# Patient Record
Sex: MALE | Race: BLACK OR AFRICAN AMERICAN | Employment: FULL TIME | URBAN - METROPOLITAN AREA
[De-identification: names, ages, dates, MRNs, and addresses within clinical notes are randomized per-mention and may not be internally consistent; named-entity substitution may affect disease eponyms.]

---

## 2017-09-12 ENCOUNTER — OFFICE VISIT (OUTPATIENT)
Dept: ENDOCRINOLOGY | Age: 57
End: 2017-09-12

## 2017-09-12 VITALS
HEART RATE: 86 BPM | SYSTOLIC BLOOD PRESSURE: 152 MMHG | HEIGHT: 69 IN | DIASTOLIC BLOOD PRESSURE: 100 MMHG | BODY MASS INDEX: 33.53 KG/M2 | WEIGHT: 226.4 LBS

## 2017-09-12 DIAGNOSIS — E78.5 HYPERLIPIDEMIA WITH TARGET LDL LESS THAN 100: ICD-10-CM

## 2017-09-12 DIAGNOSIS — I10 ESSENTIAL HYPERTENSION, BENIGN: ICD-10-CM

## 2017-09-12 RX ORDER — TADALAFIL 5 MG/1
TABLET, FILM COATED ORAL
Refills: 11 | COMMUNITY
Start: 2017-07-19 | End: 2018-02-15

## 2017-09-12 RX ORDER — ATORVASTATIN CALCIUM 20 MG/1
TABLET, FILM COATED ORAL
Refills: 6 | COMMUNITY
Start: 2017-08-23 | End: 2019-08-03 | Stop reason: SDUPTHER

## 2017-09-12 RX ORDER — AMLODIPINE BESYLATE 10 MG/1
TABLET ORAL
Qty: 30 TAB | Refills: 11 | Status: SHIPPED | OUTPATIENT
Start: 2017-09-12

## 2017-09-12 RX ORDER — AZELASTINE HYDROCHLORIDE AND FLUTICASONE PROPIONATE 137; 50 UG/1; UG/1
SPRAY, METERED NASAL
Refills: 3 | COMMUNITY
Start: 2017-06-21 | End: 2018-02-15

## 2017-09-12 RX ORDER — DULAGLUTIDE 1.5 MG/.5ML
INJECTION, SOLUTION SUBCUTANEOUS
Refills: 1 | COMMUNITY
Start: 2017-09-05 | End: 2018-02-15 | Stop reason: DRUGHIGH

## 2017-09-12 RX ORDER — ALFUZOSIN HYDROCHLORIDE 10 MG/1
TABLET, EXTENDED RELEASE ORAL
Refills: 10 | COMMUNITY
Start: 2017-08-20 | End: 2017-09-12

## 2017-09-12 RX ORDER — POTASSIUM CHLORIDE 20 MEQ/1
40 TABLET, EXTENDED RELEASE ORAL DAILY
COMMUNITY
Start: 2017-09-12 | End: 2018-08-03

## 2017-09-12 RX ORDER — PEN NEEDLE, DIABETIC 31 GX5/16"
NEEDLE, DISPOSABLE MISCELLANEOUS
Refills: 11 | COMMUNITY
Start: 2017-08-25 | End: 2019-02-12 | Stop reason: SDUPTHER

## 2017-09-12 RX ORDER — PANTOPRAZOLE SODIUM 40 MG/1
TABLET, DELAYED RELEASE ORAL
Refills: 0 | COMMUNITY
Start: 2017-08-23 | End: 2018-08-03

## 2017-09-12 NOTE — PATIENT INSTRUCTIONS
1) Your Hemoglobin A1c is a 3 month marker of your diabetes control. Goal is less than 7% which means your average blood sugar is less than 150. Your Hemoglobin A1c is 6.5% which means your diabetes is under very good control. Continue to work on your diet and exercise and take all your medications as directed. 2) However, I'm concerned you are on too much insulin and this is leading to weight gain and making you hungry all the time and causing some low blood sugars. Cut back to 22 units of toujeo starting tomorrow morning. If your morning blood sugar before breakfast is staying between  after 4 days, then try decreasing by 2 units every 4 days to get to the lowest dose that keeps your morning sugars between . If you are able to get your toujeo dose all the way down to 6 units and you are still having sugars under 130 in the morning after 4 days then stop the insulin. 3) Your blood pressure has been up at your PCP's office and again today. Increase the amlodipine to 10 mg once daily for blood pressure. Take the prescription to the pharmacy when you need it. Take 2 of the 5 mg tabs until these run out and then take 1 of the 10 mg tabs. 4) Have Dr. Jm Fuentes repeat your labs this winter and bring me a copy to your next visit in February.

## 2017-09-12 NOTE — PROGRESS NOTES
Chief Complaint   Patient presents with    Diabetes     pcp and pharmacy confirmed    Other     patient has records with him     History of Present Illness: Josee Sullivan is a 62 y.o. male who is a new patient for evaluation of diabetes. Was diagnosed with diabetes around 2009. Lost over 100 lbs and had his A1c in the 5% range. Saw my partner, Dr. Basilio Old, once in 11/14 and his A1c was 7% at that time after gaining some of his weight back and since then has been managed PCP. Once his A1c started rising, initially was given metformin and recalls having some GI side effects but doesn't know if he tried ER or not. Did try glyxambi at one point and this was stopped due to not getting his A1c to goal.  Then was started on toujeo in 2/17 and is currently taking 25 units daily. Most recently in 6/17 was started on trulicity 1.5 mg weekly. Checks blood sugars 3-5 times per day and readings run . Can be under 80 several times a week. Hasn't cut back on the toujeo with the low sugars. Most recent Hgb A1c was 6.5% in 8/17. Does stay hungry all the time and eats to keep up with the insulin. A typical day is as follows:  - breakfast: jelly biscuit or croissant  - lunch: subway 6\" inch sub with a cookie or Omar Ebbs steak cheese, fries, or chick-tyrone-a nuggets or sandwich and fries  - dinner: baked chicken, green beans, small serving of mac n cheese, meat loaf, some potato, no pasta, some rice, may have 1-2 slices of bread 4-5 times a week, may have dessert a few times a week and get an oatmeal cake   - beverages: water, 1-2 regular sodas 5 days a week  - snacks: applesauce  Exercise consists of playing golf on occasion but no other dedicated exercise. No history of vascular disease. No history of retinopathy, neuropathy, (+) nephropathy followed by Dr. Iris Hyatt. Last eye exam was a few months ago. BP was elevated at PCP's office in 8/17 and no change was made to his regimen.     Past Medical History: Diagnosis Date    Arthritis     Diabetes ([facility] [place] [address] 2009    Gout     Hyperlipidemia LDL goal < 100 3/5/2013    Hypertension     MARIA DEL CARMEN on CPAP     Renal insufficiency 4/7/2011     Past Surgical History:   Procedure Laterality Date    HX HEENT      ingrown removed from back of scalp    HX SHOULDER ARTHROSCOPY Bilateral      Current Outpatient Prescriptions   Medication Sig    TRULICITY 1.5 DJ/3.0 mL sub-q pen INJECT 0.5ML (1.5MG) UNDER THE SKIN EVERY WEEK IN THE ABDOMEN, THIGH OR UPPER ARM. ROTATE SITES    potassium chloride (K-DUR, KLOR-CON) 20 mEq tablet Take 2 Tabs by mouth daily.  atorvastatin (LIPITOR) 20 mg tablet TAKE 1 TABLET BY ORAL ROUTE EVERY DAY    DYMISTA 137-50 mcg/spray spry USE 1 SQUIRT INTO EACH NOSTRIL EVERY 12 HOURS    pantoprazole (PROTONIX) 40 mg tablet TAKE 1 TABLET BY MOUTH EVERY DAY FOR RELFUX    BD INSULIN PEN NEEDLE UF SHORT 31 gauge x 5/16\" ndle USE FOR INJECTING INSULIN    CIALIS 5 mg tablet TAKE 1 TABLET BY MOUTH EVERY DAY    TOUJEO SOLOSTAR 300 unit/mL (1.5 mL) inpn INJECT 25 UNITS DAILY FOR 5DAYS, INCREASE BY 5UNITS/5DAYS UNTIL FASTING BG<120 MAX 40 UNITS    spironolactone (ALDACTONE) 25 mg tablet TAKE 1 TABLET BY MOUTH ONCE A DAY    lisinopril (PRINIVIL, ZESTRIL) 40 mg tablet TAKE ONE TABLET BY MOUTH ONE TIME DAILY    Cholecalciferol, Vitamin D3, 1,000 unit cap Take 1,000 Units by mouth daily.  allopurinol (ZYLOPRIM) 300 mg tablet TAKE ONE TABLET BY MOUTH ONE TIME DAILY     amlodipine (NORVASC) 5 mg tablet Take one tablet by mouth one time daily    hydrochlorothiazide (MICROZIDE) 12.5 mg capsule TAKE ONE CAPSULE BY MOUTH ONE TIME DAILY     carvedilol (COREG) 6.25 mg tablet Take 1 Tab by mouth two (2) times daily (with meals). No current facility-administered medications for this visit.       Allergies   Allergen Reactions    Ciprofloxacin Itching and Swelling    Flagyl [Metronidazole] Itching and Swelling     Family History   Problem Relation Age of Onset    Diabetes Father     Cancer Father      prostate    Hypertension Father     No Known Problems Mother     Diabetes Sister     No Known Problems Brother      Social History     Social History    Marital status:      Spouse name: N/A    Number of children: N/A    Years of education: N/A     Occupational History    Not on file. Social History Main Topics    Smoking status: Never Smoker    Smokeless tobacco: Never Used    Alcohol use No    Drug use: No    Sexual activity: Yes     Partners: Female     Birth control/ protection: Surgical     Other Topics Concern    Not on file     Social History Narrative    Lives in [place] [facility] with wife. Has a 24 yo son and 21 yo daughter. Works as a  at [employer]. Likes to play golf. Review of Systems:  - Constitutional Symptoms: no fevers, chills, (+) 12 lb weight gain over the past year  - Eyes: occ blurry vision, no double vision  - Cardiovascular: no chest pain or palpitations  - Respiratory: occ cough and food can feel like it gets hung, no shortness of breath  - Gastrointestinal: occ dysphagia, occ abdominal pain  - Musculoskeletal: (+) joint pains   - Integumentary: no rashes  - Neurological: rare numbness, tingling, no headaches  - Psychiatric: no depression or anxiety  - Endocrine: no heat or cold intolerance, no polyuria or polydipsia    Physical Examination:  Blood pressure (!) 152/100, pulse 86, height 5' 9\" (1.753 m), weight 226 lb 6.4 oz (102.7 kg).   Repeat manually 156/102 in left arm.  - General: pleasant, no distress, good eye contact  - HEENT: no exopthalmos, no periorbital edema, no scleral/conjunctival injection, EOMI, no lid lag or stare  - Neck: supple, no thyromegaly, masses, lymph nodes, or carotid bruits, no supraclavicular or dorsocervical fat pads  - Cardiovascular: regular, normal rate, normal S1 and S2, no murmurs/rubs/gallops,  - Respiratory: clear to auscultation bilaterally  - Gastrointestinal: soft, nontender, nondistended, no masses, no hepatosplenomegaly  - Musculoskeletal: no proximal muscle weakness in upper or lower extremities  - Integumentary: (+) acanthosis nigricans, no abdominal striae, no rashes, no edema, no foot ulcers  - Neurological: see foot exam  - Psychiatric: normal mood and affect         Diabetic foot exam performed by Melba Watson MD     Measurement  Response Nurse Comment Physician Comment   Monofilament  R - reduced sensation with micro filament  L - reduced sensation with micro filament     Pulse DP R - 2+ (normal)  L - 2+ (normal)     Pulse TP R -  slightly decreased    L -  slightly decreased       Structural deformity R - None  L - None     Skin Integrity / Deformity R - Mild - callus  L - Mild - callus        Reviewed by:                 Data Reviewed: 8/24/17  - Hgb A1c 6.5%  - lipids: total 182 ,  HDL 30, ,   - ALT 54, AST 39  - BUN/Cr 18/1.59  - microalbumin 55.1  -   - TSH 0.966  - vitamin D 86.1    Assessment/Plan:   1. Uncontrolled type 2 diabetes mellitus with diabetic nephropathy, with long-term current use of insulin ([facility] [address]): his most recent Hgb A1c was 6.5% in 8/17 which is excellent. However, I'm concerned about hypoglycemia and weight gain from the insulin so will decrease his toujeo as below. - decrease toujeo to 22 units daily  - cont trulicity 1.5 mg weekly  - check bs 3-5 times daily due to fluctuating sugars and hypoglycemia  - foot exam done 9/17  - [facility] summer 2017--will obtain report  - microalbumin 55.1 8/17  - check Hgb A1c, cmp, and microalbumin prior to next visit at PCP's office      2. Essential hypertension, benign: his BP was above goal < 140/90  - increase amlodipine to 10 mg daily  - cont coreg 6.25 mg bid  - cont lisinopril 40 mg daily  - cont spironolactone 25 mg daily      3.  Hyperlipidemia with target LDL less than 100:  in 8/17 and was just started on atorva 20 mg daily  - cont atorva 20 mg daily  - check lipids prior to next visit at PCP's office        Patient Instructions   1) Your Hemoglobin A1c is a 3 month marker of your diabetes control. Goal is less than 7% which means your average blood sugar is less than 150. Your Hemoglobin A1c is 6.5% which means your diabetes is under very good control. Continue to work on your diet and exercise and take all your medications as directed. 2) However, I'm concerned you are on too much insulin and this is leading to weight gain and making you hungry all the time and causing some low blood sugars. Cut back to 22 units of toujeo starting tomorrow morning. If your morning blood sugar before breakfast is staying between  after 4 days, then try decreasing by 2 units every 4 days to get to the lowest dose that keeps your morning sugars between . If you are able to get your toujeo dose all the way down to 6 units and you are still having sugars under 130 in the morning after 4 days then stop the insulin. 3) Your blood pressure has been up at your PCP's office and again today. Increase the amlodipine to 10 mg once daily for blood pressure. Take the prescription to the pharmacy when you need it. Take 2 of the 5 mg tabs until these run out and then take 1 of the 10 mg tabs. 4) Have Dr. Apoorva Leonardo repeat your labs this winter and bring me a copy to your next visit in February. Follow-up Disposition:  Return in about 5 months (around 2/12/2018).     Copy sent to:  Dr. Jackeline Cross

## 2017-09-12 NOTE — MR AVS SNAPSHOT
Visit Information Date & Time Provider Department Dept. Phone Encounter #  
 9/12/2017 10:50 AM Smooth Rodgers MD [facility] [phone] [id number] Follow-up Instructions Return in about 5 months (around 2/12/2018). Upcoming Health Maintenance Date Due Hepatitis C Screening 1960 Pneumococcal 19-64 Medium Risk (1 of 1 - PPSV23) 1/15/1979 DTaP/Tdap/Td series (1 - Tdap) 1/15/1981 FOBT Q 1 YEAR AGE 50-75 1/15/2010 INFLUENZA AGE 9 TO ADULT 8/1/2017 Allergies as of 9/12/2017  Review Complete On: 9/12/2017 By: Smooth Rodgers MD  
  
 Severity Noted Reaction Type Reactions Ciprofloxacin  05/24/2013    Itching, Swelling Flagyl [Metronidazole]  05/24/2013    Itching, Swelling Current Immunizations  Never Reviewed Name Date Influenza Vaccine 10/15/2014 Not reviewed this visit Vitals BP Pulse Height(growth percentile) Weight(growth percentile) BMI Smoking Status (!) 152/100 86 5' 9\" (1.753 m) 226 lb 6.4 oz (102.7 kg) 33.43 kg/m2 Never Smoker Vitals History BMI and BSA Data Body Mass Index Body Surface Area  
 33.43 kg/m 2 2.24 m 2 Preferred Pharmacy Pharmacy Name Phone [address] [facility] [phone] Your Updated Medication List  
  
   
This list is accurate as of: 9/12/17 12:25 PM.  Always use your most recent med list.  
  
  
  
  
 allopurinol 300 mg tablet Commonly known as:  ZYLOPRIM  
TAKE ONE TABLET BY MOUTH ONE TIME DAILY  
  
 amLODIPine 10 mg tablet Commonly known as:  [place] Take one tablet by mouth one time daily  
  
 atorvastatin 20 mg tablet Commonly known as:  LIPITOR  
TAKE 1 TABLET BY ORAL ROUTE EVERY DAY  
  
 BD INSULIN PEN NEEDLE UF SHORT 31 gauge x 5/16\" Ndle Generic drug:  Insulin Needles (Disposable) USE FOR INJECTING INSULIN  
  
 carvedilol 6.25 mg tablet Commonly known as:  Raynette Hy Take 1 Tab by mouth two (2) times daily (with meals). cholecalciferol 1,000 unit Cap Commonly known as:  VITAMIN D3 Take 1,000 Units by mouth daily. CIALIS 5 mg tablet Generic drug:  tadalafil TAKE 1 TABLET BY MOUTH EVERY DAY  
  
 DYMISTA 137-50 mcg/spray [place] Generic drug:  azelastine-fluticasone USE 1 SQUIRT INTO EACH NOSTRIL EVERY 12 HOURS  
  
 hydroCHLOROthiazide 12.5 mg capsule Commonly known as:  [place] TAKE ONE CAPSULE BY MOUTH ONE TIME DAILY  
  
 lisinopril 40 mg tablet Commonly known as:  PRINIVIL, ZESTRIL  
TAKE ONE TABLET BY MOUTH ONE TIME DAILY pantoprazole 40 mg tablet Commonly known as:  PROTONIX  
TAKE 1 TABLET BY MOUTH EVERY DAY FOR RELFUX potassium chloride 20 mEq tablet Commonly known as:  K-DUR, KLOR-CON Take 2 Tabs by mouth daily. spironolactone 25 mg tablet Commonly known as:  ALDACTONE  
TAKE 1 TABLET BY MOUTH ONCE A DAY  
  
 TOUJEO SOLOSTAR 300 unit/mL (1.5 mL) Inpn Generic drug:  insulin glargine INJECT 22 UNITS DAILY  
  
 TRULICITY 1.5 RV/5.6 mL sub-q pen Generic drug:  dulaglutide INJECT 0.5ML (1.5MG) UNDER THE SKIN EVERY WEEK IN THE ABDOMEN, THIGH OR UPPER ARM. ROTATE SITES Prescriptions Printed Refills  
 amLODIPine (NORVASC) 10 mg tablet 11 Sig: Take one tablet by mouth one time daily Class: Print Follow-up Instructions Return in about 5 months (around 2/12/2018). Patient Instructions 1) Your Hemoglobin A1c is a 3 month marker of your diabetes control. Goal is less than 7% which means your average blood sugar is less than 150. Your Hemoglobin A1c is 6.5% which means your diabetes is under very good control. Continue to work on your diet and exercise and take all your medications as directed.  
 
2) However, I'm concerned you are on too much insulin and this is leading to weight gain and making you hungry all the time and causing some low blood sugars. Cut back to 22 units of toujeo starting tomorrow morning. If your morning blood sugar before breakfast is staying between  after 4 days, then try decreasing by 2 units every 4 days to get to the lowest dose that keeps your morning sugars between . If you are able to get your toujeo dose all the way down to 6 units and you are still having sugars under 130 in the morning after 4 days then stop the insulin. 3) Your blood pressure has been up at your PCP's office and again today. Increase the amlodipine to 10 mg once daily for blood pressure. Take the prescription to the pharmacy when you need it. Take 2 of the 5 mg tabs until these run out and then take 1 of the 10 mg tabs. 4) Have Dr. Martha Ferreira repeat your labs this winter and bring me a copy to your next visit in February. Introducing [facility] & [facility] SERVICES! Dear Tab Arroyo: 
Thank you for requesting a [facility] account. Our records indicate that you already have an active [facility]ina account. You can access your account anytime at [email or web address] Did you know that you can access your hospital and ER discharge instructions at any time in Lalina? You can also review all of your test results from your hospital stay or ER visit. Additional Information If you have questions, please visit the Frequently Asked Questions section of the [facility]ina website at [email or web address] Remember, [facility]ina is NOT to be used for urgent needs. For medical emergencies, dial 911. Now available from your iPhone and Android! Please provide this summary of care documentation to your next provider. Your primary care clinician is listed as [address]. If you have any questions after today's visit, please call [phone].

## 2017-12-03 ENCOUNTER — HOSPITAL ENCOUNTER (EMERGENCY)
Age: 57
Discharge: HOME | End: 2017-12-03
Attending: EMERGENCY MEDICINE
Payer: COMMERCIAL

## 2017-12-03 VITALS
OXYGEN SATURATION: 99 % | HEART RATE: 77 BPM | RESPIRATION RATE: 17 BRPM | SYSTOLIC BLOOD PRESSURE: 160 MMHG | DIASTOLIC BLOOD PRESSURE: 78 MMHG | TEMPERATURE: 97.5 F

## 2017-12-03 DIAGNOSIS — B02.9 HERPES ZOSTER WITHOUT COMPLICATION: Primary | ICD-10-CM

## 2017-12-03 PROCEDURE — 99283 EMERGENCY DEPT VISIT LOW MDM: CPT

## 2017-12-03 PROCEDURE — 74011250637 HC RX REV CODE- 250/637: Performed by: EMERGENCY MEDICINE

## 2017-12-03 RX ORDER — VALACYCLOVIR HYDROCHLORIDE 500 MG/1
500 TABLET, FILM COATED ORAL
Status: COMPLETED | OUTPATIENT
Start: 2017-12-03 | End: 2017-12-03

## 2017-12-03 RX ORDER — OXYCODONE HYDROCHLORIDE 10 MG/1
10 TABLET ORAL
Qty: 30 TAB | Refills: 0 | Status: SHIPPED | OUTPATIENT
Start: 2017-12-03 | End: 2018-02-15

## 2017-12-03 RX ORDER — VALACYCLOVIR HYDROCHLORIDE 1 G/1
1000 TABLET, FILM COATED ORAL 3 TIMES DAILY
Qty: 21 TAB | Refills: 0 | Status: SHIPPED | OUTPATIENT
Start: 2017-12-03 | End: 2017-12-03

## 2017-12-03 RX ORDER — VALACYCLOVIR HYDROCHLORIDE 1 G/1
1000 TABLET, FILM COATED ORAL 3 TIMES DAILY
Qty: 21 TAB | Refills: 0 | Status: SHIPPED | OUTPATIENT
Start: 2017-12-03 | End: 2017-12-10

## 2017-12-03 RX ORDER — LIDOCAINE 50 MG/G
1 PATCH TOPICAL EVERY 24 HOURS
Status: DISCONTINUED | OUTPATIENT
Start: 2017-12-03 | End: 2017-12-03 | Stop reason: HOSPADM

## 2017-12-03 RX ADMIN — VALACYCLOVIR HYDROCHLORIDE 500 MG: 500 TABLET, FILM COATED ORAL at 05:50

## 2017-12-03 NOTE — DISCHARGE INSTRUCTIONS
Shingles: Care Instructions  Your Care Instructions    Shingles (herpes zoster) causes pain and a blistered rash. The rash can appear anywhere on the body but will be on only one side of the body, the left or right. It will be in a band, a strip, or a small area. The pain can be very severe. Shingles can also cause tingling or itching in the area of the rash. The blisters scab over after a few days and heal in 2 to 4 weeks. Medicines can help you feel better and may help prevent more serious problems caused by shingles. Shingles is caused by the same virus that causes chickenpox. When you have chickenpox, the virus gets into your nerve roots and stays there (becomes dormant) long after you get over the chickenpox. If the virus becomes active again, it can cause shingles. Follow-up care is a key part of your treatment and safety. Be sure to make and go to all appointments, and call your doctor if you are having problems. It's also a good idea to know your test results and keep a list of the medicines you take. How can you care for yourself at home? · Be safe with medicines. Take your medicines exactly as prescribed. Call your doctor if you think you are having a problem with your medicine. Antiviral medicine helps you get better faster. · Try not to scratch or pick at the blisters. They will crust over and fall off on their own if you leave them alone. · Put cool, wet cloths on the area to relieve pain and itching. You can also use calamine lotion. Try not to use so much lotion that it cakes and is hard to get off. · Put cornstarch or baking soda on the sores to help dry them out so they heal faster. · Do not use thick ointment, such as petroleum jelly, on the sores. This will keep them from drying and healing. · To help remove loose crusts, soak them in tap water. This can help decrease oozing, and dry and soothe the skin.   · Take an over-the-counter pain medicine, such as acetaminophen (Tylenol), ibuprofen (Advil, Motrin), or naproxen (Aleve). Read and follow all instructions on the label. · Avoid close contact with people until the blisters have healed. It is very important for you to avoid contact with anyone who has never had chickenpox or the chickenpox vaccine. Pregnant women, young babies, and anyone else who has a hard time fighting infection (such as someone with HIV, diabetes, or cancer) is especially at risk. When should you call for help? Call your doctor now or seek immediate medical care if:  ? · You have a new or higher fever. ? · You have a severe headache and a stiff neck. ? · You lose the ability to think clearly. ? · The rash spreads to your forehead, nose, eyes, or eyelids. ? · You have eye pain, or your vision gets worse. ? · You have new pain in your face, or you cannot move the muscles in your face. ? · Blisters spread to new parts of your body. ? Watch closely for changes in your health, and be sure to contact your doctor if:  ? · The rash has not healed after 2 to 4 weeks. ? · You still have pain after the rash has healed. Where can you learn more? Go to http://bob-stefan.info/. Raymond Chester in the search box to learn more about \"Shingles: Care Instructions. \"  Current as of: March 3, 2017  Content Version: 11.4  © 1197-1923 [employer]. Care instructions adapted under license by [employer] (which disclaims liability or warranty for this information). If you have questions about a medical condition or this instruction, always ask your healthcare professional. [place] [address] any warranty or liability for your use of this information.       SALPONAS- IS A LIDOCAINE PATCH AVAILABLE OVER THE COUNTER

## 2017-12-03 NOTE — ED PROVIDER NOTES
EMERGENCY DEPARTMENT HISTORY AND PHYSICAL EXAM      Date: 12/3/2017  Patient Name: Jian Barajas III    History of Presenting Illness     Chief Complaint   Patient presents with    Allergic Reaction     patient presents with itching to left portion of neck after starting Levaquin last Monday       History Provided By: Patient    HPI: Dejan Costello, 62 y.o. male with PMHx significant for HTN, DM, HLD, renal insufficiency, gout, presents ambulatory to the ED with cc of itching and painful bumps to the posterior left neck x 5 days. Pt was seen by urology 6 days ago and started on Levaquin. Pt denies any difficulties with his hearing. Pt lives with his wife. He is scheduled to return to work in 2 days. PCP: Adis Penaloza MD    There are no other complaints, changes, or physical findings at this time. Current Facility-Administered Medications   Medication Dose Route Frequency Provider Last Rate Last Dose    lidocaine (LIDODERM) 5 % patch 1 Patch  1 Patch TransDERmal Q24H Giovanny Muniz, DO   1 Patch at 12/03/17 0550     Current Outpatient Prescriptions   Medication Sig Dispense Refill    oxyCODONE IR (ROXICODONE) 10 mg tab immediate release tablet Take 1 Tab by mouth every four (4) hours as needed. Max Daily Amount: 60 mg. 30 Tab 0    valACYclovir (VALTREX) 1 gram tablet Take 1 Tab by mouth three (3) times daily for 7 days. 21 Tab 0    TRULICITY 1.5 OM/4.2 mL sub-q pen INJECT 0.5ML (1.5MG) UNDER THE SKIN EVERY WEEK IN THE ABDOMEN, THIGH OR UPPER ARM. ROTATE SITES  1    potassium chloride (K-DUR, KLOR-CON) 20 mEq tablet Take 2 Tabs by mouth daily.       atorvastatin (LIPITOR) 20 mg tablet TAKE 1 TABLET BY ORAL ROUTE EVERY DAY  6    DYMISTA 137-50 mcg/spray spry USE 1 SQUIRT INTO EACH NOSTRIL EVERY 12 HOURS  3    pantoprazole (PROTONIX) 40 mg tablet TAKE 1 TABLET BY MOUTH EVERY DAY FOR RELFUX  0    BD INSULIN PEN NEEDLE UF SHORT 31 gauge x 5/16\" ndle USE FOR INJECTING INSULIN  11    CIALIS 5 mg tablet TAKE 1 TABLET BY MOUTH EVERY DAY  11    amLODIPine (NORVASC) 10 mg tablet Take one tablet by mouth one time daily 30 Tab 11    TOUJEO SOLOSTAR 300 unit/mL (1.5 mL) inpn INJECT 22 UNITS DAILY  2    spironolactone (ALDACTONE) 25 mg tablet TAKE 1 TABLET BY MOUTH ONCE A DAY  1    lisinopril (PRINIVIL, ZESTRIL) 40 mg tablet TAKE ONE TABLET BY MOUTH ONE TIME DAILY 30 Tab 0    Cholecalciferol, Vitamin D3, 1,000 unit cap Take 1,000 Units by mouth daily.  allopurinol (ZYLOPRIM) 300 mg tablet TAKE ONE TABLET BY MOUTH ONE TIME DAILY  30 tablet 2    hydrochlorothiazide (MICROZIDE) 12.5 mg capsule TAKE ONE CAPSULE BY MOUTH ONE TIME DAILY  30 capsule 1    carvedilol (COREG) 6.25 mg tablet Take 1 Tab by mouth two (2) times daily (with meals). 180 Tab 3       Past History     Past Medical History:  Past Medical History:   Diagnosis Date    Arthritis     Diabetes ([facility] [address] 2009    Gout     Hyperlipidemia LDL goal < 100 3/5/2013    Hypertension     MARIA DEL CARMEN on CPAP     Renal insufficiency 4/7/2011       Past Surgical History:  Past Surgical History:   Procedure Laterality Date    HX HEENT      ingrown removed from back of scalp    HX SHOULDER ARTHROSCOPY Bilateral        Family History:  Family History   Problem Relation Age of Onset    Diabetes Father     Cancer Father      prostate    Hypertension Father     No Known Problems Mother     Diabetes Sister     No Known Problems Brother        Social History:  Social History   Substance Use Topics    Smoking status: Never Smoker    Smokeless tobacco: Never Used    Alcohol use No       Allergies: Allergies   Allergen Reactions    Ciprofloxacin Itching and Swelling    Flagyl [Metronidazole] Itching and Swelling         Review of Systems   Review of Systems   Constitutional: Negative. Negative for appetite change, chills, fatigue and fever. HENT: Negative. Negative for congestion, rhinorrhea, sinus pressure and sore throat. Eyes: Negative.     Respiratory: Negative. Negative for cough, choking, chest tightness, shortness of breath and wheezing. Cardiovascular: Negative. Negative for chest pain, palpitations and leg swelling. Gastrointestinal: Negative for abdominal pain, constipation, diarrhea, nausea and vomiting. Endocrine: Negative. Genitourinary: Negative. Negative for difficulty urinating, dysuria, flank pain and urgency. Musculoskeletal: Negative. Skin: Negative.         + vesicles, itching, swelling   Neurological: Negative. Negative for dizziness, speech difficulty, weakness, light-headedness, numbness and headaches. Psychiatric/Behavioral: Negative. All other systems reviewed and are negative. Physical Exam   Physical Exam   Constitutional: He is oriented to person, place, and time. He appears well-developed and well-nourished. No distress. HENT:   Head: Normocephalic and atraumatic. Mouth/Throat: Oropharynx is clear and moist. No oropharyngeal exudate. Eyes: Conjunctivae and EOM are normal. Pupils are equal, round, and reactive to light. Neck: Normal range of motion. Neck supple. No JVD present. No tracheal deviation present. Cardiovascular: Normal rate, regular rhythm, normal heart sounds and intact distal pulses. No murmur heard. Pulmonary/Chest: Effort normal and breath sounds normal. No stridor. No respiratory distress. He has no wheezes. He has no rales. He exhibits no tenderness. Genitourinary:   Genitourinary Comments: Foy cath in place     Musculoskeletal: Normal range of motion. He exhibits no edema or tenderness. Neurological: He is alert and oriented to person, place, and time. No cranial nerve deficit. No gross motor or sensory deficits    Skin: Skin is warm and dry. Rash (Vesicle rash behin left ear coming down the left side of the neck, no drainage ) noted. He is not diaphoretic. Psychiatric: He has a normal mood and affect.  His behavior is normal.   Nursing note and vitals reviewed. Diagnostic Study Results     Labs -   No results found for this or any previous visit (from the past 12 hour(s)). Medical Decision Making   I am the first provider for this patient. I reviewed the vital signs, available nursing notes, past medical history, past surgical history, family history and social history. Vital Signs-Reviewed the patient's vital signs. Patient Vitals for the past 12 hrs:   Temp Pulse Resp BP SpO2   12/03/17 0514 97.5 °F (36.4 °C) 88 16 (!) 193/109 98 %       Pulse Oximetry Analysis - 98% on RA    Cardiac Monitor:   Rate: 88 bpm  Rhythm: Normal Sinus Rhythm        Records Reviewed: Nursing Notes    Provider Notes (Medical Decision Making):   DDx: shingles    ED Course:   Initial assessment performed. The patients presenting problems have been discussed, and they are in agreement with the care plan formulated and outlined with them. I have encouraged them to ask questions as they arise throughout their visit. 6:54 AM  Pt improved on re-eval. Ready to go home. Disposition:  DISCHARGE NOTE:  6:42 AM  Pt has been reexamined. Pt has no new complaints, changes, or physical findings. Care plan outlined and precautions discussed. All available results reviewed with pt. All medications reviewed with pt. All of pts questions and concerns addressed. Pt agrees to f/u as instructed and agrees to return to ED upon further deterioration. Pt is ready to go home. Written by Shayy Mckee ED Scribe as dictated by Jose Rodriguez DO    PLAN:  1. Current Discharge Medication List      START taking these medications    Details   oxyCODONE IR (ROXICODONE) 10 mg tab immediate release tablet Take 1 Tab by mouth every four (4) hours as needed. Max Daily Amount: 60 mg.  Qty: 30 Tab, Refills: 0      valACYclovir (VALTREX) 1 gram tablet Take 1 Tab by mouth three (3) times daily for 7 days. Qty: 21 Tab, Refills: 0           2.    Follow-up Information     Follow up With Details Comments Contact Info    Ariadne Johnson, [address] [facility]  [phone]          Return to ED if worse     Diagnosis     Clinical Impression:   1. Herpes zoster without complication        Attestations:    Attestation: This note is prepared by Magdy Reddy, acting as Scribe for DO Elma Melendrez DO: The scribe's documentation has been prepared under my direction and personally reviewed by me in its entirety. I confirm that the note above accurately reflects all work, treatment, procedures, and medical decision making performed by me.

## 2018-01-18 ENCOUNTER — HOSPITAL ENCOUNTER (OUTPATIENT)
Dept: NUCLEAR MEDICINE | Age: 58
Discharge: HOME | End: 2018-01-18
Attending: SPECIALIST
Payer: COMMERCIAL

## 2018-01-18 DIAGNOSIS — R10.13 EPIGASTRIC ABDOMINAL PAIN: ICD-10-CM

## 2018-01-18 DIAGNOSIS — R68.81 EARLY SATIETY: ICD-10-CM

## 2018-01-18 DIAGNOSIS — K42.9 UMBILICAL HERNIA: ICD-10-CM

## 2018-01-18 DIAGNOSIS — R10.819 ABDOMINAL TENDERNESS: ICD-10-CM

## 2018-01-18 PROCEDURE — 78264 GASTRIC EMPTYING IMG STUDY: CPT

## 2018-01-29 ENCOUNTER — TELEPHONE (OUTPATIENT)
Dept: ENDOCRINOLOGY | Age: 58
End: 2018-01-29

## 2018-01-29 NOTE — TELEPHONE ENCOUNTER
Patient is requesting a call back. He stated that he thinks he maybe having a reaction to his trulicity. He stated that he is having stomach issues and a lot of bloating. He can be reached at [phone].

## 2018-01-30 NOTE — TELEPHONE ENCOUNTER
It's possible the trulicity could be the cause and I agree with staying off this the next 2 weeks until he comes to see me on 2/15/18. Then we can decided if he should go back on this at a lower dose or not. He should monitor his sugars closely off the trulicity over the next 2 weeks and bring a list of readings to his visit.

## 2018-01-30 NOTE — TELEPHONE ENCOUNTER
I spoke with patient and he stated that he has been experiencing GI upset for quite some time but it has been severe for the past few months. He stated he has seen a GI doctor and he has even had the Upper GI study. He would like to know if the Trulicity could be causing the stomach upset. He stated that he started the Trulicity in June of 2752. He stated that the sever stomach upset started in October of 2017. He is trying to do the process of elimination to see if the trulicity could add to his stomach condition. Patient stated that he did not take his weekly injection on yesterday and his stomach does feel a little better.

## 2018-02-15 ENCOUNTER — TELEPHONE (OUTPATIENT)
Dept: ENDOCRINOLOGY | Age: 58
End: 2018-02-15

## 2018-02-15 ENCOUNTER — OFFICE VISIT (OUTPATIENT)
Dept: ENDOCRINOLOGY | Age: 58
End: 2018-02-15

## 2018-02-15 VITALS
HEIGHT: 69 IN | DIASTOLIC BLOOD PRESSURE: 80 MMHG | WEIGHT: 217.2 LBS | HEART RATE: 73 BPM | SYSTOLIC BLOOD PRESSURE: 140 MMHG | BODY MASS INDEX: 32.17 KG/M2

## 2018-02-15 DIAGNOSIS — I10 ESSENTIAL HYPERTENSION, BENIGN: ICD-10-CM

## 2018-02-15 DIAGNOSIS — E78.5 HYPERLIPIDEMIA WITH TARGET LDL LESS THAN 100: ICD-10-CM

## 2018-02-15 RX ORDER — SPIRONOLACTONE 50 MG/1
TABLET, FILM COATED ORAL
Qty: 30 TAB | Refills: 11 | Status: SHIPPED | OUTPATIENT
Start: 2018-02-15 | End: 2018-09-28 | Stop reason: SDUPTHER

## 2018-02-15 NOTE — PROGRESS NOTES
Chief Complaint   Patient presents with    Diabetes     pcp and pharmacy confirmed    Other     eye exam is due     History of Present Illness: Mejia Connelly is a 62 y.o. male here for follow up of diabetes. Weight down 9 lbs since last visit in 9/17. Had been taking trulicity every week until he called us on 1/29/18 and was having more nausea and abd pain and bloating. Has not taken any trulicity the past 3 weeks but still is having some GI symptoms despite not being on this medication and he thinks some of the symptoms could be the food he is eating and due eating dinner late at night. Currently has decreased the toujeo to 18 units and has been on this dose for several months. While on the trulicity was having fasting sugars in the 90-120s. Off the trulicity, it has been closer to 180-200 in the morning but has seen a reading over 300 after eating. Over the past 3 weeks, has really tried to work on his diet to cut back on portions and his types of carbs. Has been on the higher dose of amlodipine since last visit. No myalgias on the atorvastatin. Current Outpatient Prescriptions   Medication Sig    potassium chloride (K-DUR, KLOR-CON) 20 mEq tablet Take 2 Tabs by mouth daily.  atorvastatin (LIPITOR) 20 mg tablet TAKE 1 TABLET BY ORAL ROUTE EVERY DAY    pantoprazole (PROTONIX) 40 mg tablet TAKE 1 TABLET BY MOUTH EVERY DAY FOR RELFUX    BD INSULIN PEN NEEDLE UF SHORT 31 gauge x 5/16\" ndle USE FOR INJECTING INSULIN    amLODIPine (NORVASC) 10 mg tablet Take one tablet by mouth one time daily    TOUJEO SOLOSTAR 300 unit/mL (1.5 mL) inpn 18 units daily    spironolactone (ALDACTONE) 25 mg tablet TAKE 1 TABLET BY MOUTH ONCE A DAY    lisinopril (PRINIVIL, ZESTRIL) 40 mg tablet TAKE ONE TABLET BY MOUTH ONE TIME DAILY    Cholecalciferol, Vitamin D3, 1,000 unit cap Take 1,000 Units by mouth daily.     allopurinol (ZYLOPRIM) 300 mg tablet TAKE ONE TABLET BY MOUTH ONE TIME DAILY     hydrochlorothiazide (MICROZIDE) 12.5 mg capsule TAKE ONE CAPSULE BY MOUTH ONE TIME DAILY     carvedilol (COREG) 6.25 mg tablet Take 1 Tab by mouth two (2) times daily (with meals).  TRULICITY 1.5 WK/9.0 mL sub-q pen INJECT 0.5ML (1.5MG) UNDER THE SKIN EVERY WEEK IN THE ABDOMEN, THIGH OR UPPER ARM. ROTATE SITES     No current facility-administered medications for this visit. Allergies   Allergen Reactions    Ciprofloxacin Itching and Swelling    Flagyl [Metronidazole] Itching and Swelling     Review of Systems:  - Eyes: no blurry vision or double vision  - Cardiovascular: no chest pain  - Respiratory: no shortness of breath  - Musculoskeletal: no myalgias  - Neurological: no numbness/tingling in extremities    Physical Examination:  Blood pressure 140/80, pulse 73, height 5' 9\" (1.753 m), weight 217 lb 3.2 oz (98.5 kg). - General: pleasant, no distress, good eye contact   - Neck: no carotid bruits  - Cardiovascular: regular, normal rate, nl s1 and s2, no m/r/g,   - Respiratory: clear bilaterally  - Integumentary: no edema,   - Psychiatric: normal mood and affect    Data Reviewed:   - none new for review    Assessment/Plan:     1. Uncontrolled type 2 diabetes mellitus with diabetic nephropathy, with long-term current use of insulin ([facility] [address]): his most recent Hgb A1c was 6.5% in 8/17. I expect this one to be higher since stopping the trulicity 3 weeks ago due to GI side effects. I will try restarting at a lower dose to see if he can tolerate this better. - cont toujeo 18 units daily  - restart trulicity at 5.13 mg weekly  - check bs 3-5 times daily due to fluctuating sugars and hypoglycemia  - foot exam done 9/17  - optho UTD 11/16--due now  - microalbumin 55.1 8/17  - check A1c and cmp today  - check Hgb A1c, cmp, and microalbumin prior to next visit       2. Essential hypertension, benign: his BP was above goal < 140/90 so will stop hctz and increase the spironolactone.   - cont amlodipine 10 mg daily  - cont coreg 6.25 mg bid  - cont lisinopril 40 mg daily  - increase spironolactone to 50 mg daily  - stop hctz 12.5 mg daily      3. Hyperlipidemia with target LDL less than 100:  in 8/17 and was just started on atorva 20 mg daily  - cont atorva 20 mg daily  - check lipids today and prior to next visit         Patient Instructions   1) Please call [phone] to reset your [employer] password and I'll e-mail your lab results. 2) We will restart the trulicity at the lower dose 0.75 mg weekly to help with any GI side effects. 3) Keep taking toujeo 18 units daily in the morning. If your morning sugars are staying under 110, then cut back by 2 units every week to get to the lowest dose that keeps you between  in the morning. 4) We will increase the spironolactone to 50 mg once daily. Take 2 of the 25 mg tabs until these run out and then take 1 of the 50 mg tabs. Stop the hctz 12.5 mg tabs. 5) Start monitoring blood pressure about 2-3 times per week at alternating times either in the morning or evening after resting for 5 minutes and sitting upright in a chair with your arm at heart level. Please let me know if you are having readings over 140 on the top number or 90 on the bottom number after 2 weeks on the new dose of spironolactone. 6) If you want to change to mail order in the future for any of your meds, let me know. 7) Based on the lab results, I'll determine what I need for next time and I'll put an order directly into the [facility] system and you can go sometime the week before the visit. I'll send you a reminder e-mail to do this. Follow-up Disposition:  Return in about 5 months (around 7/15/2018).     Copy sent to:  Dr. Nicolle Pollard    Lab follow up: 2/17/18    Sent him the following message in a letter:    Resulted Orders   HEMOGLOBIN A1C WITH EAG   Result Value Ref Range    Hemoglobin A1c 10.4 (H) 4.8 - 5.6 %      Comment:               Pre-diabetes: 5.7 - 6.4           Diabetes: >6.4           Glycemic control for adults with diabetes: <7.0      Estimated average glucose 252 mg/dL    Narrative    Performed at:  [address]  [id number]  : Saul Gross MD, Phone:  [phone]   METABOLIC PANEL, COMPREHENSIVE   Result Value Ref Range    Glucose 339 (H) 65 - 99 mg/dL    BUN 21 6 - 24 mg/dL    Creatinine 1.71 (H) 0.76 - 1.27 mg/dL    GFR est non-AA 43 (L) >59 mL/min/1.73    GFR est AA 50 (L) >59 mL/min/1.73    BUN/Creatinine ratio 12 9 - 20    Sodium 139 134 - 144 mmol/L    Potassium 4.5 3.5 - 5.2 mmol/L    Chloride 94 (L) 96 - 106 mmol/L    CO2 25 18 - 29 mmol/L    Calcium 9.8 8.7 - 10.2 mg/dL    Protein, total 7.7 6.0 - 8.5 g/dL    Albumin 4.4 3.5 - 5.5 g/dL    GLOBULIN, TOTAL 3.3 1.5 - 4.5 g/dL    A-G Ratio 1.3 1.2 - 2.2    Bilirubin, total 0.6 0.0 - 1.2 mg/dL    Alk. phosphatase 106 39 - 117 IU/L    AST (SGOT) 61 (H) 0 - 40 IU/L    ALT (SGPT) 85 (H) 0 - 44 IU/L    Narrative    Performed at:  [address]  [id number]  : Saul Gross MD, Phone:  [phone]   LIPID PANEL   Result Value Ref Range    Cholesterol, total 105 100 - 199 mg/dL    Triglyceride 194 (H) 0 - 149 mg/dL    HDL Cholesterol 28 (L) >39 mg/dL    VLDL, calculated 39 5 - 40 mg/dL    LDL, calculated 38 0 - 99 mg/dL    Narrative    Performed at:  [address]  [id number]  : Saul Gross MD, Phone:  [phone]     I wanted to update you on your recent lab results:    Hemoglobin A1c is a 3 month marker of your diabetes control. Goal is less than 7% which means your average blood sugar is less than 150. Your Hemoglobin A1c is 10.4% which means your diabetes is under worse control than 6.5% at your last check. Hopefully you'll be able to tolerate the lower dose of trulicity and get your blood sugars back down.   Continue to work on your diet and exercise and take all your medications as directed.  -------------------------------------------------------------------------------------------------------------------  Total Cholesterol is the total number of cholesterol particles in your blood. Goal is less than 200. Your value is at goal.    Triglycerides are the short term fats in your blood. Goal is less than 150. Your value is above goal.    HDL is the good cholesterol in your blood. Goal is more than 40. Your value is below goal but I'm not concerned given how low your total cholesterol is. LDL is the bad cholesterol in your blood. Goal is less than 100. Your value is at goal with starting atorvastatin. Continue to follow a low cholesterol diet. Try to limit the amount of fried foods, fatty foods, butter, gravy, red meat, ice cream, cheese, and eggs in your diet, which are all high in cholesterol. Take all of your medications (atorvastatin) as directed.  -------------------------------------------------------------------------------------------------------------------  BUN and creatinine are markers of kidney function. Your creatinine is abnormal and up from 1.59 to 1.71 likely due to worsening diabetes control.  -------------------------------------------------------------------------------------------------------------------  ALT and AST are markers of liver function. Your values are abnormal and up from 54 and 39 at last check. It's unclear if this rise could be from starting the atorvastatin for cholesterol so we'll follow this for now and if your level is going any higher, we will plan on decreasing your dose of atorvastatin.

## 2018-02-15 NOTE — MR AVS SNAPSHOT
[address] [facility] [address] [address] [phone] Patient: Haresh Jones 
MRN:  [facility]:[id number] Visit Information Date & Time Provider Department Dept. Phone Encounter #  
 2/15/2018  9:30 AM Aditi Wahl, [address] [place] [address] Diabetes and Endocrinology [phone] Follow-up Instructions Return in about 5 months (around 7/15/2018). Upcoming Health Maintenance Date Due Hepatitis C Screening 1960 Pneumococcal 19-64 Medium Risk (1 of 1 - PPSV23) 1/15/1979 DTaP/Tdap/Td series (1 - Tdap) 1/15/1981 FOBT Q 1 YEAR AGE 50-75 1/15/2010 Influenza Age 5 to Adult 8/1/2017 EYE EXAM RETINAL OR DILATED Q1 11/4/2017 HEMOGLOBIN A1C Q6M 2/23/2018 MICROALBUMIN Q1 8/23/2018 LIPID PANEL Q1 8/23/2018 FOOT EXAM Q1 9/12/2018 Allergies as of 2/15/2018  Review Complete On: 2/15/2018 By: Aditi Wahl MD  
  
 Severity Noted Reaction Type Reactions Ciprofloxacin  05/24/2013    Itching, Swelling Flagyl [Metronidazole]  05/24/2013    Itching, Swelling Current Immunizations  Never Reviewed Name Date Influenza Vaccine 10/15/2014 Not reviewed this visit Vitals BP Pulse Height(growth percentile) Weight(growth percentile) BMI Smoking Status 140/80 73 5' 9\" (1.753 m) 217 lb 3.2 oz (98.5 kg) 32.07 kg/m2 Never Smoker Vitals History BMI and BSA Data Body Mass Index Body Surface Area 32.07 kg/m 2 2.19 m 2 Preferred Pharmacy Pharmacy Name Phone [facility] [address] [place] [facility] Darío Dwyer [phone] Your Updated Medication List  
  
   
This list is accurate as of: 2/15/18 10:54 AM.  Always use your most recent med list.  
  
  
  
  
 allopurinol 300 mg tablet Commonly known as:  ZYLOPRIM  
TAKE ONE TABLET BY MOUTH ONE TIME DAILY  
  
 amLODIPine 10 mg tablet Commonly known as:  Lon Gruber Take one tablet by mouth one time daily  
  
 atorvastatin 20 mg tablet Commonly known as:  LIPITOR  
TAKE 1 TABLET BY ORAL ROUTE EVERY DAY  
  
 BD INSULIN PEN NEEDLE UF SHORT 31 gauge x \" Ndle Generic drug:  Insulin Needles (Disposable) USE FOR INJECTING INSULIN  
  
 carvedilol 6.25 mg tablet Commonly known as:  Kailee Thanh Take 1 Tab by mouth two (2) times daily (with meals). cholecalciferol 1,000 unit Cap Commonly known as:  VITAMIN D3 Take 1,000 Units by mouth daily. dulaglutide 0.75 mg/0.5 mL sub-q pen Commonly known as:  TRULICITY  
0.5 mL by SubCUTAneous route every seven (7) days. NEW LOWER DOSE REPLACES THE 1.5 MG DOSE  
  
 hydroCHLOROthiazide 12.5 mg capsule Commonly known as:  Catherne Any TAKE ONE CAPSULE BY MOUTH ONE TIME DAILY  
  
 lisinopril 40 mg tablet Commonly known as:  PRINIVIL, ZESTRIL  
TAKE ONE TABLET BY MOUTH ONE TIME DAILY pantoprazole 40 mg tablet Commonly known as:  PROTONIX  
TAKE 1 TABLET BY MOUTH EVERY DAY FOR RELFUX potassium chloride 20 mEq tablet Commonly known as:  K-DUR, KLOR-CON Take 2 Tabs by mouth daily. spironolactone 50 mg tablet Commonly known as:  ALDACTONE  
TAKE 1 TABLET BY MOUTH ONCE A DAY  
  
 TOUJEO SOLOSTAR 300 unit/mL (1.5 mL) Inpn Generic drug:  insulin glargine 18 units daily Prescriptions Sent to Pharmacy Refills  
 dulaglutide (TRULICITY) 0.62 RN/5.9 mL sub-q pen 11 Si.5 mL by SubCUTAneous route every seven (7) days. NEW LOWER DOSE REPLACES THE 1.5 MG DOSE Class: Normal  
 Pharmacy: [facility] [id number] [place] [facility] Ph #: [phone] Route: SubCUTAneous  
 spironolactone (ALDACTONE) 50 mg tablet 11 Sig: TAKE 1 TABLET BY MOUTH ONCE A DAY Class: Normal  
 Pharmacy: [facility] [id number] [place] [facility] Ph #: [phone] Follow-up Instructions Return in about 5 months (around 7/15/2018). Patient Instructions 1) Please call [phone] to reset your [employer] password and I'll e-mail your lab results. 2) We will restart the trulicity at the lower dose 0.75 mg weekly to help with any GI side effects. 3) Keep taking toujeo 18 units daily in the morning. If your morning sugars are staying under 110, then cut back by 2 units every week to get to the lowest dose that keeps you between  in the morning. 4) We will increase the spironolactone to 50 mg once daily. Take 2 of the 25 mg tabs until these run out and then take 1 of the 50 mg tabs. Stop the hctz 12.5 mg tabs. 5) Start monitoring blood pressure about 2-3 times per week at alternating times either in the morning or evening after resting for 5 minutes and sitting upright in a chair with your arm at heart level. Please let me know if you are having readings over 140 on the top number or 90 on the bottom number after 2 weeks on the new dose of spironolactone. 6) If you want to change to mail order in the future for any of your meds, let me know. 7) Based on the lab results, I'll determine what I need for next time and I'll put an order directly into the [employer] [employer] system and you can go sometime the week before the visit. I'll send you a reminder e-mail to do this. Introducing [facility]! Dear Roger Rosas: 
Thank you for requesting a [employer] account. Our records indicate that you have previously registered for a [employer] account but its currently inactive. Please call our [employer] support line at [phone]. Additional Information If you have questions, please visit the Frequently Asked Questions section of the [employer] website at [email or web address] Remember, [employer] is NOT to be used for urgent needs. For medical emergencies, dial 911. Now available from your iPhone and Android! Please provide this summary of care documentation to your next provider. Your primary care clinician is listed as [facility]. If you have any questions after today's visit, please call [phone].

## 2018-02-15 NOTE — PATIENT INSTRUCTIONS
1) Please call [phone] to reset your [employer] password and I'll e-mail your lab results. 2) We will restart the trulicity at the lower dose 0.75 mg weekly to help with any GI side effects. 3) Keep taking toujeo 18 units daily in the morning. If your morning sugars are staying under 110, then cut back by 2 units every week to get to the lowest dose that keeps you between  in the morning. 4) We will increase the spironolactone to 50 mg once daily. Take 2 of the 25 mg tabs until these run out and then take 1 of the 50 mg tabs. Stop the hctz 12.5 mg tabs. 5) Start monitoring blood pressure about 2-3 times per week at alternating times either in the morning or evening after resting for 5 minutes and sitting upright in a chair with your arm at heart level. Please let me know if you are having readings over 140 on the top number or 90 on the bottom number after 2 weeks on the new dose of spironolactone. 6) If you want to change to mail order in the future for any of your meds, let me know. 7) Based on the lab results, I'll determine what I need for next time and I'll put an order directly into the [employer] system and you can go sometime the week before the visit. I'll send you a reminder e-mail to do this.

## 2018-02-16 LAB
ALBUMIN SERPL-MCNC: 4.4 G/DL (ref 3.5–5.5)
ALBUMIN/GLOB SERPL: 1.3 {RATIO} (ref 1.2–2.2)
ALP SERPL-CCNC: 106 IU/L (ref 39–117)
ALT SERPL-CCNC: 85 IU/L (ref 0–44)
AST SERPL-CCNC: 61 IU/L (ref 0–40)
BILIRUB SERPL-MCNC: 0.6 MG/DL (ref 0–1.2)
BUN SERPL-MCNC: 21 MG/DL (ref 6–24)
BUN/CREAT SERPL: 12 (ref 9–20)
CALCIUM SERPL-MCNC: 9.8 MG/DL (ref 8.7–10.2)
CHLORIDE SERPL-SCNC: 94 MMOL/L (ref 96–106)
CHOLEST SERPL-MCNC: 105 MG/DL (ref 100–199)
CO2 SERPL-SCNC: 25 MMOL/L (ref 18–29)
CREAT SERPL-MCNC: 1.71 MG/DL (ref 0.76–1.27)
EST. AVERAGE GLUCOSE BLD GHB EST-MCNC: 252 MG/DL
GFR SERPLBLD CREATININE-BSD FMLA CKD-EPI: 43 ML/MIN/1.73
GFR SERPLBLD CREATININE-BSD FMLA CKD-EPI: 50 ML/MIN/1.73
GLOBULIN SER CALC-MCNC: 3.3 G/DL (ref 1.5–4.5)
GLUCOSE SERPL-MCNC: 339 MG/DL (ref 65–99)
HBA1C MFR BLD: 10.4 % (ref 4.8–5.6)
HDLC SERPL-MCNC: 28 MG/DL
INTERPRETATION, 910389: NORMAL
INTERPRETATION: NORMAL
LDLC SERPL CALC-MCNC: 38 MG/DL (ref 0–99)
Lab: NORMAL
PDF IMAGE, 910387: NORMAL
POTASSIUM SERPL-SCNC: 4.5 MMOL/L (ref 3.5–5.2)
PROT SERPL-MCNC: 7.7 G/DL (ref 6–8.5)
SODIUM SERPL-SCNC: 139 MMOL/L (ref 134–144)
TRIGL SERPL-MCNC: 194 MG/DL (ref 0–149)
VLDLC SERPL CALC-MCNC: 39 MG/DL (ref 5–40)

## 2018-02-16 NOTE — TELEPHONE ENCOUNTER
Received a request through covermymeds to start a PA for pt's trulicity. Filled out the required info and it was submitted to express scripts and received the following response:    THIS MEMBER [address] [facility], hospitals DOES NOT MANAGE PA FOR THESE CLIENTS. Therefore, will wait to see if the pharmacy contacts me again to pursue a PA for this medication.

## 2018-02-19 ENCOUNTER — TELEPHONE (OUTPATIENT)
Dept: ENDOCRINOLOGY | Age: 58
End: 2018-02-19

## 2018-02-19 NOTE — TELEPHONE ENCOUNTER
Patient is requesting a call back regarding the prior authorization for his trulicity. He can be reached at [phone].

## 2018-02-19 NOTE — TELEPHONE ENCOUNTER
I spoke with patient and he stated that he does not have workmans comp. I informed him that I will do so research on what is going on with his prior auth.

## 2018-02-21 NOTE — TELEPHONE ENCOUNTER
Please let him know we submitted an authorization for the trulicity to express scripts and we will be in touch as soon as we hear a response.

## 2018-02-22 ENCOUNTER — TELEPHONE (OUTPATIENT)
Dept: ENDOCRINOLOGY | Age: 58
End: 2018-02-22

## 2018-02-22 NOTE — TELEPHONE ENCOUNTER
Please call to clarify with patient what is going on and how is sugars are running. Also call the insurance to find out the status of the trulicity and if it's not approved yet, then we can arrange for him to  a sample.

## 2018-02-22 NOTE — TELEPHONE ENCOUNTER
You can let him know we are still waiting to hear from his insurance about the trulicity but I do have 2 sample pens he can  this afternoon if he comes before 5:45pm.  I would advise temporarily increase his toujeo up to 24 units in the morning to help bring down his sugars under 150 and if they are coming back down over the next week with restarting trulicity, then he can go back down to 18 units of toujeo.

## 2018-02-22 NOTE — TELEPHONE ENCOUNTER
Per Mr. Angella Perry all his readings are as follows:  Fasting, before lunch and before bedtime  2/19  194  220  243    2/20  210  237  257    2/21  200  220  320    This morning 330

## 2018-02-22 NOTE — TELEPHONE ENCOUNTER
I spoke with patient and he stated he was on his way back to work and he would call me back with his numbers.

## 2018-02-22 NOTE — TELEPHONE ENCOUNTER
Pt notified of message per Dr. Tahira Singh and voiced understanding of what was read to him. He stated that he will  the samples today.

## 2018-02-22 NOTE — TELEPHONE ENCOUNTER
Patients wife Akash Louis is requesting a call back. She would like to know if there are any samples of Trulicity that the patient can get and she also stated that the patient is starting to have vision problems. She can be reached at [phone]. She is on patients HIPAA.

## 2018-05-08 ENCOUNTER — TELEPHONE (OUTPATIENT)
Dept: ENDOCRINOLOGY | Age: 58
End: 2018-05-08

## 2018-05-08 RX ORDER — INSULIN GLARGINE 300 U/ML
INJECTION, SOLUTION SUBCUTANEOUS
Refills: 2 | COMMUNITY
Start: 2018-05-08 | End: 2018-06-07 | Stop reason: SDUPTHER

## 2018-05-08 NOTE — TELEPHONE ENCOUNTER
He said his sugars are currently in the 180-250 range despite taking 9.14 of trulicity and 18 units of toujeo. I advised him that I don't want to go back up to 1.5 of trulicity due to concern for more GI side effects. Instead I want him to increase his toujeo to 22 units per day and see if this will keep his fasting sugars closer to 130 or less. He needs to work on his portions of carbs and if he is able to do so and keep his sugars under 130, then he can cut back on the toujeo by 2 units as needed to keep fasting sugars under 130. he voiced understanding of this plan.

## 2018-05-08 NOTE — TELEPHONE ENCOUNTER
Patient stated that his sugars are not going down with him being on the .7 dose of the trulicity. He stated that his stomach issues are better and he would like to know what he can do to lower his sugar?

## 2018-05-08 NOTE — TELEPHONE ENCOUNTER
Patient called to say that his blood sugars have been running between 180-250 and he would like a call back regarding this. His number is:    [phone].

## 2018-06-07 RX ORDER — INSULIN GLARGINE 300 U/ML
INJECTION, SOLUTION SUBCUTANEOUS
Qty: 4.5 ML | Refills: 11 | Status: SHIPPED | OUTPATIENT
Start: 2018-06-07 | End: 2018-08-03 | Stop reason: SDUPTHER

## 2018-07-24 LAB
ALBUMIN SERPL-MCNC: 4.3 G/DL (ref 3.5–5.5)
ALBUMIN/CREAT UR: 8.1 MG/G CREAT (ref 0–30)
ALBUMIN/GLOB SERPL: 1.3 {RATIO} (ref 1.2–2.2)
ALP SERPL-CCNC: 104 IU/L (ref 39–117)
ALT SERPL-CCNC: 57 IU/L (ref 0–44)
AST SERPL-CCNC: 47 IU/L (ref 0–40)
BILIRUB SERPL-MCNC: 0.4 MG/DL (ref 0–1.2)
BUN SERPL-MCNC: 16 MG/DL (ref 6–24)
BUN/CREAT SERPL: 10 (ref 9–20)
CALCIUM SERPL-MCNC: 10 MG/DL (ref 8.7–10.2)
CHLORIDE SERPL-SCNC: 98 MMOL/L (ref 96–106)
CHOLEST SERPL-MCNC: 135 MG/DL (ref 100–199)
CO2 SERPL-SCNC: 23 MMOL/L (ref 20–29)
CREAT SERPL-MCNC: 1.53 MG/DL (ref 0.76–1.27)
CREAT UR-MCNC: 73.2 MG/DL
EST. AVERAGE GLUCOSE BLD GHB EST-MCNC: 326 MG/DL
GLOBULIN SER CALC-MCNC: 3.4 G/DL (ref 1.5–4.5)
GLUCOSE SERPL-MCNC: 322 MG/DL (ref 65–99)
HBA1C MFR BLD: 13 % (ref 4.8–5.6)
HDLC SERPL-MCNC: 26 MG/DL
INTERPRETATION, 910389: NORMAL
INTERPRETATION: NORMAL
LDLC SERPL CALC-MCNC: ABNORMAL MG/DL (ref 0–99)
Lab: NORMAL
MICROALBUMIN UR-MCNC: 5.9 UG/ML
PDF IMAGE, 910387: NORMAL
POTASSIUM SERPL-SCNC: 4.1 MMOL/L (ref 3.5–5.2)
PROT SERPL-MCNC: 7.7 G/DL (ref 6–8.5)
SODIUM SERPL-SCNC: 139 MMOL/L (ref 134–144)
TRIGL SERPL-MCNC: 472 MG/DL (ref 0–149)
VLDLC SERPL CALC-MCNC: ABNORMAL MG/DL (ref 5–40)

## 2018-08-03 ENCOUNTER — OFFICE VISIT (OUTPATIENT)
Dept: ENDOCRINOLOGY | Age: 58
End: 2018-08-03

## 2018-08-03 VITALS
DIASTOLIC BLOOD PRESSURE: 73 MMHG | BODY MASS INDEX: 30.45 KG/M2 | HEIGHT: 69 IN | SYSTOLIC BLOOD PRESSURE: 119 MMHG | WEIGHT: 205.6 LBS | HEART RATE: 87 BPM

## 2018-08-03 DIAGNOSIS — I10 ESSENTIAL HYPERTENSION, BENIGN: ICD-10-CM

## 2018-08-03 DIAGNOSIS — E78.5 HYPERLIPIDEMIA WITH TARGET LDL LESS THAN 100: ICD-10-CM

## 2018-08-03 RX ORDER — INSULIN GLARGINE 300 U/ML
INJECTION, SOLUTION SUBCUTANEOUS
Qty: 4.5 ML | Refills: 11 | Status: SHIPPED | OUTPATIENT
Start: 2018-08-03 | End: 2018-11-06

## 2018-08-03 RX ORDER — INSULIN GLARGINE 300 U/ML
INJECTION, SOLUTION SUBCUTANEOUS
Qty: 4.5 ML | Refills: 11
Start: 2018-08-03 | End: 2018-08-03 | Stop reason: SDUPTHER

## 2018-08-03 NOTE — PROGRESS NOTES
Chief Complaint   Patient presents with    Diabetes     pcp and pharmacy confirmed    Other     eye exam is due     History of Present Illness: Patti Marquez is a 62 y.o. male here for follow up of diabetes. Weight down 12 lbs since last visit in 2/18. He admits to being off track with diet and exercise and stopped checking his sugars for several months but did continue taking his meds. He did not have much appetite and usually only eats one meal at night but often this is high in carbs. Just started getting back on track after labs were back and fasting sugars the past 2 days were 160 and 190. Compliant with rest of meds below. Not doing regular exercise but plans to start. Did have some mild depression this spring and has been under a lot of stress. Has been off hctz and on higher dose of damaso and BP at goal today. Current Outpatient Prescriptions   Medication Sig    TOUJEO SOLOSTAR U-300 INSULIN 300 unit/mL (1.5 mL) inpn Inject 22 units daily    dulaglutide (TRULICITY) 1.38 HI/0.0 mL sub-q pen 0.5 mL by SubCUTAneous route every seven (7) days. NEW LOWER DOSE REPLACES THE 1.5 MG DOSE    spironolactone (ALDACTONE) 50 mg tablet TAKE 1 TABLET BY MOUTH ONCE A DAY    atorvastatin (LIPITOR) 20 mg tablet TAKE 1 TABLET BY ORAL ROUTE EVERY DAY    BD INSULIN PEN NEEDLE UF SHORT 31 gauge x 5/16\" ndle USE FOR INJECTING INSULIN    amLODIPine (NORVASC) 10 mg tablet Take one tablet by mouth one time daily    lisinopril (PRINIVIL, ZESTRIL) 40 mg tablet TAKE ONE TABLET BY MOUTH ONE TIME DAILY    Cholecalciferol, Vitamin D3, 1,000 unit cap Take 1,000 Units by mouth daily.  allopurinol (ZYLOPRIM) 300 mg tablet TAKE ONE TABLET BY MOUTH ONE TIME DAILY     carvedilol (COREG) 6.25 mg tablet Take 1 Tab by mouth two (2) times daily (with meals). No current facility-administered medications for this visit.       Allergies   Allergen Reactions    Ciprofloxacin Itching and Swelling    Flagyl [Metronidazole] Itching and Swelling     Review of Systems:  - Eyes: no blurry vision or double vision  - Cardiovascular: no chest pain  - Respiratory: no shortness of breath  - Musculoskeletal: no myalgias  - Neurological: occ numbness/tingling in extremities    Physical Examination:  Blood pressure 119/73, pulse 87, height 5' 9\" (1.753 m), weight 205 lb 9.6 oz (93.3 kg). - General: pleasant, no distress, good eye contact   - Neck: no carotid bruits  - Cardiovascular: regular, normal rate, nl s1 and s2, no m/r/g,   - Respiratory: clear bilaterally  - Integumentary: no edema,   - Psychiatric: normal mood and affect    Data Reviewed:   Component      Latest Ref Rng & Units 7/23/2018 7/23/2018 7/23/2018 7/23/2018           8:36 AM  8:36 AM  8:36 AM  8:36 AM   Glucose      65 - 99 mg/dL  322 (H)     BUN      6 - 24 mg/dL  16     Creatinine      0.76 - 1.27 mg/dL  1.53 (H)     GFR est non-AA      >59 mL/min/1.73  49 (L)     GFR est AA      >59 mL/min/1.73  57 (L)     BUN/Creatinine ratio      9 - 20  10     Sodium      134 - 144 mmol/L  139     Potassium      3.5 - 5.2 mmol/L  4.1     Chloride      96 - 106 mmol/L  98     CO2      20 - 29 mmol/L  23     Calcium      8.7 - 10.2 mg/dL  10.0     Protein, total      6.0 - 8.5 g/dL  7.7     Albumin      3.5 - 5.5 g/dL  4.3     GLOBULIN, TOTAL      1.5 - 4.5 g/dL  3.4     A-G Ratio      1.2 - 2.2  1.3     Bilirubin, total      0.0 - 1.2 mg/dL  0.4     Alk. phosphatase      39 - 117 IU/L  104     AST      0 - 40 IU/L  47 (H)     ALT (SGPT)      0 - 44 IU/L  57 (H)     Cholesterol, total      100 - 199 mg/dL 135      Triglyceride      0 - 149 mg/dL 472 (H)      HDL Cholesterol      >39 mg/dL 26 (L)      VLDL, calculated      5 - 40 mg/dL Comment      LDL, calculated      0 - 99 mg/dL Comment      Creatinine, urine      Not Estab. mg/dL   73.2    Microalbumin, urine      Not Estab. ug/mL   5.9    Microalbumin/Creat.  Ratio      0.0 - 30.0 mg/g creat   8.1    Hemoglobin A1c, (calculated) 4.8 - 5.6 %    13.0 (H)   Estimated average glucose      mg/dL    326       Assessment/Plan:     1. Uncontrolled type 2 diabetes mellitus with diabetic nephropathy, with long-term current use of insulin ([facility] [address]): his most recent Hgb A1c was 13% in 7/18 up from 10.4% in 2/18 up from 6.5% in 8/17. His A1c was very high due to non-compliance with checking and his diet. Likely has developed glucose toxicity so will temporarily go up on toujeo to get fasting sugars down and then hopefully will be able to decrease his dose over time. - increase toujeo to 28 units daily  - cont trulicity at 9.37 mg weekly  - check bs 3-5 times daily due to fluctuating sugars and hypoglycemia  - foot exam done 9/17  - optho UTD 11/16--due now  - microalbumin 55.1 8/17, down to 8.1 in 7/18  - check A1c and cmp prior to next visit       2. Essential hypertension, benign: his BP was at goal < 140/90  - cont amlodipine 10 mg daily  - cont coreg 6.25 mg bid  - cont lisinopril 40 mg daily  - cont spironolactone 50 mg daily      3. Hyperlipidemia with target LDL less than 100:  in 8/17 and was just started on atorva 20 mg daily and down to 38 in 2/18.  and non- in 7/18 with A1c of 13%  - cont atorva 20 mg daily  - check lipids prior to next visit         Patient Instructions   1) When you get home tonight, plan on taking 6 units of toujeo and starting tomorrow, take 28 units. Do this for the next 4 days and if your fasting sugar before breakfast is under 130, then stay on this dose. If still over 130 after 4 days, then take 32 units for 4 more days and if this works, then stay there. Hopefully with getting back on track with diet and exercise, this will lower your sugars over time. 2) Try to get back into walking 10-15 minutes a day 2-3 times a week and work up to 30 minutes 5 times a week. This does not have to be done altogether but can be split up throughout the day.     3) If your fasting sugars are going under 110, then start cutting back on your dose by 2 units every 4 days to get to the lowest dose of toujeo that keeps your fasting sugars between 110-130.    4) Your triglycerides (short term fats) were quite high. This value can be quite elevated when your blood sugars are uncontrolled so hopefully with better diabetes control, this value will return to normal.    5) Your creatinine (kidney test) has come back down from 1.7 to 1.53 which is where it was in 8/17 despite your A1c being higher. 6) Your blood pressure is excellent.       Follow-up Disposition:  Return for 11/6/18 at 8:50am.    Copy sent to:  Dr. Meg Hartley

## 2018-08-03 NOTE — MR AVS SNAPSHOT
[address] [place] [address] [address] [facility] [address] [id number] [phone] Patient: Stefanie Marvin 
MRN:  [id number] Visit Information Date & Time Provider Department Dept. Phone Encounter #  
 8/3/2018  1:30 PM Gian Mixon, [address] [place] [address] Diabetes and Endocrinology [facility] [phone] Follow-up Instructions Return for 11/6/18 at 8:50am.  
  
Your Appointments 10/11/2018  9:40 AM  
Any with Kimmy Schwartz MD  
[address] [place] ([facility] Appt Note: cpap follow up, needs new supply [address] [facility] [address] [address] [place] [address]., [address] [address] Upcoming Health Maintenance Date Due Hepatitis C Screening 1960 Pneumococcal 19-64 Medium Risk (1 of 1 - PPSV23) 1/15/1979 DTaP/Tdap/Td series (1 - Tdap) 1/15/1981 FOBT Q 1 YEAR AGE 50-75 1/15/2010 EYE EXAM RETINAL OR DILATED Q1 11/4/2017 Influenza Age 5 to Adult 8/1/2018 FOOT EXAM Q1 9/12/2018 HEMOGLOBIN A1C Q6M 1/23/2019 MICROALBUMIN Q1 7/23/2019 LIPID PANEL Q1 7/23/2019 Allergies as of 8/3/2018  Review Complete On: 8/3/2018 By: Gian Mixon MD  
  
 Severity Noted Reaction Type Reactions Ciprofloxacin  05/24/2013    Itching, Swelling Flagyl [Metronidazole]  05/24/2013    Itching, Swelling Current Immunizations  Never Reviewed Name Date Influenza Vaccine 10/15/2014 Not reviewed this visit You Were Diagnosed With   
  
 Codes Comments Uncontrolled type 2 diabetes mellitus with diabetic nephropathy, with long-term current use of insulin ([facility] [address]    -  Primary ICD-10-CM: E11.21, E11.65, Z79.4 ICD-9-CM: 250.42, 583.81, V58.67 Hyperlipidemia with target LDL less than 100     ICD-10-CM: E78.5 ICD-9-CM: 272.4 Essential hypertension, benign     ICD-10-CM: I10 
ICD-9-CM: 401.1 Vitals BP Pulse Height(growth percentile) Weight(growth percentile) BMI Smoking Status 119/73 87 5' 9\" (1.753 m) 205 lb 9.6 oz (93.3 kg) 30.36 kg/m2 Never Smoker Vitals History BMI and BSA Data Body Mass Index Body Surface Area  
 30.36 kg/m 2 2.13 m 2 Preferred Pharmacy Pharmacy Name Phone [facility] [address] [place] [facility] [phone] Your Updated Medication List  
  
   
This list is accurate as of 8/3/18  2:17 PM.  Always use your most recent med list.  
  
  
  
  
 allopurinol 300 mg tablet Commonly known as:  ZYLOPRIM  
TAKE ONE TABLET BY MOUTH ONE TIME DAILY  
  
 amLODIPine 10 mg tablet Commonly known as:  Rochelle Colon Take one tablet by mouth one time daily  
  
 atorvastatin 20 mg tablet Commonly known as:  LIPITOR  
TAKE 1 TABLET BY ORAL ROUTE EVERY DAY  
  
 BD ULTRA-FINE SHORT PEN NEEDLE 31 gauge x 5/16\" Ndle Generic drug:  Insulin Needles (Disposable) USE FOR INJECTING INSULIN  
  
 carvedilol 6.25 mg tablet Commonly known as:  Cori [place] Take 1 Tab by mouth two (2) times daily (with meals). cholecalciferol 1,000 unit Cap Commonly known as:  VITAMIN D3 Take 1,000 Units by mouth daily. dulaglutide 0.75 mg/0.5 mL sub-q pen Commonly known as:  TRULICITY  
0.5 mL by SubCUTAneous route every seven (7) days. NEW LOWER DOSE REPLACES THE 1.5 MG DOSE  
  
 lisinopril 40 mg tablet Commonly known as:  PRINIVIL, ZESTRIL  
TAKE ONE TABLET BY MOUTH ONE TIME DAILY  
  
 spironolactone 50 mg tablet Commonly known as:  ALDACTONE  
TAKE 1 TABLET BY MOUTH ONCE A DAY  
  
 TOUJEO SOLOSTAR U-300 INSULIN 300 unit/mL (1.5 mL) Inpn Generic drug:  insulin glargine Inject 28 units daily--Dose change 8/3/18--updated med list--did not send prescription to the pharmacy We Performed the Following HEMOGLOBIN A1C WITH EAG [84528 CPT(R)] LIPID PANEL [29128 CPT(R)] METABOLIC PANEL, COMPREHENSIVE [81930 CPT(R)] Follow-up Instructions Return for 11/6/18 at 8:50am.  
  
  
Patient Instructions 1) When you get home tonight, plan on taking 6 units of toujeo and starting tomorrow, take 28 units. Do this for the next 4 days and if your fasting sugar before breakfast is under 130, then stay on this dose. If still over 130 after 4 days, then take 32 units for 4 more days and if this works, then stay there. Hopefully with getting back on track with diet and exercise. 2) Try to get back into walking 10-15 minutes a day 2-3 times a week and work up to 30 minutes 5 times a week. This does not have to be done altogether but can be split up throughout the day. 3) If your fasting sugars are going under 110, then start cutting back on your dose by 2 units every 4 days to get to the lowest dose of toujeo that keeps your fasting sugars between 110-130. 
 
4) Your triglycerides (short term fats) were quite high. This value can be quite elevated when your blood sugars are uncontrolled so hopefully with better diabetes control, this value will return to normal. 
 
5) Your creatinine (kidney test) has come back down from 1.7 to 1.53 which is where it was in 8/17 despite your A1c being higher. 6) Your blood pressure is excellent. Introducing [facility]! Dear Jesús Coley: 
Thank you for requesting a [employer] account. Our records indicate that you already have an active [employer] account. You can access your account anytime at [email or web address] Did you know that you can access your hospital and ER discharge instructions at any time in [employer]? You can also review all of your test results from your hospital stay or ER visit. Additional Information If you have questions, please visit the Frequently Asked Questions section of the [employer] website at [email or web address] Remember, [employer] is NOT to be used for urgent needs. For medical emergencies, dial 911. Now available from your iPhone and Android! Please provide this summary of care documentation to your next provider. Your primary care clinician is listed as [address] [facility] [address]. If you have any questions after today's visit, please call [phone].

## 2018-08-03 NOTE — PATIENT INSTRUCTIONS
1) When you get home tonight, plan on taking 6 units of toujeo and starting tomorrow, take 28 units. Do this for the next 4 days and if your fasting sugar before breakfast is under 130, then stay on this dose. If still over 130 after 4 days, then take 32 units for 4 more days and if this works, then stay there. Hopefully with getting back on track with diet and exercise, this will lower your sugars over time. 2) Try to get back into walking 10-15 minutes a day 2-3 times a week and work up to 30 minutes 5 times a week. This does not have to be done altogether but can be split up throughout the day. 3) If your fasting sugars are going under 110, then start cutting back on your dose by 2 units every 4 days to get to the lowest dose of toujeo that keeps your fasting sugars between 110-130.    4) Your triglycerides (short term fats) were quite high. This value can be quite elevated when your blood sugars are uncontrolled so hopefully with better diabetes control, this value will return to normal.    5) Your creatinine (kidney test) has come back down from 1.7 to 1.53 which is where it was in 8/17 despite your A1c being higher. 6) Your blood pressure is excellent.

## 2018-09-28 RX ORDER — SPIRONOLACTONE 50 MG/1
TABLET, FILM COATED ORAL
Qty: 30 TAB | Refills: 11 | Status: SHIPPED | OUTPATIENT
Start: 2018-09-28 | End: 2018-10-24 | Stop reason: SDUPTHER

## 2018-10-11 ENCOUNTER — OFFICE VISIT (OUTPATIENT)
Dept: SLEEP MEDICINE | Age: 58
End: 2018-10-11

## 2018-10-11 VITALS
HEART RATE: 82 BPM | SYSTOLIC BLOOD PRESSURE: 128 MMHG | HEIGHT: 69 IN | WEIGHT: 217 LBS | OXYGEN SATURATION: 98 % | DIASTOLIC BLOOD PRESSURE: 80 MMHG | BODY MASS INDEX: 32.14 KG/M2

## 2018-10-11 DIAGNOSIS — Z79.4 TYPE 2 DIABETES MELLITUS WITH DIABETIC NEPHROPATHY, WITH LONG-TERM CURRENT USE OF INSULIN (HCC): ICD-10-CM

## 2018-10-11 DIAGNOSIS — I10 ESSENTIAL HYPERTENSION: ICD-10-CM

## 2018-10-11 DIAGNOSIS — G47.33 OBSTRUCTIVE SLEEP APNEA (ADULT) (PEDIATRIC): Primary | ICD-10-CM

## 2018-10-11 DIAGNOSIS — E11.21 TYPE 2 DIABETES MELLITUS WITH DIABETIC NEPHROPATHY, WITH LONG-TERM CURRENT USE OF INSULIN (HCC): ICD-10-CM

## 2018-10-11 RX ORDER — CARVEDILOL 12.5 MG/1
TABLET ORAL
Refills: 2 | COMMUNITY
Start: 2018-10-01 | End: 2018-11-06

## 2018-10-11 NOTE — PATIENT INSTRUCTIONS
[address] [place]. [place], [address]  Tel.  [phone]  Fax. 100 [facility] 60  [place], [address]  Tel.  [phone]  Fax. [phone] [address]  Tel.  [phone]  Fax. [phone]     PROPER SLEEP HYGIENE    What to avoid  · Do not have drinks with caffeine, such as coffee or black tea, for 8 hours before bed. · Do not smoke or use other types of tobacco near bedtime. Nicotine is a stimulant and can keep you awake. · Avoid drinking alcohol late in the evening, because it can cause you to wake in the middle of the night. · Do not eat a big meal close to bedtime. If you are hungry, eat a light snack. · Do not drink a lot of water close to bedtime, because the need to urinate may wake you up during the night. · Do not read or watch TV in bed. Use the bed only for sleeping and sexual activity. What to try  · Go to bed at the same time every night, and wake up at the same time every morning. Do not take naps during the day. · Keep your bedroom quiet, dark, and cool. · Get regular exercise, but not within 3 to 4 hours of your bedtime. .  · Sleep on a comfortable pillow and mattress. · If watching the clock makes you anxious, turn it facing away from you so you cannot see the time. · If you worry when you lie down, start a worry book. Well before bedtime, write down your worries, and then set the book and your concerns aside. · Try meditation or other relaxation techniques before you go to bed. · If you cannot fall asleep, get up and go to another room until you feel sleepy. Do something relaxing. Repeat your bedtime routine before you go to bed again. · Make your house quiet and calm about an hour before bedtime. Turn down the lights, turn off the TV, log off the computer, and turn down the volume on music. This can help you relax after a busy day.     Drowsy Driving  The [address] [place] [address] Road Traffic Safety Administration cites drowsiness as a causing factor in more than 003,828 police reported crashes annually, resulting in 76,000 injuries and 1,500 deaths. Other surveys suggest 55% of people polled have driven while drowsy in the past year, 23% had fallen asleep but not crashed, 3% crashed, and 2% had and accident due to drowsy driving. Who is at risk? Young Drivers: One study of drowsy driving accidents states that 55% of the drivers were under 25 years. Of those, 75% were male. Shift Workers and Travelers: People who work overnight or travel across time zones frequently are at higher risk of experiencing Circadian Rhythm Disorders. They are trying to work and function when their body is programed to sleep. Sleep Deprived: Lack of sleep has a serious impact on your ability to pay attention or focus on a task. Consistently getting less than the average of 8 hours your body needs creates partial or cumulative sleep deprivation. Untreated Sleep Disorders: Sleep Apnea, Narcolepsy, R.L.S., and other sleep disorders (untreated) prevent a person from getting enough restful sleep. This leads to excessive daytime sleepiness and increases the risk for drowsy driving accidents by up to 7 times. Medications / Alcohol: Even over the counter medications can cause drowsiness. Medications that impair a drivers attention should have a warning label. Alcohol naturally makes you sleepy and on its own can cause accidents. Combined with excessive drowsiness its effects are amplified. Signs of Drowsy Driving:   * You don't remember driving the last few miles   * You may drift out of your marlee   * You are unable to focus and your thoughts wander   * You may yawn more often than normal   * You have difficulty keeping your eyes open / nodding off   * Missing traffic signs, speeding, or tailgating  Prevention-   Good sleep hygiene, lifestyle and behavioral choices have the most impact on drowsy driving.  There is no substitute for sleep and the average person requires 8 hours nightly. If you find yourself driving drowsy, stop and sleep. Consider the sleep hygiene tips provided during your visit as well. Medication Refill Policy: Refills for all medications require 1 week advance notice. Please have your pharmacy fax a refill request. We are unable to fax, or call in \"controled substance\" medications and you will need to pick these prescriptions up from our office. [employer] Activation    Thank you for requesting access to [employer]. Please follow the instructions below to securely access and download your online medical record. [employer]l allows you to send messages to your doctor, view your test results, renew your prescriptions, schedule appointments, and more. How Do I Sign Up? 1. In your internet browser, go to [email or web address]. 2. Click on the First Time User? Click Here link in the Sign In box. You will see the New Member Sign Up page. 3. Enter your P[employer]l Access Code exactly as it appears below. You will not need to use this code after youve completed the sign-up process. If you do not sign up before the expiration date, you must request a new code. Prowl Access Code: Activation code not generated  Current Prowl Status: Active (This is the date your Prowl access code will )    4. Enter the last four digits of your Social Security Number (xxxx) and Date of Birth (mm/dd/yyyy) as indicated and click Submit. You will be taken to the next sign-up page. 5. Create a Prowl ID. This will be your Prowl login ID and cannot be changed, so think of one that is secure and easy to remember. 6. Create a Prowl password. You can change your password at any time. 7. Enter your Password Reset Question and Answer. This can be used at a later time if you forget your password. 8. Enter your e-mail address. You will receive e-mail notification when new information is available in [address]   9. Click Sign Up. You can now view and download portions of your medical record. 10. Click the Download Summary menu link to download a portable copy of your medical information. Additional Information    If you have questions, please call [phone]. Remember, [employer] is NOT to be used for urgent needs. For medical emergencies, dial 911.

## 2018-10-11 NOTE — PROGRESS NOTES
[address] [facility] [place]. [place], [address]  Tel.  [phone]  Fax. 100 [facility] 60  [place], [address]  Tel.  [phone]  Fax. [phone] [address]  Tel.  [phone]  Fax. [phone]     S>Isaac Dumont III is a 62 y.o. male seen for a positive airway pressure follow-up. He reports maximal problems using the device. The following problems are identified:    Drowsiness yes Problems exhaling No, but feels like he rips it off at times because he cannot breathe   Snoring yes Forget to put on yes   Mask Comfortable No, broke his face out Can't fall asleep no   Dry Mouth no Mask falls off yes   Air Leaking no Frequent awakenings no     Download reviewed. He stopped using it in March    He admits that his sleep has not changed. Therapy Apnea Index averaged over PAP use: 1 /hr which reflects improved sleep breathing condition. However last usage was March 2018    Allergies   Allergen Reactions    Ciprofloxacin Itching and Swelling    Flagyl [Metronidazole] Itching and Swelling       He has a current medication list which includes the following prescription(s): carvedilol, spironolactone, toujeo solostar u-300 insulin, dulaglutide, atorvastatin, bd ultra-fine short pen needle, amlodipine, lisinopril, cholecalciferol, allopurinol, and carvedilol. .      He  has a past medical history of Arthritis; Diabetes ([address] (2009); Gout; Hyperlipidemia LDL goal < 100 (3/5/2013); Hypertension; MARIA DEL CARMEN on CPAP; and Renal insufficiency (4/7/2011). He also has no past medical history of Abuse; Anemia NEC; Arrhythmia; Asthma; Autoimmune disease ([address] CAD (coronary artery disease); Calculus of kidney; Cancer ([address] Chronic pain; Congestive heart failure, unspecified; Contact dermatitis and other eczema, due to unspecified cause; COPD; Depression; GERD (gastroesophageal reflux disease); Headache(784.0);  Liver disease; Psychotic disorder ([address] PUD (peptic ulcer disease); Seizures ([facility] Stroke [facility]); Thromboembolus ([facility] Thyroid disease; or Trauma. [place] Sleepiness Score: 4   and Modified F.O.S.Q. Score Total / 2: 15      O>    Visit Vitals    /80    Pulse 82    Ht 5' 9\" (1.753 m)    Wt 217 lb (98.4 kg)    SpO2 98%    BMI 32.05 kg/m2           General:   Alert, oriented, not in distress   Neck:   No JVD    Chest/Lungs:  symetrical lung expansion , no accessory muscle use    Extremities:  no obvious rashes , negative edema    Neuro:  No focal deficits ; No obvious tremor    Psych:  Normal affect ,  Normal countenance ;           A>    ICD-10-CM ICD-9-CM    1. Obstructive sleep apnea (adult) (pediatric) G47.33 327.23    2. Essential hypertension I10 401.9    3. Type 2 diabetes mellitus with diabetic nephropathy, with long-term current use of insulin ([facility]) E11.21 250.40     Z79.4 583.81      V58.67      AHI = 25(1-15). On CPAP :  8 cmH2O. Compliant:      no    Therapeutic Response:  Positive when using it    P>    PAP titration ordered to address barriers to adherence. He will be seen in follow-up to adjust settings on his machine. * He was asked to contact our office for any problems regarding PAP therapy. *Counseling was provided regarding the importance of regular PAP use and on proper sleep hygiene and safe driving. * Re-enforced proper and regular cleaning for the device. I have counseled the patient regarding the benefits of weight loss. 2. Hypertension - he continues on his current regimen. I have reviewed the relationship between hypertension as it relates to sleep-disordered breathing. 3. Type II diabetes - he continues on his current regimen. I have reviewed the relationship between sleep disordered breathing as it relates to diabetes.     Electronically signed by    Mami Foster MD  Diplomate in Sleep Medicine  [facility]

## 2018-10-24 RX ORDER — SPIRONOLACTONE 50 MG/1
TABLET, FILM COATED ORAL
Qty: 90 TAB | Refills: 3 | Status: SHIPPED | OUTPATIENT
Start: 2018-10-24 | End: 2019-08-03 | Stop reason: SDUPTHER

## 2018-11-06 ENCOUNTER — OFFICE VISIT (OUTPATIENT)
Dept: ENDOCRINOLOGY | Age: 58
End: 2018-11-06

## 2018-11-06 VITALS
HEIGHT: 69 IN | DIASTOLIC BLOOD PRESSURE: 75 MMHG | SYSTOLIC BLOOD PRESSURE: 115 MMHG | BODY MASS INDEX: 31.89 KG/M2 | WEIGHT: 215.3 LBS | HEART RATE: 83 BPM

## 2018-11-06 DIAGNOSIS — E78.5 HYPERLIPIDEMIA WITH TARGET LDL LESS THAN 100: ICD-10-CM

## 2018-11-06 DIAGNOSIS — I10 ESSENTIAL HYPERTENSION, BENIGN: ICD-10-CM

## 2018-11-06 LAB — HBA1C MFR BLD HPLC: 8.3 %

## 2018-11-06 RX ORDER — INSULIN GLARGINE 300 U/ML
INJECTION, SOLUTION SUBCUTANEOUS
Qty: 4.5 ML | Refills: 11
Start: 2018-11-06 | End: 2019-03-11

## 2018-11-06 NOTE — LETTER
2/13/2019 12:53 PM 
 
Mr. Christianne Be  
[address] [address] Please go to [facility] and have your labs repeated sometime in the 1-2 weeks prior to your upcoming visit with me. Ben Greco to allow at least 2 days at the minimum to ensure I get the results in time for your visit. Ang Quintanilla order is already in their system and be sure to ask to have labs drawn that are under Dr. Sofiya Garcia name. Kait Worley you have the actual lab order that I may have given you (check your glove compartment or other safe spot where you may keep your medical papers), please bring this to the lab just to be safe in case [facility]'s computer system is down. Memo Cervantes will review the results at your follow up visit. Please fast for your labs.  Don't eat anything after midnight. Be prepared to give a urine sample to test for any effect of the diabetes on your kidneys.   
 
 
 
 
 
Sincerely, 
 
 
Christian Jauregui MD

## 2018-11-06 NOTE — PROGRESS NOTES
Chief Complaint Patient presents with  Diabetes  Labs A1c done today History of Present Illness: Deepti Bautista is a 62 y.o. male here for follow up of diabetes. Weight up 10 lbs since last visit in 8/18. Did increase the toujeo up to 32 units at one point but then tried to decrease back to 30 and then 28 units but sugar was rising in the morning on this dose and went back to 30 units as his current dose for the past 10 days. Fasting sugar was 140 this morning after eating spaghetti last night but has been down in the 105-120 range if he's watching carbs more closely and not eating dinner too late at night. Just taking coreg once daily at this point. Has had some rare intermittent episodes of feeling warm and nauseated that can occur at rest and unclear if these are related to low sugar spells or panic so will check his sugar in the future if this happens. Current Outpatient Medications Medication Sig  TOUJEO SOLOSTAR U-300 INSULIN 300 unit/mL (1.5 mL) inpn Inject 30 units daily--Dose change 11/6/18--updated med list--did not send prescription to the pharmacy  spironolactone (ALDACTONE) 50 mg tablet TAKE 1 TABLET BY MOUTH ONCE A DAY  dulaglutide (TRULICITY) 5.97 YN/0.1 mL sub-q pen 0.5 mL by SubCUTAneous route every seven (7) days. NEW LOWER DOSE REPLACES THE 1.5 MG DOSE  atorvastatin (LIPITOR) 20 mg tablet TAKE 1 TABLET BY ORAL ROUTE EVERY DAY  BD INSULIN PEN NEEDLE UF SHORT 31 gauge x 5/16\" ndle USE FOR INJECTING INSULIN  
 amLODIPine (NORVASC) 10 mg tablet Take one tablet by mouth one time daily  lisinopril (PRINIVIL, ZESTRIL) 40 mg tablet TAKE ONE TABLET BY MOUTH ONE TIME DAILY  Cholecalciferol, Vitamin D3, 1,000 unit cap Take 1,000 Units by mouth daily.  allopurinol (ZYLOPRIM) 300 mg tablet TAKE ONE TABLET BY MOUTH ONE TIME DAILY  carvedilol (COREG) 6.25 mg tablet Take 1 Tab by mouth two (2) times daily (with meals). (Patient taking differently: Take 6.25 mg by mouth daily.) No current facility-administered medications for this visit. Allergies Allergen Reactions  Ciprofloxacin Itching and Swelling  Flagyl [Metronidazole] Itching and Swelling Review of Systems: - Eyes: no blurry vision or double vision - Cardiovascular: no chest pain - Respiratory: no shortness of breath - Musculoskeletal: no myalgias - Neurological: no numbness/tingling in extremities Physical Examination: 
Blood pressure 115/75, pulse 83, height 5' 9\" (1.753 m), weight 215 lb 4.8 oz (97.7 kg). - General: pleasant, no distress, good eye contact  
- Neck: no carotid bruits - Cardiovascular: regular, normal rate, nl s1 and s2, no m/r/g,  
- Respiratory: clear bilaterally - Integumentary: no edema,  
- Psychiatric: normal mood and affect Diabetic foot exam:  
 
Left Foot: 
 Visual Exam: callous - mild Pulse DP: 2+ (normal) Filament test: normal sensation Vibratory sensation: normal 
   
Right Foot: 
 Visual Exam: callous - mild Pulse DP: 2+ (normal) Filament test: normal sensation Vibratory sensation: normal 
 
 
 
Data Reviewed:  
- A1c = 8.3% Assessment/Plan: 1. Uncontrolled type 2 diabetes mellitus with diabetic nephropathy, with long-term current use of insulin ([address]): his most recent Hgb A1c was 8.3% in 11/18 down 13% in 7/18 up from 10.4% in 2/18 up from 6.5% in 8/17. His A1c is much better and if he continues to fine tune his diet, hopefully he'll get to goal by next time 
- cont toujeo 30 units daily 
- cont trulicity at 7.35 mg weekly - check bs 3-5 times daily due to fluctuating sugars and hypoglycemia 
- foot exam done 11/18 
- optho UTD 11/16--due now 
- microalbumin 55.1 8/17, down to 8.1 in 7/18 
- check A1c and cmp and microalbumin prior to next visit 2. Essential hypertension, benign: his BP was at goal < 140/90 
- cont amlodipine 10 mg daily - cont coreg 6.25 mg daily 
- cont lisinopril 40 mg daily 
- cont spironolactone 50 mg daily 3. Hyperlipidemia with target LDL less than 100:  in 8/17 and was just started on atorva 20 mg daily and down to 38 in 2/18.  and non- in 7/18 with A1c of 13% 
- cont atorva 20 mg daily - check lipids prior to next visit Patient Instructions 1) Your Hemoglobin A1c is a 3 month marker of your diabetes control. Goal is less than 7% which means your average blood sugar is less than 150. Your Hemoglobin A1c is 8.3% which means your diabetes is under much better control than 13% at your last check. Continue to work on your diet and exercise and take all your medications as directed. 2) Stay on the 30 units of toujeo for now but if you are able to watch your dinner carbs more closely and keep your sugar under 130 in the morning for a week straight, then cut back by 2 units every week as long as your sugar is staying under 130 in the morning to get to the lowest dose that keeps you under 130. 
 
3) We'll repeat your rest of the labs prior to next visit. Follow-up Disposition: 
Return in about 4 months (around 3/6/2019). Copy sent to: 
Dr. Anirudh Robertson

## 2018-11-06 NOTE — PATIENT INSTRUCTIONS
1) Your Hemoglobin A1c is a 3 month marker of your diabetes control. Goal is less than 7% which means your average blood sugar is less than 150. Your Hemoglobin A1c is 8.3% which means your diabetes is under much better control than 13% at your last check. Continue to work on your diet and exercise and take all your medications as directed. 2) Stay on the 30 units of toujeo for now but if you are able to watch your dinner carbs more closely and keep your sugar under 130 in the morning for a week straight, then cut back by 2 units every week as long as your sugar is staying under 130 in the morning to get to the lowest dose that keeps you under 130. 
 
3) We'll repeat your rest of the labs prior to next visit.

## 2018-11-12 ENCOUNTER — TELEPHONE (OUTPATIENT)
Dept: SLEEP MEDICINE | Age: 58
End: 2018-11-12

## 2018-11-12 DIAGNOSIS — G47.33 OBSTRUCTIVE SLEEP APNEA (ADULT) (PEDIATRIC): Primary | ICD-10-CM

## 2019-01-07 ENCOUNTER — HOSPITAL ENCOUNTER (OUTPATIENT)
Dept: SLEEP MEDICINE | Age: 59
Discharge: HOME | End: 2019-01-07
Payer: COMMERCIAL

## 2019-01-07 VITALS
HEIGHT: 69 IN | DIASTOLIC BLOOD PRESSURE: 87 MMHG | SYSTOLIC BLOOD PRESSURE: 139 MMHG | HEART RATE: 85 BPM | OXYGEN SATURATION: 95 % | WEIGHT: 219 LBS | BODY MASS INDEX: 32.44 KG/M2

## 2019-01-07 DIAGNOSIS — G47.33 OBSTRUCTIVE SLEEP APNEA (ADULT) (PEDIATRIC): ICD-10-CM

## 2019-01-07 PROCEDURE — 95811 POLYSOM 6/>YRS CPAP 4/> PARM: CPT | Performed by: INTERNAL MEDICINE

## 2019-01-12 RX ORDER — DULAGLUTIDE 0.75 MG/.5ML
INJECTION, SOLUTION SUBCUTANEOUS
Qty: 2 SYRINGE | Refills: 11 | Status: SHIPPED | OUTPATIENT
Start: 2019-01-12 | End: 2019-12-19

## 2019-01-16 ENCOUNTER — TELEPHONE (OUTPATIENT)
Dept: SLEEP MEDICINE | Age: 59
End: 2019-01-16

## 2019-01-16 NOTE — TELEPHONE ENCOUNTER
Patient was called and the delay in results was explained. Mr Lopezmaricruz Phanles understands that Dr. Gloria Julien received the study on 1/16/2019 and we will follow up with him 1/17/2019 with results.

## 2019-01-17 ENCOUNTER — DOCUMENTATION ONLY (OUTPATIENT)
Dept: SLEEP MEDICINE | Age: 59
End: 2019-01-17

## 2019-01-17 NOTE — TELEPHONE ENCOUNTER
Reviewed sleep study results with patient. He expressed understanding and is willing bring in his CPAP device for a settings change.

## 2019-01-17 NOTE — TELEPHONE ENCOUNTER
Results of sleep study in [facility]  Lead tech to convey results to patient  Initial Apnea/Hypopnea index was 25 (2015). he elected to proceed with a titration to address barriers to adherence Most of the respiratory events were resolved on CPAP 10 cmH2O. Sleep quality looked good with significant REM sleep noted    As discussed at the office, we will schedule a tech viist to adjust settings on his current machine to CPAP 10 cm. I would like to see him in 3-6 months to see how he is doing with PAP.

## 2019-01-19 ENCOUNTER — APPOINTMENT (OUTPATIENT)
Dept: CT IMAGING | Age: 59
End: 2019-01-19
Attending: EMERGENCY MEDICINE
Payer: COMMERCIAL

## 2019-01-19 ENCOUNTER — HOSPITAL ENCOUNTER (EMERGENCY)
Age: 59
Discharge: HOME | End: 2019-01-19
Attending: EMERGENCY MEDICINE
Payer: COMMERCIAL

## 2019-01-19 VITALS
OXYGEN SATURATION: 99 % | RESPIRATION RATE: 16 BRPM | WEIGHT: 211.2 LBS | HEART RATE: 86 BPM | BODY MASS INDEX: 31.28 KG/M2 | HEIGHT: 69 IN | DIASTOLIC BLOOD PRESSURE: 80 MMHG | TEMPERATURE: 98 F | SYSTOLIC BLOOD PRESSURE: 146 MMHG

## 2019-01-19 DIAGNOSIS — R10.84 ABDOMINAL PAIN, GENERALIZED: Primary | ICD-10-CM

## 2019-01-19 DIAGNOSIS — K57.92 DIVERTICULITIS: ICD-10-CM

## 2019-01-19 LAB
ALBUMIN SERPL-MCNC: 3.4 G/DL (ref 3.5–5)
ALBUMIN/GLOB SERPL: 0.7 {RATIO} (ref 1.1–2.2)
ALP SERPL-CCNC: 97 U/L (ref 45–117)
ALT SERPL-CCNC: 50 U/L (ref 12–78)
ANION GAP SERPL CALC-SCNC: 8 MMOL/L (ref 5–15)
APPEARANCE UR: CLEAR
AST SERPL-CCNC: 30 U/L (ref 15–37)
BACTERIA URNS QL MICRO: NEGATIVE /HPF
BASOPHILS # BLD: 0 K/UL (ref 0–0.1)
BASOPHILS NFR BLD: 0 % (ref 0–1)
BILIRUB SERPL-MCNC: 1.2 MG/DL (ref 0.2–1)
BILIRUB UR QL CFM: NEGATIVE
BUN SERPL-MCNC: 17 MG/DL (ref 6–20)
BUN/CREAT SERPL: 10 (ref 12–20)
CALCIUM SERPL-MCNC: 9 MG/DL (ref 8.5–10.1)
CHLORIDE SERPL-SCNC: 105 MMOL/L (ref 97–108)
CO2 SERPL-SCNC: 26 MMOL/L (ref 21–32)
COLOR UR: ABNORMAL
CREAT SERPL-MCNC: 1.64 MG/DL (ref 0.7–1.3)
DIFFERENTIAL METHOD BLD: ABNORMAL
EOSINOPHIL # BLD: 0.1 K/UL (ref 0–0.4)
EOSINOPHIL NFR BLD: 2 % (ref 0–7)
EPITH CASTS URNS QL MICRO: ABNORMAL /LPF
ERYTHROCYTE [DISTWIDTH] IN BLOOD BY AUTOMATED COUNT: 12.3 % (ref 11.5–14.5)
GLOBULIN SER CALC-MCNC: 4.6 G/DL (ref 2–4)
GLUCOSE SERPL-MCNC: 205 MG/DL (ref 65–100)
GLUCOSE UR STRIP.AUTO-MCNC: 250 MG/DL
HCT VFR BLD AUTO: 41.6 % (ref 36.6–50.3)
HGB BLD-MCNC: 14.1 G/DL (ref 12.1–17)
HGB UR QL STRIP: NEGATIVE
HYALINE CASTS URNS QL MICRO: ABNORMAL /LPF (ref 0–5)
IMM GRANULOCYTES # BLD AUTO: 0 K/UL (ref 0–0.04)
IMM GRANULOCYTES NFR BLD AUTO: 0 % (ref 0–0.5)
KETONES UR QL STRIP.AUTO: ABNORMAL MG/DL
LEUKOCYTE ESTERASE UR QL STRIP.AUTO: ABNORMAL
LIPASE SERPL-CCNC: 280 U/L (ref 73–393)
LYMPHOCYTES # BLD: 0.5 K/UL (ref 0.8–3.5)
LYMPHOCYTES NFR BLD: 8 % (ref 12–49)
MCH RBC QN AUTO: 31.3 PG (ref 26–34)
MCHC RBC AUTO-ENTMCNC: 33.9 G/DL (ref 30–36.5)
MCV RBC AUTO: 92.4 FL (ref 80–99)
MONOCYTES # BLD: 0.7 K/UL (ref 0–1)
MONOCYTES NFR BLD: 10 % (ref 5–13)
NEUTS SEG # BLD: 5.2 K/UL (ref 1.8–8)
NEUTS SEG NFR BLD: 80 % (ref 32–75)
NITRITE UR QL STRIP.AUTO: NEGATIVE
NRBC # BLD: 0 K/UL (ref 0–0.01)
NRBC BLD-RTO: 0 PER 100 WBC
PH UR STRIP: 6 [PH] (ref 5–8)
PLATELET # BLD AUTO: 134 K/UL (ref 150–400)
PMV BLD AUTO: 12.8 FL (ref 8.9–12.9)
POTASSIUM SERPL-SCNC: 3.6 MMOL/L (ref 3.5–5.1)
PROT SERPL-MCNC: 8 G/DL (ref 6.4–8.2)
PROT UR STRIP-MCNC: 30 MG/DL
RBC # BLD AUTO: 4.5 M/UL (ref 4.1–5.7)
RBC #/AREA URNS HPF: ABNORMAL /HPF (ref 0–5)
RBC MORPH BLD: ABNORMAL
SODIUM SERPL-SCNC: 139 MMOL/L (ref 136–145)
SP GR UR REFRACTOMETRY: 1.02 (ref 1–1.03)
UROBILINOGEN UR QL STRIP.AUTO: 1 EU/DL (ref 0.2–1)
WBC # BLD AUTO: 6.5 K/UL (ref 4.1–11.1)
WBC URNS QL MICRO: ABNORMAL /HPF (ref 0–4)

## 2019-01-19 PROCEDURE — 74177 CT ABD & PELVIS W/CONTRAST: CPT

## 2019-01-19 PROCEDURE — 36415 COLL VENOUS BLD VENIPUNCTURE: CPT

## 2019-01-19 PROCEDURE — 99283 EMERGENCY DEPT VISIT LOW MDM: CPT

## 2019-01-19 PROCEDURE — 74011250636 HC RX REV CODE- 250/636: Performed by: EMERGENCY MEDICINE

## 2019-01-19 PROCEDURE — 81001 URINALYSIS AUTO W/SCOPE: CPT

## 2019-01-19 PROCEDURE — 85025 COMPLETE CBC W/AUTO DIFF WBC: CPT

## 2019-01-19 PROCEDURE — 74011636320 HC RX REV CODE- 636/320: Performed by: EMERGENCY MEDICINE

## 2019-01-19 PROCEDURE — 83690 ASSAY OF LIPASE: CPT

## 2019-01-19 PROCEDURE — 80053 COMPREHEN METABOLIC PANEL: CPT

## 2019-01-19 RX ORDER — AMOXICILLIN AND CLAVULANATE POTASSIUM 875; 125 MG/1; MG/1
1 TABLET, FILM COATED ORAL 2 TIMES DAILY
Qty: 20 TAB | Refills: 0 | Status: SHIPPED | OUTPATIENT
Start: 2019-01-19 | End: 2019-01-19

## 2019-01-19 RX ORDER — SODIUM CHLORIDE 0.9 % (FLUSH) 0.9 %
10 SYRINGE (ML) INJECTION
Status: COMPLETED | OUTPATIENT
Start: 2019-01-19 | End: 2019-01-19

## 2019-01-19 RX ORDER — DICYCLOMINE HYDROCHLORIDE 20 MG/1
20 TABLET ORAL EVERY 6 HOURS
Qty: 20 TAB | Refills: 0 | Status: SHIPPED | OUTPATIENT
Start: 2019-01-19 | End: 2019-01-24

## 2019-01-19 RX ORDER — SODIUM CHLORIDE 9 MG/ML
50 INJECTION, SOLUTION INTRAVENOUS
Status: COMPLETED | OUTPATIENT
Start: 2019-01-19 | End: 2019-01-19

## 2019-01-19 RX ORDER — AMOXICILLIN AND CLAVULANATE POTASSIUM 875; 125 MG/1; MG/1
1 TABLET, FILM COATED ORAL 2 TIMES DAILY
Qty: 20 TAB | Refills: 0 | Status: SHIPPED | OUTPATIENT
Start: 2019-01-19 | End: 2019-03-11

## 2019-01-19 RX ADMIN — SODIUM CHLORIDE 50 ML/HR: 900 INJECTION, SOLUTION INTRAVENOUS at 14:06

## 2019-01-19 RX ADMIN — Medication 10 ML: at 14:05

## 2019-01-19 RX ADMIN — IOPAMIDOL 100 ML: 755 INJECTION, SOLUTION INTRAVENOUS at 14:05

## 2019-01-19 NOTE — ED PROVIDER NOTES
EMERGENCY DEPARTMENT HISTORY AND PHYSICAL EXAM 
 
 
Date: 1/19/2019 Patient Name: Josee Sullivan History of Presenting Illness Chief Complaint Patient presents with  Abdominal Pain The patient presents to the ED with complaints of abdominal discomfort for the past three days. History Provided By: Patient HPI: Josee Sullivan, 61 y.o. male with PMHx significant for arthritis, HTN, DM, gout, MARIA DEL CARMEN, presents ambulatory to the ED with cc of mid/lower abdominal pain x 2-3 days. He reports associated symptoms of decrease in appetite, constipation x 4 days, increased belching and flatus, and the sensation of abdominal distension. Pt states he had attempted to pass a bowel movement but was unable to and had only passed flatus. His pain radiates up his abdomen and noted to be intermittent. Pt reports he had a colonoscopy in December and had some polyps removed, but it was otherwise normal. He denies any prior cholecystectomy or appendectomy. Pt denies any alcohol, Ibuprofen, or ASA use. Pt denies any nausea, vomiting, fever, blood in his stools, melena, urinary pain, frequency, hematuria. There are no other complaints, changes, or physical findings at this time. PCP: Gabe Huerta MD 
 
No current facility-administered medications on file prior to encounter. Current Outpatient Medications on File Prior to Encounter Medication Sig Dispense Refill  TRULICITY 2.12 ZH/5.7 mL sub-q pen INJECT 0.5ML (0.75MG) UNDER THE SKIN EVERY 7 DAYS 2 Syringe 11  
 TOUJEO SOLOSTAR U-300 INSULIN 300 unit/mL (1.5 mL) inpn Inject 30 units daily--Dose change 11/6/18--updated med list--did not send prescription to the pharmacy 4.5 mL 11  
 spironolactone (ALDACTONE) 50 mg tablet TAKE 1 TABLET BY MOUTH ONCE A DAY 90 Tab 3  
 atorvastatin (LIPITOR) 20 mg tablet TAKE 1 TABLET BY ORAL ROUTE EVERY DAY  6  
 BD INSULIN PEN NEEDLE UF SHORT 31 gauge x 5/16\" ndle USE FOR INJECTING INSULIN  11  
  amLODIPine (NORVASC) 10 mg tablet Take one tablet by mouth one time daily 30 Tab 11  
 lisinopril (PRINIVIL, ZESTRIL) 40 mg tablet TAKE ONE TABLET BY MOUTH ONE TIME DAILY 30 Tab 0  Cholecalciferol, Vitamin D3, 1,000 unit cap Take 1,000 Units by mouth daily.  allopurinol (ZYLOPRIM) 300 mg tablet TAKE ONE TABLET BY MOUTH ONE TIME DAILY  30 tablet 2  carvedilol (COREG) 6.25 mg tablet Take 1 Tab by mouth two (2) times daily (with meals). (Patient taking differently: Take 6.25 mg by mouth daily. ) 180 Tab 3 Past History Past Medical History: 
Past Medical History:  
Diagnosis Date  Arthritis  Diabetes ([address] 2009  Gout  Hyperlipidemia LDL goal < 100 3/5/2013  Hypertension  MARIA DEL CARMEN on CPAP  Renal insufficiency 4/7/2011 Past Surgical History: 
Past Surgical History:  
Procedure Laterality Date  HX HEENT    
 ingrown removed from back of scalp  HX SHOULDER ARTHROSCOPY Bilateral   
 
 
Family History: 
Family History Problem Relation Age of Onset  Diabetes Father  Cancer Father   
     prostate  Hypertension Father  No Known Problems Mother  Diabetes Sister  No Known Problems Brother Social History: 
Social History Tobacco Use  Smoking status: Never Smoker  Smokeless tobacco: Never Used Substance Use Topics  Alcohol use: No  
  Alcohol/week: 0.0 oz  Drug use: No  
 
 
Allergies: Allergies Allergen Reactions  Ciprofloxacin Itching and Swelling  Flagyl [Metronidazole] Itching and Swelling Review of Systems Review of Systems Constitutional: Positive for appetite change. Negative for chills, fatigue and fever. HENT: Negative for congestion and rhinorrhea. Eyes: Negative for visual disturbance. Respiratory: Negative for cough, shortness of breath and wheezing. Cardiovascular: Negative for chest pain and palpitations.   
Gastrointestinal: Positive for abdominal distention, abdominal pain and constipation. Negative for blood in stool, diarrhea, nausea and vomiting.  
     +increase belching/flatus Endocrine: Negative. Genitourinary: Negative for difficulty urinating, dysuria, frequency and hematuria. Musculoskeletal: Negative. Skin: Negative for rash. Neurological: Negative for dizziness, weakness and light-headedness. Psychiatric/Behavioral: Negative for suicidal ideas. Physical Exam  
Physical Exam  
Constitutional: He is oriented to person, place, and time. He appears well-developed and well-nourished. No distress. HENT:  
Head: Normocephalic and atraumatic. Mouth/Throat: Oropharynx is clear and moist.  
Eyes: Conjunctivae and EOM are normal.  
Neck: Neck supple. No JVD present. No tracheal deviation present. Cardiovascular: Normal rate, regular rhythm and intact distal pulses. Exam reveals no gallop and no friction rub. No murmur heard. Pulmonary/Chest: Effort normal and breath sounds normal. No stridor. No respiratory distress. He has no wheezes. Abdominal: Soft. Bowel sounds are normal. He exhibits no distension and no mass. There is tenderness. There is guarding. Diffuse lower abdominal tenderness, epigastric tenderness, good bowel sounds Musculoskeletal: Normal range of motion. He exhibits no edema or tenderness. No deformity Neurological: He is alert and oriented to person, place, and time. He has normal strength. No focal deficits Skin: Skin is warm, dry and intact. No rash noted. Psychiatric: He has a normal mood and affect. His behavior is normal. Judgment and thought content normal.  
Nursing note and vitals reviewed. Diagnostic Study Results Labs - Recent Results (from the past 12 hour(s)) CBC WITH AUTOMATED DIFF Collection Time: 01/19/19 11:39 AM  
Result Value Ref Range WBC 6.5 4.1 - 11.1 K/uL  
 RBC 4.50 4. 10 - 5.70 M/uL  
 HGB 14.1 12.1 - 17.0 g/dL HCT 41.6 36.6 - 50.3 %  MCV 92.4 80.0 - 99.0 FL  
 MCH 31.3 26.0 - 34.0 PG  
 MCHC 33.9 30.0 - 36.5 g/dL  
 RDW 12.3 11.5 - 14.5 % PLATELET 116 (L) 468 - 400 K/uL MPV 12.8 8.9 - 12.9 FL  
 NRBC 0.0 0  WBC ABSOLUTE NRBC 0.00 0.00 - 0.01 K/uL NEUTROPHILS 80 (H) 32 - 75 % LYMPHOCYTES 8 (L) 12 - 49 % MONOCYTES 10 5 - 13 % EOSINOPHILS 2 0 - 7 % BASOPHILS 0 0 - 1 % IMMATURE GRANULOCYTES 0 0.0 - 0.5 % ABS. NEUTROPHILS 5.2 1.8 - 8.0 K/UL  
 ABS. LYMPHOCYTES 0.5 (L) 0.8 - 3.5 K/UL  
 ABS. MONOCYTES 0.7 0.0 - 1.0 K/UL  
 ABS. EOSINOPHILS 0.1 0.0 - 0.4 K/UL  
 ABS. BASOPHILS 0.0 0.0 - 0.1 K/UL  
 ABS. IMM. GRANS. 0.0 0.00 - 0.04 K/UL  
 DF SMEAR SCANNED    
 RBC COMMENTS NORMOCYTIC, NORMOCHROMIC METABOLIC PANEL, COMPREHENSIVE Collection Time: 01/19/19 11:39 AM  
Result Value Ref Range Sodium 139 136 - 145 mmol/L Potassium 3.6 3.5 - 5.1 mmol/L Chloride 105 97 - 108 mmol/L  
 CO2 26 21 - 32 mmol/L Anion gap 8 5 - 15 mmol/L Glucose 205 (H) 65 - 100 mg/dL BUN 17 6 - 20 MG/DL Creatinine 1.64 (H) 0.70 - 1.30 MG/DL  
 BUN/Creatinine ratio 10 (L) 12 - 20 GFR est AA 52 (L) >60 ml/min/1.73m2 GFR est non-AA 43 (L) >60 ml/min/1.73m2 Calcium 9.0 8.5 - 10.1 MG/DL Bilirubin, total 1.2 (H) 0.2 - 1.0 MG/DL  
 ALT (SGPT) 50 12 - 78 U/L  
 AST (SGOT) 30 15 - 37 U/L Alk. phosphatase 97 45 - 117 U/L Protein, total 8.0 6.4 - 8.2 g/dL Albumin 3.4 (L) 3.5 - 5.0 g/dL Globulin 4.6 (H) 2.0 - 4.0 g/dL A-G Ratio 0.7 (L) 1.1 - 2.2 URINALYSIS W/ RFLX MICROSCOPIC Collection Time: 01/19/19 11:39 AM  
Result Value Ref Range Color DARK YELLOW Appearance CLEAR CLEAR Specific gravity 1.025 1.003 - 1.030    
 pH (UA) 6.0 5.0 - 8.0 Protein 30 (A) NEG mg/dL Glucose 250 (A) NEG mg/dL Ketone TRACE (A) NEG mg/dL Blood NEGATIVE  NEG Urobilinogen 1.0 0.2 - 1.0 EU/dL Nitrites NEGATIVE  NEG  Leukocyte Esterase SMALL (A) NEG    
 WBC 10-20 0 - 4 /hpf  
 RBC 0-5 0 - 5 /hpf  
 Epithelial cells FEW FEW /lpf Bacteria NEGATIVE  NEG /hpf Hyaline cast 2-5 0 - 5 /lpf  
LIPASE Collection Time: 01/19/19 11:39 AM  
Result Value Ref Range Lipase 280 73 - 393 U/L  
BILIRUBIN, CONFIRM Collection Time: 01/19/19 11:39 AM  
Result Value Ref Range Bilirubin UA, confirm NEGATIVE  NEG Radiologic Studies -  
CT Results  (Last 48 hours) 01/19/19 1405  CT ABD PELV W CONT Final result Impression:  IMPRESSION: Diverticulitis without abscess or perforation. Narrative:  INDICATION: Abdominal pain EXAM: CT Abdomen and Pelvis is performed with 100 mL Isovue 370 contrast IV  
without complication. CT dose reduction was achieved through use of a  
standardized protocol tailored for this examination and automatic exposure  
control for dose modulation. FINDINGS:  
There is acute diverticulitis of the distal sigmoid colon midline just above the  
bladder without abscess or perforation. There is no ascites, pneumoperitoneum or significant adenopathy. Liver shows no  
significant finding. Bile ducts are not enlarged. Pancreas shows no mass or  
inflammation. No splenomegaly; small splenic cyst. Adrenal glands are normal in  
size. Kidneys show lobulated contours, cortical scarring, and small cysts,  
without mass or hydronephrosis. Aorta is without aneurysm. The appendix is normal. The bladder is not distended. The distal ureters are not  
dilated. There is no apparent pelvic mass. Medical Decision Making I am the first provider for this patient. I reviewed the vital signs, available nursing notes, past medical history, past surgical history, family history and social history. Vital Signs-Reviewed the patient's vital signs. Patient Vitals for the past 12 hrs: 
 Temp Pulse Resp BP SpO2  
01/19/19 1119 98 °F (36.7 °C) 86 16 146/80 99 % Records Reviewed: Nursing Notes, Old Medical Records, Previous Radiology Studies and Previous Laboratory Studies Provider Notes (Medical Decision Making): DDx: gastritis, PUD, cholecystitis, biliary colic, GERD, pancreatitis, UTI, diverticulitis, gastroenteritis ED Course:  
Initial assessment performed. The patients presenting problems have been discussed, and they are in agreement with the care plan formulated and outlined with them. I have encouraged them to ask questions as they arise throughout their visit. PROGRESS NOTE: 
11:50 AM 
Pt declines pain medication. 1:54 PM 
Patient's presentation, labs/imaging and plan of care was reviewed with Bill the ButcherDO as part of sign out. They will assume care as part of the plan discussed with the patient. Bill the ButcherDO's assistance in completion of this plan is greatly appreciated but it should be noted that I will be the provider of record for this patient. Samara Uirbe DO 
 
PROGRESS NOTE: 
2:44 PM 
Will talk to the pt about discharge with Augmentin prescription. Written by Chanell France ED Scribe, as dictated by Tech Don Santana DO. Critical Care Time:  
0 Disposition: 
DISCHARGE NOTE 
2:45 PM 
The patient has been re-evaluated and is ready for discharge. Reviewed available results with patient. Counseled patient on diagnosis and care plan. Patient has expressed understanding, and all questions have been answered. Patient agrees with plan and agrees to follow up as recommended, or return to the ED if their symptoms worsen. Discharge instructions have been provided and explained to the patient, along with reasons to return to the ED. PLAN: 
1. Discharge Home Current Discharge Medication List  
  
START taking these medications Details  
dicyclomine (BENTYL) 20 mg tablet Take 1 Tab by mouth every six (6) hours for 20 doses. Qty: 20 Tab, Refills: 0  
  
amoxicillin-clavulanate (AUGMENTIN) 875-125 mg per tablet Take 1 Tab by mouth two (2) times a day. Qty: 20 Tab, Refills: 0  
  
  
 
2. Follow-up Information Follow up With Specialties Details Why Contact Info Krystle Jensen MD Family Practice Schedule an appointment as soon as possible for a visit  [address] [facility] [address] 
[phone] [facility] EMERGENCY DEPT Emergency Medicine  As needed, If symptoms worsen [address] [facility] 
[phone] Return to ED if worse Diagnosis Clinical Impression: 1. Abdominal pain, generalized 2. Diverticulitis Attestations: This note is prepared by Elisabeth Prado, acting as Scribe for Roslyn Mancilla DO. Roslyn Mancilla DO: The scribe's documentation has been prepared under my direction and personally reviewed by me in its entirety. I confirm that the note above accurately reflects all work, treatment, procedures, and medical decision making performed by me. This note will not be viewable in [address]

## 2019-01-19 NOTE — PROGRESS NOTES
Dr. Aaron Dejesus reviewed discharge instructions with the patient. The patient verbalized understanding. All questions and concerns were addressed. The patient declined a wheelchair and is discharged ambulatory in the care of family members with instructions and prescriptions in hand. Pt is alert and oriented x 4. Respirations are clear and unlabored.

## 2019-01-19 NOTE — DISCHARGE INSTRUCTIONS
Diverticulitis: Care Instructions  Your Care Instructions    Diverticulitis occurs when pouches form in the wall of the colon and become inflamed or infected. It can be very painful. Doctors aren't sure what causes diverticulitis. There is no proof that foods such as nuts, seeds, or berries cause it or make it worse. A low-fiber diet may cause the colon to work harder to push stool forward. Pouches may form because of this extra work. It may be hard to think about healthy eating while you're in pain. But as you recover, you might think about how you can use healthy eating for overall better health. Healthy eating may help you avoid future attacks. Follow-up care is a key part of your treatment and safety. Be sure to make and go to all appointments, and call your doctor if you are having problems. It's also a good idea to know your test results and keep a list of the medicines you take. How can you care for yourself at home? · Drink plenty of fluids, enough so that your urine is light yellow or clear like water. If you have kidney, heart, or liver disease and have to limit fluids, talk with your doctor before you increase the amount of fluids you drink. · Stick to liquids or a bland diet (plain rice, bananas, dry toast or crackers, applesauce) until you are feeling better. Then you can return to regular foods and gradually increase the amount of fiber in your diet. · Use a heating pad set on low on your belly to relieve mild cramps and pain. · Get extra rest until you are feeling better. · Be safe with medicines. Read and follow all instructions on the label. ? If the doctor gave you a prescription medicine for pain, take it as prescribed. ? If you are not taking a prescription pain medicine, ask your doctor if you can take an over-the-counter medicine. · If your doctor prescribed antibiotics, take them as directed. Do not stop taking them just because you feel better.  You need to take the full course of antibiotics. To prevent future attacks of diverticulitis  · Avoid constipation:  ? Include fruits, vegetables, beans, and whole grains in your diet each day. These foods are high in fiber. ? Drink plenty of fluids, enough so that your urine is light yellow or clear like water. If you have kidney, heart, or liver disease and have to limit fluids, talk with your doctor before you increase the amount of fluids you drink. ? Get some exercise every day. Build up slowly to 30 to 60 minutes a day on 5 or more days of the week. ? Take a fiber supplement, such as Citrucel or Metamucil, every day if needed. Read and follow all instructions on the label. ? Schedule time each day for a bowel movement. Having a daily routine may help. Take your time and do not strain when having a bowel movement. When should you call for help? Call your doctor now or seek immediate medical care if:    · You have a fever.     · You are vomiting.     · You have new or worse belly pain.     · You cannot pass stools or gas.    Watch closely for changes in your health, and be sure to contact your doctor if you have any problems. Where can you learn more? Go to http://bob-stefan.info/. Enter H901 in the search box to learn more about \"Diverticulitis: Care Instructions. \"  Current as of: March 27, 2018  Content Version: 11.9  © 8561-9377 [employer]. Care instructions adapted under license by [employer] (which disclaims liability or warranty for this information). If you have questions about a medical condition or this instruction, always ask your healthcare professional. [place] [address] any warranty or liability for your use of this information. Patient Education        Abdominal Pain: Care Instructions  Your Care Instructions    Abdominal pain has many possible causes. Some aren't serious and get better on their own in a few days.  Others need more testing and treatment. If your pain continues or gets worse, you need to be rechecked and may need more tests to find out what is wrong. You may need surgery to correct the problem. Don't ignore new symptoms, such as fever, nausea and vomiting, urination problems, pain that gets worse, and dizziness. These may be signs of a more serious problem. Your doctor may have recommended a follow-up visit in the next 8 to 12 hours. If you are not getting better, you may need more tests or treatment. The doctor has checked you carefully, but problems can develop later. If you notice any problems or new symptoms, get medical treatment right away. Follow-up care is a key part of your treatment and safety. Be sure to make and go to all appointments, and call your doctor if you are having problems. It's also a good idea to know your test results and keep a list of the medicines you take. How can you care for yourself at home? · Rest until you feel better. · To prevent dehydration, drink plenty of fluids, enough so that your urine is light yellow or clear like water. Choose water and other caffeine-free clear liquids until you feel better. If you have kidney, heart, or liver disease and have to limit fluids, talk with your doctor before you increase the amount of fluids you drink. · If your stomach is upset, eat mild foods, such as rice, dry toast or crackers, bananas, and applesauce. Try eating several small meals instead of two or three large ones. · Wait until 48 hours after all symptoms have gone away before you have spicy foods, alcohol, and drinks that contain caffeine. · Do not eat foods that are high in fat. · Avoid anti-inflammatory medicines such as aspirin, ibuprofen (Advil, Motrin), and naproxen (Aleve). These can cause stomach upset. Talk to your doctor if you take daily aspirin for another health problem. When should you call for help? Call 911 anytime you think you may need emergency care.  For example, call if:    · You passed out (lost consciousness).     · You pass maroon or very bloody stools.     · You vomit blood or what looks like coffee grounds.     · You have new, severe belly pain.    Call your doctor now or seek immediate medical care if:    · Your pain gets worse, especially if it becomes focused in one area of your belly.     · You have a new or higher fever.     · Your stools are black and look like tar, or they have streaks of blood.     · You have unexpected vaginal bleeding.     · You have symptoms of a urinary tract infection. These may include:  ? Pain when you urinate. ? Urinating more often than usual.  ? Blood in your urine.     · You are dizzy or lightheaded, or you feel like you may faint.    Watch closely for changes in your health, and be sure to contact your doctor if:    · You are not getting better after 1 day (24 hours). Where can you learn more? Go to http://bob-stefan.info/. Enter C059 in the search box to learn more about \"Abdominal Pain: Care Instructions. \"  Current as of: September 23, 2018  Content Version: 11.9  © 6881-5682 [employer]. Care instructions adapted under license by [employer] (which disclaims liability or warranty for this information). If you have questions about a medical condition or this instruction, always ask your healthcare professional. [address] any warranty or liability for your use of this information.

## 2019-01-21 ENCOUNTER — OFFICE VISIT (OUTPATIENT)
Dept: SLEEP MEDICINE | Age: 59
End: 2019-01-21

## 2019-01-21 DIAGNOSIS — G47.33 OBSTRUCTIVE SLEEP APNEA (ADULT) (PEDIATRIC): Primary | ICD-10-CM

## 2019-01-21 NOTE — PROGRESS NOTES
REMstar Pro (System One 60 Series)   Settings changed made in the office per  Dr. Gloria Julien from [facility] to [facility] [place]  Patient will reach out to [facility] for a new mask and supplies

## 2019-02-12 RX ORDER — PEN NEEDLE, DIABETIC 30 GX3/16"
NEEDLE, DISPOSABLE MISCELLANEOUS
Qty: 100 PEN NEEDLE | Refills: 3 | Status: SHIPPED | OUTPATIENT
Start: 2019-02-12 | End: 2019-02-19 | Stop reason: SDUPTHER

## 2019-02-19 ENCOUNTER — TELEPHONE (OUTPATIENT)
Dept: ENDOCRINOLOGY | Age: 59
End: 2019-02-19

## 2019-02-19 RX ORDER — PEN NEEDLE, DIABETIC 30 GX3/16"
NEEDLE, DISPOSABLE MISCELLANEOUS
Qty: 100 PEN NEEDLE | Refills: 3 | Status: SHIPPED | OUTPATIENT
Start: 2019-02-19 | End: 2020-03-05

## 2019-02-19 NOTE — TELEPHONE ENCOUNTER
Please give [facility] pharmacy a call @ [phone]. Needs Clarifications on pt  Rx BD insulin Pen needle.

## 2019-02-19 NOTE — TELEPHONE ENCOUNTER
Breana Gilman pharmacist needs a new script with direction on how many times he is using the pen needles. I informed her that it would be resent today.

## 2019-03-08 LAB
ALBUMIN SERPL-MCNC: 4.7 G/DL (ref 3.5–5.5)
ALBUMIN/CREAT UR: 11.1 MG/G CREAT (ref 0–30)
ALBUMIN/GLOB SERPL: 1.4 {RATIO} (ref 1.2–2.2)
ALP SERPL-CCNC: 95 IU/L (ref 39–117)
ALT SERPL-CCNC: 58 IU/L (ref 0–44)
AST SERPL-CCNC: 44 IU/L (ref 0–40)
BILIRUB SERPL-MCNC: 0.7 MG/DL (ref 0–1.2)
BUN SERPL-MCNC: 19 MG/DL (ref 6–24)
BUN/CREAT SERPL: 14 (ref 9–20)
CALCIUM SERPL-MCNC: 9.7 MG/DL (ref 8.7–10.2)
CHLORIDE SERPL-SCNC: 102 MMOL/L (ref 96–106)
CHOLEST SERPL-MCNC: 123 MG/DL (ref 100–199)
CO2 SERPL-SCNC: 25 MMOL/L (ref 20–29)
CREAT SERPL-MCNC: 1.37 MG/DL (ref 0.76–1.27)
CREAT UR-MCNC: 150 MG/DL
EST. AVERAGE GLUCOSE BLD GHB EST-MCNC: 223 MG/DL
GLOBULIN SER CALC-MCNC: 3.3 G/DL (ref 1.5–4.5)
GLUCOSE SERPL-MCNC: 122 MG/DL (ref 65–99)
HBA1C MFR BLD: 9.4 % (ref 4.8–5.6)
HDLC SERPL-MCNC: 34 MG/DL
INTERPRETATION, 910389: NORMAL
INTERPRETATION: NORMAL
LDLC SERPL CALC-MCNC: 59 MG/DL (ref 0–99)
Lab: NORMAL
MICROALBUMIN UR-MCNC: 16.7 UG/ML
PDF IMAGE, 910387: NORMAL
POTASSIUM SERPL-SCNC: 4.2 MMOL/L (ref 3.5–5.2)
PROT SERPL-MCNC: 8 G/DL (ref 6–8.5)
SODIUM SERPL-SCNC: 141 MMOL/L (ref 134–144)
TRIGL SERPL-MCNC: 150 MG/DL (ref 0–149)
VLDLC SERPL CALC-MCNC: 30 MG/DL (ref 5–40)

## 2019-03-11 ENCOUNTER — OFFICE VISIT (OUTPATIENT)
Dept: ENDOCRINOLOGY | Age: 59
End: 2019-03-11

## 2019-03-11 VITALS
WEIGHT: 213.4 LBS | SYSTOLIC BLOOD PRESSURE: 118 MMHG | HEART RATE: 84 BPM | HEIGHT: 69 IN | BODY MASS INDEX: 31.61 KG/M2 | DIASTOLIC BLOOD PRESSURE: 80 MMHG

## 2019-03-11 DIAGNOSIS — E78.5 HYPERLIPIDEMIA WITH TARGET LDL LESS THAN 100: ICD-10-CM

## 2019-03-11 DIAGNOSIS — I10 ESSENTIAL HYPERTENSION, BENIGN: ICD-10-CM

## 2019-03-11 RX ORDER — INSULIN GLARGINE 300 U/ML
INJECTION, SOLUTION SUBCUTANEOUS
Qty: 4.5 ML | Refills: 11
Start: 2019-03-11 | End: 2019-07-30 | Stop reason: SDUPTHER

## 2019-03-11 NOTE — PATIENT INSTRUCTIONS
1) Your Hemoglobin A1c (3 month test of blood sugar) is 9.4% up from 8.3% at the last check. This is likely due to after meal spikes that you are not seeing. 2) Check blood sugars once daily either fasting (goal is ) or 2 hours after a meal (goal is less than 180). Alternate one meal a day to check (example: one day check after breakfast, one day after lunch, one day after dinner). Write down what you ate if your blood sugar is more than 180 so you can know to cut back or cut out this food from your diet. 3) Your cholesterol is much better and your urine test is normal and your creatinine (kidney test) has improved. 4) ALT and AST are markers of liver function. Your values are still slightly abnormal and likely elevated due to fatty deposition in the liver that can occur with weight gain.   Hopefully with weight loss, these values will return to normal.    5) Your blood pressure is at goal.

## 2019-03-11 NOTE — LETTER
7/25/2019 6:36 AM 
 
Mr. James Dickey  
[address] [address] Please go to [facility] and have your labs repeated sometime in the 1-2 weeks prior to your upcoming visit with me. Rashmi Good to allow at least 2 days at the minimum to ensure I get the results in time for your visit. Nik Garcia order is already in their system and be sure to ask to have labs drawn that are under Dr. Osmani Hurd name. Aylin Reef you have the actual lab order that I may have given you (check your glove compartment or other safe spot where you may keep your medical papers), please bring this to the lab just to be safe in case [employer]'s computer system is down. Stanley Sen will review the results at your follow up visit. You won't need to fast for your labs before your next visit.   
 
 
 
Sincerely, 
 
 
Berna Parsons MD

## 2019-03-11 NOTE — PROGRESS NOTES
Chief Complaint   Patient presents with    Diabetes     PCP and Pharmacy verified     History of Present Illness: Deandre Riley is a 61 y.o. male here for follow up of diabetes. Weight down 2 lbs since last visit in 11/18. Decreased the toujeo from 30 units to 28 units in the morning about a month ago and has stayed on this dose. Doesn't miss any doses of this nor the weekly trulicity. Getting the BP and cholesterol regimen daily. Fasting sugars are between 115-170 and about 50% are over 130 depending on what he ate the night before. Hasn't checked after dinner readings. Has been eating dinner well after 8pm on a regular basis the past few months and having more carbs at night and this is causing more abd pain and belching. Had an ER visit in 1/19 for diverticulitis and took augmentin. Has had more tingling in his feet. Also getting over a URI and still has a lingering cough. Current Outpatient Medications   Medication Sig    TOUJEO SOLOSTAR U-300 INSULIN 300 unit/mL (1.5 mL) inpn Inject 28 units daily--Dose change 3/11/19--updated med list--did not send prescription to the pharmacy    Insulin Needles, Disposable, (BD ULTRA-FINE SHORT PEN NEEDLE) 31 gauge x 5/16\" ndle USE FOR INJECTING INSULIN ONCE DAILY    TRULICITY 5.71 UH/8.0 mL sub-q pen INJECT 0.5ML (0.75MG) UNDER THE SKIN EVERY 7 DAYS    spironolactone (ALDACTONE) 50 mg tablet TAKE 1 TABLET BY MOUTH ONCE A DAY    atorvastatin (LIPITOR) 20 mg tablet TAKE 1 TABLET BY ORAL ROUTE EVERY DAY    amLODIPine (NORVASC) 10 mg tablet Take one tablet by mouth one time daily    lisinopril (PRINIVIL, ZESTRIL) 40 mg tablet TAKE ONE TABLET BY MOUTH ONE TIME DAILY    Cholecalciferol, Vitamin D3, 1,000 unit cap Take 1,000 Units by mouth daily.  allopurinol (ZYLOPRIM) 300 mg tablet TAKE ONE TABLET BY MOUTH ONE TIME DAILY     carvedilol (COREG) 6.25 mg tablet Take 1 Tab by mouth two (2) times daily (with meals).  (Patient taking differently: Take 6.25 mg by mouth daily.)     No current facility-administered medications for this visit. Allergies   Allergen Reactions    Ciprofloxacin Itching and Swelling    Flagyl [Metronidazole] Itching and Swelling     Review of Systems:  - Eyes: no blurry vision or double vision  - Cardiovascular: no chest pain  - Respiratory: no shortness of breath, (+) cough  - Musculoskeletal: no myalgias  - Neurological: (+) numbness/tingling in extremities    Physical Examination:  Blood pressure 118/80, pulse 84, height 5' 9\" (1.753 m), weight 213 lb 6.4 oz (96.8 kg). - General: pleasant, no distress, good eye contact   - Neck: no carotid bruits  - Cardiovascular: regular, normal rate, nl s1 and s2, no m/r/g,   - Respiratory: clear bilaterally  - Integumentary: no edema,   - Psychiatric: normal mood and affect    Data Reviewed:   Component      Latest Ref Rng & Units 3/7/2019 3/7/2019 3/7/2019 3/7/2019          11:04 AM 11:04 AM 11:04 AM 11:04 AM   Glucose      65 - 99 mg/dL   122 (H)    BUN      6 - 24 mg/dL   19    Creatinine      0.76 - 1.27 mg/dL   1.37 (H)    GFR est non-AA      >59 mL/min/1.73   56 (L)    GFR est AA      >59 mL/min/1.73   65    BUN/Creatinine ratio      9 - 20   14    Sodium      134 - 144 mmol/L   141    Potassium      3.5 - 5.2 mmol/L   4.2    Chloride      96 - 106 mmol/L   102    CO2      20 - 29 mmol/L   25    Calcium      8.7 - 10.2 mg/dL   9.7    Protein, total      6.0 - 8.5 g/dL   8.0    Albumin      3.5 - 5.5 g/dL   4.7    GLOBULIN, TOTAL      1.5 - 4.5 g/dL   3.3    A-G Ratio      1.2 - 2.2   1.4    Bilirubin, total      0.0 - 1.2 mg/dL   0.7    Alk.  phosphatase      39 - 117 IU/L   95    AST      0 - 40 IU/L   44 (H)    ALT (SGPT)      0 - 44 IU/L   58 (H)    Cholesterol, total      100 - 199 mg/dL  123     Triglyceride      0 - 149 mg/dL  150 (H)     HDL Cholesterol      >39 mg/dL  34 (L)     VLDL, calculated      5 - 40 mg/dL  30     LDL, calculated      0 - 99 mg/dL  59     Creatinine, urine Not Estab. mg/dL 150.0      Microalbumin, urine      Not Estab. ug/mL 16.7      Microalbumin/Creat. Ratio      0.0 - 30.0 mg/g creat 11.1      Hemoglobin A1c, (calculated)      4.8 - 5.6 %    9.4 (H)   Estimated average glucose      mg/dL    223       Assessment/Plan:     1. Uncontrolled type 2 diabetes mellitus with diabetic nephropathy, with long-term current use of insulin ([facility] [address]): his most recent Hgb A1c was 9.4% in 3/19 up from 8.3% in 11/18 down 13% in 7/18 up from 10.4% in 2/18 up from 6.5% in 8/17. His A1c is worse due to eating too late at night so will cut back on this and check some after meal readings. - cont toujeo 28 units daily  - cont trulicity at 6.55 mg weekly  - check bs 3-5 times daily due to fluctuating sugars and hypoglycemia  - foot exam done 11/18  - optho UTD 11/16--due now  - microalbumin 55.1 8/17, down to 8.1 in 7/18 and 11 in 3/19  - check A1c and cmp prior to next visit       2. Essential hypertension, benign: his BP was at goal < 140/90  - cont amlodipine 10 mg daily  - cont coreg 6.25 mg daily  - cont lisinopril 40 mg daily  - cont spironolactone 50 mg daily      3. Hyperlipidemia with target LDL less than 100:  in 8/17 and was just started on atorva 20 mg daily and down to 38 in 2/18.  and non- in 7/18 with A1c of 13%. LDL 59 and  in 3/19  - cont atorva 20 mg daily  - check lipids in 8 months        Patient Instructions   1) Your Hemoglobin A1c (3 month test of blood sugar) is 9.4% up from 8.3% at the last check. This is likely due to after meal spikes that you are not seeing. 2) Check blood sugars once daily either fasting (goal is ) or 2 hours after a meal (goal is less than 180). Alternate one meal a day to check (example: one day check after breakfast, one day after lunch, one day after dinner). Write down what you ate if your blood sugar is more than 180 so you can know to cut back or cut out this food from your diet.     3) Your cholesterol is much better and your urine test is normal and your creatinine (kidney test) has improved. 4) ALT and AST are markers of liver function. Your values are still slightly abnormal and likely elevated due to fatty deposition in the liver that can occur with weight gain. Hopefully with weight loss, these values will return to normal.    5) Your blood pressure is at goal.              Follow-up Disposition:  Return in about 5 months (around 8/11/2019).     Copy sent to:  Dr. Jeremy Cedeno

## 2019-05-08 ENCOUNTER — TELEPHONE (OUTPATIENT)
Dept: ENDOCRINOLOGY | Age: 59
End: 2019-05-08

## 2019-05-08 RX ORDER — FLASH GLUCOSE SCANNING READER
EACH MISCELLANEOUS
Qty: 1 EACH | Refills: 0 | Status: SHIPPED | OUTPATIENT
Start: 2019-05-08

## 2019-05-08 RX ORDER — FLASH GLUCOSE SENSOR
KIT MISCELLANEOUS
Qty: 2 KIT | Refills: 11 | Status: SHIPPED | OUTPATIENT
Start: 2019-05-08 | End: 2020-04-30

## 2019-05-08 NOTE — TELEPHONE ENCOUNTER
Pt notified of message per Dr. Faye Kan and voiced understanding of what was read to him. He stated he misunderstood my question regarding him seeing his pcp. He stated he did see him and he redirected him back to his endocrinologist .  He stated that he was told it could possibly be his dm meds. He  had a GI series the end of last year and was told by his pcp that they did not find anything wrong. Patient admitted that  he is still eating between 8-9 pm and he has not been watching his carbs. He stated that he does remember his conversation with Dr. Faye Kan regarding this and will try do as instructed.

## 2019-05-08 NOTE — TELEPHONE ENCOUNTER
Please let him know I'm happy to send a prescription for the freestyle juan c reader and sensors to his local pharmacy to see if they will be covered but given he is not on 4 shots of insulin a day or an insulin pump, he may not be covered but if he desires to pay cash he can do so. In terms of the stomach pain, last time we had discussed that he was eating dinner frequently after 8 pm and eating a lot of carbs with dinner and this was causing his stomach pain and belching. Is he still doing this? If so, then I would cut back on carbs with dinner and not eating after 8pm first to see if this helps. If he has stopped doing this and still having pain, then he may want to follow up with his PCP.

## 2019-05-08 NOTE — TELEPHONE ENCOUNTER
----- Message from Judy Maldonado sent at 5/8/2019  2:23 PM EDT -----  Regarding: Dr. Jennifer Andujar  Pt request for a call back from the doctor in regards to discussing the option have the patch in which he can check his glucose reading on his cell phone.  Best contact number is [phone]

## 2019-05-08 NOTE — TELEPHONE ENCOUNTER
Patient would like to know if he could obtain a freestyle juan c. He also stated that for the past month he has been having stomach discomfort and he did not know if it was due to his dm medication. He stated he has reg bowel movements however it is never a lot. Patient has not contacted  his pcp regarding this but would like to know which direction he should take.

## 2019-05-23 ENCOUNTER — TELEPHONE (OUTPATIENT)
Dept: SLEEP MEDICINE | Age: 59
End: 2019-05-23

## 2019-05-23 DIAGNOSIS — G47.33 OBSTRUCTIVE SLEEP APNEA (ADULT) (PEDIATRIC): Primary | ICD-10-CM

## 2019-05-29 ENCOUNTER — DOCUMENTATION ONLY (OUTPATIENT)
Dept: SLEEP MEDICINE | Age: 59
End: 2019-05-29

## 2019-07-30 RX ORDER — INSULIN GLARGINE 300 U/ML
INJECTION, SOLUTION SUBCUTANEOUS
Qty: 4.5 ML | Refills: 11 | Status: SHIPPED | OUTPATIENT
Start: 2019-07-30 | End: 2020-05-22 | Stop reason: SDUPTHER

## 2019-08-02 ENCOUNTER — OFFICE VISIT (OUTPATIENT)
Dept: ENDOCRINOLOGY | Age: 59
End: 2019-08-02

## 2019-08-02 VITALS
DIASTOLIC BLOOD PRESSURE: 84 MMHG | SYSTOLIC BLOOD PRESSURE: 128 MMHG | HEART RATE: 79 BPM | WEIGHT: 216.2 LBS | HEIGHT: 69 IN | BODY MASS INDEX: 32.02 KG/M2

## 2019-08-02 DIAGNOSIS — I10 ESSENTIAL HYPERTENSION, BENIGN: ICD-10-CM

## 2019-08-02 DIAGNOSIS — E78.5 HYPERLIPIDEMIA WITH TARGET LDL LESS THAN 100: ICD-10-CM

## 2019-08-02 LAB — HBA1C MFR BLD HPLC: 6.7 %

## 2019-08-02 NOTE — PROGRESS NOTES
Chief Complaint   Patient presents with    Diabetes     pcp and pharmacy confirmed    Labs     done    Other     eye exam is due     History of Present Illness: Shelly Torrez is a 61 y.o. male here for follow up of diabetes. Weight up 3 lbs since last visit in 3/19. Has been working harder on his diet since getting the freestyle paresh in 5/19 and has been trying not to eat after 8pm.  This has helped with his indigestion at night but can still occur if he eats a higher carb meal after 8. Review of his paresh tracings shows he is able to keep his sugars between  as long as he doesn't eat too many carbs but can still spike to about 250 a few times a week when he's not as careful. Has been off the atorvastatin the past few months as his PCP didn't refill so I will check his lipids today and told him he will likely need to go back on this. Has had some intermittent tingling and numbness in his right hand while he sleeps that may be from his ulnar nerve or carpal tunnel and told him if it continues, may need to see an orthopedist.    Current Outpatient Medications   Medication Sig    TOUJEO SOLOSTAR U-300 INSULIN 300 unit/mL (1.5 mL) inpn pen INJECT 28 UNITS UNDER THE SKIN ONCE DAILY    FREESTYLE PARESH 14 DAY SENSOR kit Use as directed every 14 days    FREESTYLE PARESH 14 DAY READER misc Use as directed    Insulin Needles, Disposable, (BD ULTRA-FINE SHORT PEN NEEDLE) 31 gauge x 5/16\" ndle USE FOR INJECTING INSULIN ONCE DAILY    TRULICITY 5.60 HG/9.2 mL sub-q pen INJECT 0.5ML (0.75MG) UNDER THE SKIN EVERY 7 DAYS    spironolactone (ALDACTONE) 50 mg tablet TAKE 1 TABLET BY MOUTH ONCE A DAY    amLODIPine (NORVASC) 10 mg tablet Take one tablet by mouth one time daily    lisinopril (PRINIVIL, ZESTRIL) 40 mg tablet TAKE ONE TABLET BY MOUTH ONE TIME DAILY    Cholecalciferol, Vitamin D3, 1,000 unit cap Take 1,000 Units by mouth daily.     allopurinol (ZYLOPRIM) 300 mg tablet TAKE ONE TABLET BY MOUTH ONE TIME DAILY     carvedilol (COREG) 6.25 mg tablet Take 1 Tab by mouth two (2) times daily (with meals). (Patient taking differently: Take 6.25 mg by mouth daily.)    atorvastatin (LIPITOR) 20 mg tablet TAKE 1 TABLET BY ORAL ROUTE EVERY DAY     No current facility-administered medications for this visit. Allergies   Allergen Reactions    Ciprofloxacin Itching and Swelling    Flagyl [Metronidazole] Itching and Swelling     Review of Systems:  - Eyes: no blurry vision or double vision  - Cardiovascular: no chest pain  - Respiratory: no shortness of breath  - Musculoskeletal: no myalgias  - Neurological: no numbness/tingling in extremities    Physical Examination:  Blood pressure 128/84, pulse 79, height 5' 9\" (1.753 m), weight 216 lb 3.2 oz (98.1 kg). - General: pleasant, no distress, good eye contact   - Neck: no carotid bruits  - Cardiovascular: regular, normal rate, nl s1 and s2, no m/r/g,   - Respiratory: clear bilaterally  - Integumentary: no edema,   - Psychiatric: normal mood and affect    Data Reviewed:   Component      Latest Ref Rng & Units 8/2/2019           2:45 PM   Hemoglobin A1c (POC)      % 6.7       Assessment/Plan:     1. Uncontrolled type 2 diabetes mellitus with diabetic nephropathy, with long-term current use of insulin ([facility] [address]): his most recent Hgb A1c was 6.7% in 8/19 down from 9.4% in 3/19 up from 8.3% in 11/18 down 13% in 7/18 up from 10.4% in 2/18 up from 6.5% in 8/17. His A1c is much better after using freestyle juan c so won't make any changes. - cont toujeo 28 units daily  - cont trulicity at 0.65 mg weekly  - check bs 3-5 times daily due to fluctuating sugars and hypoglycemia  - cont freestyle juan c 14 day system  - foot exam done 11/18  - optho UTD 11/16--due now  - microalbumin 55.1 8/17, down to 8.1 in 7/18 and 11 in 3/19  - check A1c and cmp and microalbumin prior to next visit       2.  Essential hypertension, benign: his BP was at goal < 140/90  - cont amlodipine 10 mg daily  - cont coreg 6.25 mg daily  - cont lisinopril 40 mg daily  - cont spironolactone 50 mg daily      3. Hyperlipidemia with target LDL less than 100:  in 8/17 and was just started on atorva 20 mg daily and down to 38 in 2/18.  and non- in 7/18 with A1c of 13%. LDL 59 and  in 3/19. Currently off atorva  - off atorva 20 mg daily  - check lipids and cmp today  - check lipids prior to next visit          Patient Instructions   1) Download the [employer] (make sure you allow locations and choose the [facility] portal and choose to receive notifications) so you can communicate with me through the patient portal.  I will send you a message with your labs results. 2) Your A1c is 6.7% down from 9.4% which is the best it's been in 2 years. Keep up the good work. 3) I will repeat your cholesterol off the atorvastatin as you likely will need to go back on this. Follow-up and Dispositions    · Return in about 6 months (around 2/2/2020). Copy sent to:  Dr. Donna Wheeler    Lab follow up: 8/3/19  Component      Latest Ref Rng & Units 8/2/2019 8/2/2019           3:13 PM  3:13 PM   Glucose      65 - 99 mg/dL 187 (H)    BUN      6 - 24 mg/dL 17    Creatinine      0.76 - 1.27 mg/dL 1.55 (H)    GFR est non-AA      >59 mL/min/1.73 48 (L)    GFR est AA      >59 mL/min/1.73 56 (L)    BUN/Creatinine ratio      9 - 20 11    Sodium      134 - 144 mmol/L 140    Potassium      3.5 - 5.2 mmol/L 3.9    Chloride      96 - 106 mmol/L 102    CO2      20 - 29 mmol/L 23    Calcium      8.7 - 10.2 mg/dL 9.3    Protein, total      6.0 - 8.5 g/dL 7.2    Albumin      3.5 - 5.5 g/dL 3.9    GLOBULIN, TOTAL      1.5 - 4.5 g/dL 3.3    A-G Ratio      1.2 - 2.2 1.2    Bilirubin, total      0.0 - 1.2 mg/dL 0.5    Alk.  phosphatase      39 - 117 IU/L 75    AST      0 - 40 IU/L 39    ALT (SGPT)      0 - 44 IU/L 47 (H)    Cholesterol, total      100 - 199 mg/dL  155   Triglyceride      0 - 149 mg/dL  300 (H)   HDL Cholesterol      >39 mg/dL  26 (L)   VLDL, calculated      5 - 40 mg/dL  60 (H)   LDL, calculated      0 - 99 mg/dL  69     Sent him the following message through [employer]:    Total Cholesterol is the total number of cholesterol particles in your blood. Goal is less than 200. Your value is up from 123. Triglycerides are the short term fats in your blood. Goal is less than 150. Your value is up from 150. HDL is the good cholesterol in your blood. Goal is more than 50 if you are a woman and 40 if you are a man. Your value is down from 34. LDL is the bad cholesterol in your blood. Goal is less than 100 unless you have heart disease and then goal is under 70. Your value is up from 61. Your cholesterol is now not as well controlled off the atorvastatin so I would like you to restart 20 mg 1 tab at bedtime and sent this to your local pharmacy. Continue to follow a low cholesterol diet. Try to limit the amount of fried foods, fatty foods, butter, gravy, red meat, ice cream, cheese, and eggs in your diet, which are all high in cholesterol.  -------------------------------------------------------------------------------------------------------------------  BUN and creatinine are markers of kidney function. Your creatinine is abnormal and up from 1.37 at your last check which means you may be getting more dehydrated. To be safe, please decrease your spironolactone to 25 mg daily. I sent this to your local pharmacy today. Use up the 50 mg tabs taking 1/2 tab everyday.  -------------------------------------------------------------------------------------------------------------------  ALT and AST are markers of liver function.   Your ALT is slightly abnormal but improved from 58 at last check and your AST is back to normal.

## 2019-08-02 NOTE — PATIENT INSTRUCTIONS
1) Download the [place] (make sure you allow locations and choose the [facility] portal and choose to receive notifications) so you can communicate with me through the patient portal.  I will send you a message with your labs results. 2) Your A1c is 6.7% down from 9.4% which is the best it's been in 2 years. Keep up the good work. 3) I will repeat your cholesterol off the atorvastatin as you likely will need to go back on this.

## 2019-08-03 LAB
ALBUMIN SERPL-MCNC: 3.9 G/DL (ref 3.5–5.5)
ALBUMIN/GLOB SERPL: 1.2 {RATIO} (ref 1.2–2.2)
ALP SERPL-CCNC: 75 IU/L (ref 39–117)
ALT SERPL-CCNC: 47 IU/L (ref 0–44)
AST SERPL-CCNC: 39 IU/L (ref 0–40)
BILIRUB SERPL-MCNC: 0.5 MG/DL (ref 0–1.2)
BUN SERPL-MCNC: 17 MG/DL (ref 6–24)
BUN/CREAT SERPL: 11 (ref 9–20)
CALCIUM SERPL-MCNC: 9.3 MG/DL (ref 8.7–10.2)
CHLORIDE SERPL-SCNC: 102 MMOL/L (ref 96–106)
CHOLEST SERPL-MCNC: 155 MG/DL (ref 100–199)
CO2 SERPL-SCNC: 23 MMOL/L (ref 20–29)
CREAT SERPL-MCNC: 1.55 MG/DL (ref 0.76–1.27)
GLOBULIN SER CALC-MCNC: 3.3 G/DL (ref 1.5–4.5)
GLUCOSE SERPL-MCNC: 187 MG/DL (ref 65–99)
HDLC SERPL-MCNC: 26 MG/DL
INTERPRETATION, 910389: NORMAL
INTERPRETATION: NORMAL
LDLC SERPL CALC-MCNC: 69 MG/DL (ref 0–99)
Lab: NORMAL
PDF IMAGE, 910387: NORMAL
POTASSIUM SERPL-SCNC: 3.9 MMOL/L (ref 3.5–5.2)
PROT SERPL-MCNC: 7.2 G/DL (ref 6–8.5)
SODIUM SERPL-SCNC: 140 MMOL/L (ref 134–144)
TRIGL SERPL-MCNC: 300 MG/DL (ref 0–149)
VLDLC SERPL CALC-MCNC: 60 MG/DL (ref 5–40)

## 2019-08-03 RX ORDER — ATORVASTATIN CALCIUM 20 MG/1
TABLET, FILM COATED ORAL
Qty: 90 TAB | Refills: 3 | Status: SHIPPED | OUTPATIENT
Start: 2019-08-03 | End: 2020-09-07

## 2019-08-03 RX ORDER — SPIRONOLACTONE 25 MG/1
TABLET ORAL
Qty: 90 TAB | Refills: 3 | Status: SHIPPED | OUTPATIENT
Start: 2019-08-03 | End: 2020-01-15 | Stop reason: SDUPTHER

## 2019-10-04 ENCOUNTER — TELEPHONE (OUTPATIENT)
Dept: SLEEP MEDICINE | Age: 59
End: 2019-10-04

## 2019-10-04 DIAGNOSIS — G47.33 OBSTRUCTIVE SLEEP APNEA (ADULT) (PEDIATRIC): Primary | ICD-10-CM

## 2019-10-04 NOTE — TELEPHONE ENCOUNTER
Patient called into the office to have an order for a new cpap device faxed to [facility]. Please put order in system.

## 2019-10-22 ENCOUNTER — DOCUMENTATION ONLY (OUTPATIENT)
Dept: SLEEP MEDICINE | Age: 59
End: 2019-10-22

## 2019-12-19 RX ORDER — DULAGLUTIDE 0.75 MG/.5ML
INJECTION, SOLUTION SUBCUTANEOUS
Qty: 2 ML | Refills: 11 | Status: SHIPPED | OUTPATIENT
Start: 2019-12-19 | End: 2020-08-31 | Stop reason: SDUPTHER

## 2020-01-15 RX ORDER — SPIRONOLACTONE 25 MG/1
TABLET ORAL
Qty: 90 TAB | Refills: 3 | Status: SHIPPED | OUTPATIENT
Start: 2020-01-15 | End: 2021-03-05

## 2020-02-04 LAB
ALBUMIN SERPL-MCNC: 4.5 G/DL (ref 3.8–4.9)
ALBUMIN/CREAT UR: 10 MG/G CREAT (ref 0–29)
ALBUMIN/GLOB SERPL: 1.6 {RATIO} (ref 1.2–2.2)
ALP SERPL-CCNC: 88 IU/L (ref 39–117)
ALT SERPL-CCNC: 48 IU/L (ref 0–44)
AST SERPL-CCNC: 39 IU/L (ref 0–40)
BILIRUB SERPL-MCNC: 1 MG/DL (ref 0–1.2)
BUN SERPL-MCNC: 19 MG/DL (ref 8–27)
BUN/CREAT SERPL: 11 (ref 10–24)
CALCIUM SERPL-MCNC: 10.1 MG/DL (ref 8.6–10.2)
CHLORIDE SERPL-SCNC: 101 MMOL/L (ref 96–106)
CHOLEST SERPL-MCNC: 121 MG/DL (ref 100–199)
CO2 SERPL-SCNC: 17 MMOL/L (ref 20–29)
CREAT SERPL-MCNC: 1.78 MG/DL (ref 0.76–1.27)
CREAT UR-MCNC: 232.1 MG/DL
EST. AVERAGE GLUCOSE BLD GHB EST-MCNC: 237 MG/DL
GLOBULIN SER CALC-MCNC: 2.9 G/DL (ref 1.5–4.5)
GLUCOSE SERPL-MCNC: 138 MG/DL (ref 65–99)
HBA1C MFR BLD: 9.9 % (ref 4.8–5.6)
HDLC SERPL-MCNC: 29 MG/DL
INTERPRETATION, 910389: NORMAL
INTERPRETATION: NORMAL
LDLC SERPL CALC-MCNC: 62 MG/DL (ref 0–99)
Lab: NORMAL
MICROALBUMIN UR-MCNC: 23.8 UG/ML
PDF IMAGE, 910387: NORMAL
POTASSIUM SERPL-SCNC: 4.4 MMOL/L (ref 3.5–5.2)
PROT SERPL-MCNC: 7.4 G/DL (ref 6–8.5)
SODIUM SERPL-SCNC: 142 MMOL/L (ref 134–144)
TRIGL SERPL-MCNC: 151 MG/DL (ref 0–149)
VLDLC SERPL CALC-MCNC: 30 MG/DL (ref 5–40)

## 2020-02-07 ENCOUNTER — OFFICE VISIT (OUTPATIENT)
Dept: ENDOCRINOLOGY | Age: 60
End: 2020-02-07

## 2020-02-07 VITALS
WEIGHT: 210.6 LBS | BODY MASS INDEX: 31.1 KG/M2 | SYSTOLIC BLOOD PRESSURE: 139 MMHG | DIASTOLIC BLOOD PRESSURE: 87 MMHG | HEART RATE: 76 BPM

## 2020-02-07 DIAGNOSIS — E78.5 HYPERLIPIDEMIA WITH TARGET LDL LESS THAN 100: ICD-10-CM

## 2020-02-07 DIAGNOSIS — I10 ESSENTIAL HYPERTENSION, BENIGN: ICD-10-CM

## 2020-02-07 PROBLEM — N18.30 CKD (CHRONIC KIDNEY DISEASE) STAGE 3, GFR 30-59 ML/MIN (HCC): Status: ACTIVE | Noted: 2020-02-07

## 2020-02-07 NOTE — PROGRESS NOTES
Chief Complaint   Patient presents with    Diabetes     pcp and pharmacy confirmed    Diabetic Foot Exam     due     History of Present Illness: Dejan Costello is a 61 y.o. male here for follow up of diabetes. Weight down 6 lbs since last visit in 8/19. Still wearing paresh and compliant with all meds. Admits to being off track with diet over the past few months due to family stress and wasn't surprised to see his A1c where it is as home readings have been frequently over 200. However, since his 60th birthday last month has been back on track and his sugars are coming back down to where they used to be and fasting sugar on his labs was in the 130s. Compliant with atorvastatin and had been taking during the day but just moved this to bedtime after reading the directions on the bottle. Compliant with BP regimen. Current Outpatient Medications   Medication Sig    spironolactone (ALDACTONE) 25 mg tablet TAKE 1 TABLET BY MOUTH ONCE A DAY--NEW LOWER DOSE REPLACES PRIOR SCRIPT ON FILE FOR 50 MG DOSE    TRULICITY 0.86 WJ/6.9 mL sub-q pen INJECT 0.5ML (0.75MG) UNDER THE SKIN EVERY 7 DAYS    atorvastatin (LIPITOR) 20 mg tablet TAKE 1 TABLET BY ORAL ROUTE EVERY BEDTIME    TOUJEO SOLOSTAR U-300 INSULIN 300 unit/mL (1.5 mL) inpn pen INJECT 28 UNITS UNDER THE SKIN ONCE DAILY    FREESTYLE PARESH 14 DAY SENSOR kit Use as directed every 14 days    FREESTYLE PARESH 14 DAY READER misc Use as directed    Insulin Needles, Disposable, (BD ULTRA-FINE SHORT PEN NEEDLE) 31 gauge x 5/16\" ndle USE FOR INJECTING INSULIN ONCE DAILY    amLODIPine (NORVASC) 10 mg tablet Take one tablet by mouth one time daily    lisinopril (PRINIVIL, ZESTRIL) 40 mg tablet TAKE ONE TABLET BY MOUTH ONE TIME DAILY    Cholecalciferol, Vitamin D3, 1,000 unit cap Take 1,000 Units by mouth daily.     allopurinol (ZYLOPRIM) 300 mg tablet TAKE ONE TABLET BY MOUTH ONE TIME DAILY     carvedilol (COREG) 6.25 mg tablet Take 1 Tab by mouth two (2) times daily (with meals). (Patient taking differently: Take 6.25 mg by mouth daily.)     No current facility-administered medications for this visit. Allergies   Allergen Reactions    Ciprofloxacin Itching and Swelling    Flagyl [Metronidazole] Itching and Swelling     Review of Systems:  - Eyes: no blurry vision or double vision  - Cardiovascular: no chest pain  - Respiratory: no shortness of breath  - Musculoskeletal: no myalgias  - Neurological: (+) numbness/tingling in extremities    Physical Examination:  Blood pressure 139/87, pulse 76, weight 210 lb 9.6 oz (95.5 kg). - General: pleasant, no distress, good eye contact   - Neck: no carotid bruits  - Cardiovascular: regular, normal rate, nl s1 and s2, no m/r/g,   - Respiratory: clear bilaterally  - Integumentary: no edema,   - Psychiatric: normal mood and affect    Diabetic foot exam:     Left Foot:   Visual Exam: callous - mild   Pulse DP: 2+ (normal)   Filament test: normal sensation    Vibratory sensation: normal      Right Foot:   Visual Exam: callous - mild   Pulse DP: 2+ (normal)   Filament test: normal sensation    Vibratory sensation: normal        Data Reviewed:   Component      Latest Ref Rng & Units 2/3/2020 2/3/2020 2/3/2020 2/3/2020           1:25 PM  1:25 PM  1:25 PM  1:25 PM   Glucose      65 - 99 mg/dL   138 (H)    BUN      8 - 27 mg/dL   19    Creatinine      0.76 - 1.27 mg/dL   1.78 (H)    GFR est non-AA      >59 mL/min/1.73   41 (L)    GFR est AA      >59 mL/min/1.73   47 (L)    BUN/Creatinine ratio      10 - 24   11    Sodium      134 - 144 mmol/L   142    Potassium      3.5 - 5.2 mmol/L   4.4    Chloride      96 - 106 mmol/L   101    CO2      20 - 29 mmol/L   17 (L)    Calcium      8.6 - 10.2 mg/dL   10.1    Protein, total      6.0 - 8.5 g/dL   7.4    Albumin      3.8 - 4.9 g/dL   4.5    GLOBULIN, TOTAL      1.5 - 4.5 g/dL   2.9    A-G Ratio      1.2 - 2.2   1.6    Bilirubin, total      0.0 - 1.2 mg/dL   1.0    Alk.  phosphatase      39 - 117 IU/L   88    AST      0 - 40 IU/L   39    ALT (SGPT)      0 - 44 IU/L   48 (H)    Cholesterol, total      100 - 199 mg/dL  121     Triglyceride      0 - 149 mg/dL  151 (H)     HDL Cholesterol      >39 mg/dL  29 (L)     VLDL, calculated      5 - 40 mg/dL  30     LDL, calculated      0 - 99 mg/dL  62     Creatinine, urine      Not Estab. mg/dL    232.1   Microalbumin, urine      Not Estab. ug/mL    23.8   Microalbumin/Creat. Ratio      0 - 29 mg/g creat    10   Hemoglobin A1c, (calculated)      4.8 - 5.6 % 9.9 (H)      Estimated average glucose      mg/dL 237        Assessment/Plan:     1. Uncontrolled type 2 diabetes mellitus with diabetic nephropathy, with long-term current use of insulin ([facility] [address]): his most recent Hgb A1c was 9.9% in 2/20 up from 6.7% in 8/19 down from 9.4% in 3/19 up from 8.3% in 11/18 down 13% in 7/18 up from 10.4% in 2/18 up from 6.5% in 8/17. His A1c is worse due to diet and stress but recently sugars have come back down with getting back on track so didn't make any changes. We agreed to check labs in 3 months to check progress. - cont toujeo 28 units daily  - cont trulicity at 3.08 mg weekly  - check bs 3-5 times daily due to fluctuating sugars and hypoglycemia  - cont freestyle juan c 14 day system  - foot exam done 2/20  - optho UTD 11/19--will obtain report  - microalbumin 55.1 8/17, down to 8.1 in 7/18 and 11 in 3/19 and 10 in 2/20  - check A1c and cmp and microalbumin prior to next visit   - check A1c and bmp in 3 months      2. Essential hypertension, benign: his BP was at goal < 140/90  - cont amlodipine 10 mg daily  - cont coreg 6.25 mg daily  - cont lisinopril 40 mg daily  - cont spironolactone 50 mg daily      3. Hyperlipidemia with target LDL less than 100:  in 8/17 and was just started on atorva 20 mg daily and down to 38 in 2/18.  and non- in 7/18 with A1c of 13%. LDL 59 and  in 3/19.   Was off atorva in 8/19 and LDL 69,  so restarted and LDL 62 and  in 2/20  - cont atorva 20 mg daily  - check lipids prior to next visit          Patient Instructions   1) Your A1c mk from 6.7% to 9.9% but you are currently getting back on track so I won't make any changes. 2) Plan on repeating your labs in 3 months and I'll message through [employer]. 3) Your cholesterol is much better back on the atorvastatin. 4) Your creatinine (kidney test) is slightly higher but overall stable over the past 10 years. 5) Your blood pressure is at goal.          Follow-up and Dispositions    · Return in about 6 months (around 8/7/2020). Copy sent to:  Dr. Compa Mancilla    Lab follow up: 5/16/20  Component      Latest Ref Rng & Units 5/15/2020 5/15/2020           9:45 AM  9:45 AM   Glucose      65 - 99 mg/dL 116 (H)    BUN      8 - 27 mg/dL 23    Creatinine      0.76 - 1.27 mg/dL 1.72 (H)    GFR est non-AA      >59 mL/min/1.73 42 (L)    GFR est AA      >59 mL/min/1.73 49 (L)    BUN/Creatinine ratio      10 - 24 13    Sodium      134 - 144 mmol/L 143    Potassium      3.5 - 5.2 mmol/L 4.6    Chloride      96 - 106 mmol/L 104    CO2      20 - 29 mmol/L 23    Calcium      8.6 - 10.2 mg/dL 10.3 (H)    Hemoglobin A1c, (calculated)      4.8 - 5.6 %  7.9 (H)   Estimated average glucose      mg/dL  180     Sent him the following message through [employer]:  Your Hemoglobin A1c (3 month test of blood sugar) is 7.9% which is down from 9.9% so your diabetes is under better control.  -------------------------------------------------------------------------------------------------------------------  BUN and creatinine are markers of kidney function. Your creatinine is abnormal at 1.72 but down from 1.78 at last check.

## 2020-02-07 NOTE — PATIENT INSTRUCTIONS
1) Your A1c mk from 6.7% to 9.9% but you are currently getting back on track so I won't make any changes. 2) Plan on repeating your labs in 3 months and I'll message through [employer]. 3) Your cholesterol is much better back on the atorvastatin. 4) Your creatinine (kidney test) is slightly higher but overall stable over the past 10 years.  
 
5) Your blood pressure is at goal.

## 2020-03-05 RX ORDER — PEN NEEDLE, DIABETIC 30 GX3/16"
NEEDLE, DISPOSABLE MISCELLANEOUS
Qty: 100 PEN NEEDLE | Refills: 13 | Status: SHIPPED | OUTPATIENT
Start: 2020-03-05 | End: 2021-04-10 | Stop reason: SDUPTHER

## 2020-04-30 RX ORDER — FLASH GLUCOSE SENSOR
KIT MISCELLANEOUS
Qty: 6 KIT | Refills: 3 | Status: SHIPPED | OUTPATIENT
Start: 2020-04-30 | End: 2021-04-22 | Stop reason: SDUPTHER

## 2020-05-07 DIAGNOSIS — I10 ESSENTIAL HYPERTENSION, BENIGN: ICD-10-CM

## 2020-05-07 DIAGNOSIS — E78.5 HYPERLIPIDEMIA WITH TARGET LDL LESS THAN 100: ICD-10-CM

## 2020-05-16 LAB
BUN SERPL-MCNC: 23 MG/DL (ref 8–27)
BUN/CREAT SERPL: 13 (ref 10–24)
CALCIUM SERPL-MCNC: 10.3 MG/DL (ref 8.6–10.2)
CHLORIDE SERPL-SCNC: 104 MMOL/L (ref 96–106)
CO2 SERPL-SCNC: 23 MMOL/L (ref 20–29)
CREAT SERPL-MCNC: 1.72 MG/DL (ref 0.76–1.27)
EST. AVERAGE GLUCOSE BLD GHB EST-MCNC: 180 MG/DL
GLUCOSE SERPL-MCNC: 116 MG/DL (ref 65–99)
HBA1C MFR BLD: 7.9 % (ref 4.8–5.6)
INTERPRETATION: NORMAL
Lab: NORMAL
POTASSIUM SERPL-SCNC: 4.6 MMOL/L (ref 3.5–5.2)
SODIUM SERPL-SCNC: 143 MMOL/L (ref 134–144)

## 2020-05-22 ENCOUNTER — TELEPHONE (OUTPATIENT)
Dept: ENDOCRINOLOGY | Age: 60
End: 2020-05-22

## 2020-05-22 RX ORDER — INSULIN GLARGINE 300 U/ML
INJECTION, SOLUTION SUBCUTANEOUS
Qty: 4.5 ML | Refills: 11 | Status: SHIPPED | OUTPATIENT
Start: 2020-05-22 | End: 2020-08-14

## 2020-05-22 NOTE — TELEPHONE ENCOUNTER
Please let him know our office was closed yesterday and we received a fax stating that his toujeo prescription was inactivated so I resent a new one right now so he should be able to go and get this filled.

## 2020-05-22 NOTE — TELEPHONE ENCOUNTER
----- Message from Anju Goel sent at 5/21/2020  3:55 PM EDT -----  Regarding: Dr Viet Holm first and last name:      Reason for call: pt said when he called again today he was told that his tajao insulin was still on hold,      Callback required yes/no and why:yes for reason given above      Best contact number(s):[phone]       Details to clarify the request:      Anju Goel

## 2020-06-30 ENCOUNTER — OFFICE VISIT (OUTPATIENT)
Dept: SURGERY | Age: 60
End: 2020-06-30

## 2020-06-30 VITALS
RESPIRATION RATE: 18 BRPM | WEIGHT: 211.1 LBS | DIASTOLIC BLOOD PRESSURE: 81 MMHG | SYSTOLIC BLOOD PRESSURE: 151 MMHG | BODY MASS INDEX: 31.27 KG/M2 | OXYGEN SATURATION: 99 % | HEART RATE: 71 BPM | HEIGHT: 69 IN | TEMPERATURE: 96.2 F

## 2020-06-30 DIAGNOSIS — K57.92 ACUTE DIVERTICULITIS: Primary | ICD-10-CM

## 2020-06-30 DIAGNOSIS — M62.08 DIASTASIS RECTI: ICD-10-CM

## 2020-06-30 DIAGNOSIS — K42.9 UMBILICAL HERNIA WITHOUT OBSTRUCTION AND WITHOUT GANGRENE: ICD-10-CM

## 2020-06-30 RX ORDER — OMEPRAZOLE 40 MG/1
CAPSULE, DELAYED RELEASE ORAL
COMMUNITY
Start: 2020-06-17 | End: 2021-02-12

## 2020-06-30 RX ORDER — CEPHALEXIN 500 MG/1
500 CAPSULE ORAL 4 TIMES DAILY
Qty: 40 CAP | Refills: 0 | Status: SHIPPED | OUTPATIENT
Start: 2020-06-30 | End: 2020-07-10

## 2020-06-30 NOTE — PROGRESS NOTES
HISTORY OF PRESENT ILLNESS  David Blanco III is a 61 y.o. male who comes in for consultation by Fantasma Vega MD for a hernia  HPI  He was being treated for diverticulitis and was found to have upper abdominal swelling and there was concern for a hernia. He has not had abdominal surgery in the past.  Last colonoscopy was in 2019. He has been having almost yearly bouts of diverticulitis. He states that it is very bothersome at present. The upper abdomen does not hurt. He denies fever, chills or sweats. He has some heartburn as well as bloating and constipation, but he denies nausea, vomiting, diarrhea, melena or hematochezia. Past Medical History:   Diagnosis Date    Arthritis     Diabetes ([address] 2009    GERD (gastroesophageal reflux disease)     Gout     Hyperlipidemia LDL goal < 100 3/5/2013    Hypertension     MARIA DEL CARMEN on CPAP     Renal insufficiency 4/7/2011     Past Surgical History:   Procedure Laterality Date    HX HEENT      ingrown removed from back of scalp    HX ROTATOR CUFF REPAIR      HX SHOULDER ARTHROSCOPY Bilateral      Family History   Problem Relation Age of Onset    Diabetes Father     Cancer Father         prostate    Hypertension Father     No Known Problems Mother     Diabetes Sister     No Known Problems Brother      Social History     Tobacco Use    Smoking status: Never Smoker    Smokeless tobacco: Never Used   Substance Use Topics    Alcohol use: No     Alcohol/week: 0.0 standard drinks    Drug use: No     Current Outpatient Medications   Medication Sig    omeprazole (PRILOSEC) 40 mg capsule TAKE 1 CAPSULE BY MOUTH EVERY DAY BEFORE A MEAL    cephALEXin (KEFLEX) 500 mg capsule Take 1 Cap by mouth four (4) times daily for 10 days.     Toujeo SoloStar U-300 Insulin 300 unit/mL (1.5 mL) inpn pen INJECT 28 UNITS UNDER THE SKIN ONCE DAILY    FreeStyle Oliva 14 Day Sensor kit USE AS DIRECTED EVERY 14 DAYS    Insulin Needles, Disposable, 31 gauge x 5/16\" ndle USE FOR INJECTING INSULIN ONCE DAILY    spironolactone (ALDACTONE) 25 mg tablet TAKE 1 TABLET BY MOUTH ONCE A DAY--NEW LOWER DOSE REPLACES PRIOR SCRIPT ON FILE FOR 50 MG DOSE    TRULICITY 8.49 GM/2.5 mL sub-q pen INJECT 0.5ML (0.75MG) UNDER THE SKIN EVERY 7 DAYS    atorvastatin (LIPITOR) 20 mg tablet TAKE 1 TABLET BY ORAL ROUTE EVERY BEDTIME    FREESTYLE PARESH 14 DAY READER misc Use as directed    amLODIPine (NORVASC) 10 mg tablet Take one tablet by mouth one time daily    lisinopril (PRINIVIL, ZESTRIL) 40 mg tablet TAKE ONE TABLET BY MOUTH ONE TIME DAILY    Cholecalciferol, Vitamin D3, 1,000 unit cap Take 1,000 Units by mouth daily.  allopurinol (ZYLOPRIM) 300 mg tablet TAKE ONE TABLET BY MOUTH ONE TIME DAILY     carvedilol (COREG) 6.25 mg tablet Take 1 Tab by mouth two (2) times daily (with meals). (Patient taking differently: Take 6.25 mg by mouth daily.)     No current facility-administered medications for this visit. Allergies   Allergen Reactions    Ciprofloxacin Itching and Swelling    Flagyl [Metronidazole] Itching and Swelling       Review of Systems   Constitutional: Positive for malaise/fatigue. Negative for chills, diaphoresis, fever and weight loss. HENT: Positive for hearing loss. Negative for congestion, ear pain and sore throat. Eyes: Negative for blurred vision and pain. Respiratory: Positive for cough. Negative for hemoptysis, sputum production, shortness of breath, wheezing and stridor. Cardiovascular: Negative for chest pain, palpitations, orthopnea, claudication, leg swelling and PND. Gastrointestinal: Positive for abdominal pain, constipation and heartburn. Negative for blood in stool, diarrhea, melena, nausea and vomiting. Genitourinary: Negative for dysuria, flank pain, frequency, hematuria and urgency. Musculoskeletal: Positive for back pain. Negative for joint pain, myalgias and neck pain. Skin: Negative for itching and rash.    Neurological: Negative for dizziness, tremors, focal weakness, seizures, weakness and headaches. Endo/Heme/Allergies: Negative for polydipsia. Psychiatric/Behavioral: Negative for depression and memory loss. The patient is not nervous/anxious. Visit Vitals  /81 (BP 1 Location: Left arm, BP Patient Position: Sitting)   Pulse 71   Temp (!) 96.2 °F (35.7 °C) (Oral)   Resp 18   Ht 5' 9\" (1.753 m)   Wt 95.8 kg (211 lb 1.6 oz)   SpO2 99%   BMI 31.17 kg/m²       Physical Exam  Constitutional:       General: He is not in acute distress. Appearance: Normal appearance. He is well-developed. He is not diaphoretic. HENT:      Head: Normocephalic and atraumatic. Mouth/Throat:      Pharynx: No oropharyngeal exudate. Eyes:      General: No scleral icterus. Conjunctiva/sclera: Conjunctivae normal.      Pupils: Pupils are equal, round, and reactive to light. Neck:      Musculoskeletal: Normal range of motion and neck supple. Thyroid: No thyromegaly. Trachea: No tracheal deviation. Cardiovascular:      Rate and Rhythm: Normal rate and regular rhythm. Heart sounds: Normal heart sounds. No murmur. No friction rub. No gallop. Pulmonary:      Effort: Pulmonary effort is normal. No respiratory distress. Breath sounds: Normal breath sounds. No stridor. No wheezing or rales. Abdominal:      General: Bowel sounds are normal. There is no distension. Palpations: Abdomen is soft. There is no mass. Tenderness: There is abdominal tenderness in the left lower quadrant. There is no guarding or rebound. Hernia: A hernia (med-large rectus diastasis and very small umbilical hernia) is present. Hernia is present in the umbilical area (very small). There is no hernia in the ventral area, right inguinal area or left inguinal area. Genitourinary:     Penis: Circumcised. Scrotum/Testes: Normal. Cremasteric reflex is present. Musculoskeletal: Normal range of motion. General: No tenderness. Lymphadenopathy:      Cervical: No cervical adenopathy. Skin:     General: Skin is warm and dry. Findings: No erythema or rash. Neurological:      Mental Status: He is alert and oriented to person, place, and time. Cranial Nerves: No cranial nerve deficit. Coordination: Coordination normal.   Psychiatric:         Behavior: Behavior normal.         Thought Content: Thought content normal.         Judgment: Judgment normal.       I reviewed his CT images from Jan 2019 with him today    ASSESSMENT and PLAN  1. Mild acute recurrent diverticulitis. I explained to him about the anatomy and pathophysiology of the disease. Hopefully it can be managed non operatively. He has had numerous attacks and may ultimately benefit from resection    2. Large rectus diastasis. I explained to him about the anatomy and pathophysiology and benign nature. It primarily cosmetic at this time  3. Tiny umbilical hernia with preperitoneal fat, asymptomatic. I explained about the anatomy and pathophysiology of hernias and the risk of incarceration and strangulation of the bowel. I explained about hernia repairs (open with and without mesh, and robotic assisted and laparoscopic with mesh). I explained the risks and benefits of repair including bleeding, infection, chronic pain, orchalgia, loss of testes, bowel or bladder injury, hernia recurrence, seroma, mesh infection requiring removal.  I explained it would be a six to eight week recuperation with no driving for 5 - 7 days, no lifting for six weeks. 4.  NIDDM type 2. Poorly controled. Last Hgb A1c was 9.9  Followed by Dr Emmett Salvador  This will make his other issues more difficult to manage. 5.  Essential hypertension. Stable on rx  6. Hypercholesterolemia. On statin  7. Hx gout. Not currently active.   Using allopurinol    At this point he prefers observation of the small umbilical hernia and the rectus diastasis is primarily cosmetic at this time    Rx for keflex for the diverticulitis.     If not improving would favor a CT and possible interval sigmoid resection    Margot Gillette MD FACS

## 2020-06-30 NOTE — PROGRESS NOTES
Chief Complaint   Patient presents with    Possible Hernia     Seen at the request of Dr. Alton Moreira, eval hernia.

## 2020-07-20 DIAGNOSIS — E78.5 HYPERLIPIDEMIA WITH TARGET LDL LESS THAN 100: ICD-10-CM

## 2020-07-20 DIAGNOSIS — I10 ESSENTIAL HYPERTENSION, BENIGN: ICD-10-CM

## 2020-08-11 LAB
ALBUMIN SERPL-MCNC: 4.3 G/DL (ref 3.8–4.9)
ALBUMIN/GLOB SERPL: 1.4 {RATIO} (ref 1.2–2.2)
ALP SERPL-CCNC: 82 IU/L (ref 39–117)
ALT SERPL-CCNC: 42 IU/L (ref 0–44)
AST SERPL-CCNC: 35 IU/L (ref 0–40)
BILIRUB SERPL-MCNC: 1 MG/DL (ref 0–1.2)
BUN SERPL-MCNC: 22 MG/DL (ref 8–27)
BUN/CREAT SERPL: 13 (ref 10–24)
CALCIUM SERPL-MCNC: 9.9 MG/DL (ref 8.6–10.2)
CHLORIDE SERPL-SCNC: 100 MMOL/L (ref 96–106)
CHOLEST SERPL-MCNC: 126 MG/DL (ref 100–199)
CO2 SERPL-SCNC: 23 MMOL/L (ref 20–29)
CREAT SERPL-MCNC: 1.69 MG/DL (ref 0.76–1.27)
EST. AVERAGE GLUCOSE BLD GHB EST-MCNC: 157 MG/DL
GLOBULIN SER CALC-MCNC: 3.1 G/DL (ref 1.5–4.5)
GLUCOSE SERPL-MCNC: 126 MG/DL (ref 65–99)
HBA1C MFR BLD: 7.1 % (ref 4.8–5.6)
HDLC SERPL-MCNC: 30 MG/DL
INTERPRETATION, 910389: NORMAL
INTERPRETATION: NORMAL
LDLC SERPL CALC-MCNC: 71 MG/DL (ref 0–99)
Lab: NORMAL
PDF IMAGE, 910387: NORMAL
POTASSIUM SERPL-SCNC: 4.3 MMOL/L (ref 3.5–5.2)
PROT SERPL-MCNC: 7.4 G/DL (ref 6–8.5)
SODIUM SERPL-SCNC: 138 MMOL/L (ref 134–144)
TRIGL SERPL-MCNC: 126 MG/DL (ref 0–149)
VLDLC SERPL CALC-MCNC: 25 MG/DL (ref 5–40)

## 2020-08-14 ENCOUNTER — VIRTUAL VISIT (OUTPATIENT)
Dept: ENDOCRINOLOGY | Age: 60
End: 2020-08-14
Payer: COMMERCIAL

## 2020-08-14 DIAGNOSIS — E78.5 HYPERLIPIDEMIA WITH TARGET LDL LESS THAN 100: ICD-10-CM

## 2020-08-14 DIAGNOSIS — I10 ESSENTIAL HYPERTENSION, BENIGN: ICD-10-CM

## 2020-08-14 PROCEDURE — 99214 OFFICE O/P EST MOD 30 MIN: CPT | Performed by: INTERNAL MEDICINE

## 2020-08-14 RX ORDER — INSULIN GLARGINE 300 U/ML
INJECTION, SOLUTION SUBCUTANEOUS
Qty: 4.5 ML | Refills: 11
Start: 2020-08-14 | End: 2021-02-12

## 2020-08-14 NOTE — PATIENT INSTRUCTIONS
1) Please decrease your toujeo to 26 units daily to avoid any lows overnight. 2) Your A1c is much better at 7.1%. Keep up the good work. 3) Your creatinine (kidney test) has improved. 4) Your cholesterol is at goal. 
 
5) Please come for a follow up visit on 2/12/21 at 8:50am in our [place] office. 6) I will mail you a lab slip. Please put this in the glove compartment or other safe spot where you keep your medical papers and I will send you a reminder to have your labs drawn prior to next visit.

## 2020-08-14 NOTE — PROGRESS NOTES
Chief Complaint   Patient presents with    Diabetes       **THIS IS A VIRTUAL VISIT VIA A VIDEO SYNCHRONOUS DISCUSSION. PATIENT AGREED TO HAVE THEIR CARE DELIVERED OVER A DOXY. ME VIDEO VISIT IN PLACE OF THEIR REGULARLY SCHEDULED OFFICE VISIT**    History of Present Illness: Maricruz Herrera is a 61 y.o. male here for follow up of diabetes. Compliant with trulicity and toujeo. Has still been working on his diet and has had more lows overnight with some in the 60s. Still dealing with stomach pain and keeping gas and air but doesn't necessarily think it's worse on Monday or Tuesday after his trulicity on Monday. Saw Dr. Rubin Chu on 6/30/20 and he identified that he does have a hernia and also with his history of diverticulitis, it's unclear if some of his symptoms are actually from this. Compliant with BP and lipid regimen. Weight down to 205 from 210 in 2/20. Current Outpatient Medications   Medication Sig    omeprazole (PRILOSEC) 40 mg capsule TAKE 1 CAPSULE BY MOUTH EVERY DAY BEFORE A MEAL    Toujeo SoloStar U-300 Insulin 300 unit/mL (1.5 mL) inpn pen INJECT 28 UNITS UNDER THE SKIN ONCE DAILY    FreeStyle Oliva 14 Day Sensor kit USE AS DIRECTED EVERY 14 DAYS    Insulin Needles, Disposable, 31 gauge x 5/16\" ndle USE FOR INJECTING INSULIN ONCE DAILY    spironolactone (ALDACTONE) 25 mg tablet TAKE 1 TABLET BY MOUTH ONCE A DAY--NEW LOWER DOSE REPLACES PRIOR SCRIPT ON FILE FOR 50 MG DOSE    TRULICITY 8.03 QL/2.0 mL sub-q pen INJECT 0.5ML (0.75MG) UNDER THE SKIN EVERY 7 DAYS    atorvastatin (LIPITOR) 20 mg tablet TAKE 1 TABLET BY ORAL ROUTE EVERY BEDTIME    FREESTYLE OLIVA 14 DAY READER misc Use as directed    amLODIPine (NORVASC) 10 mg tablet Take one tablet by mouth one time daily    lisinopril (PRINIVIL, ZESTRIL) 40 mg tablet TAKE ONE TABLET BY MOUTH ONE TIME DAILY    Cholecalciferol, Vitamin D3, 1,000 unit cap Take 1,000 Units by mouth daily.     allopurinol (ZYLOPRIM) 300 mg tablet TAKE ONE TABLET BY MOUTH ONE TIME DAILY     carvedilol (COREG) 6.25 mg tablet Take 1 Tab by mouth two (2) times daily (with meals). No current facility-administered medications for this visit. Allergies   Allergen Reactions    Ciprofloxacin Itching and Swelling    Flagyl [Metronidazole] Itching and Swelling     Review of Systems: PER HPI    Physical Examination:  - GENERAL: NCAT, Appears well nourished   - EYES: EOMI, non-icteric, no proptosis   - Ear/Nose/Throat: NCAT, no visible inflammation or masses   - CARDIOVASCULAR: no cyanosis, no visible JVD   - RESPIRATORY: respiratory effort normal without any distress or labored breathing   - MUSCULOSKELETAL: Normal ROM of neck and upper extremities observed   - SKIN: No rash on face  - NEUROLOGIC:  No facial asymmetry (Cranial nerve 7 motor function), No gaze palsy   - PSYCHIATRIC: Normal affect, Normal insight and judgement       Data Reviewed:   Component      Latest Ref Rng & Units 8/10/2020 8/10/2020 8/10/2020          10:14 AM 10:14 AM 10:14 AM   Glucose      65 - 99 mg/dL  126 (H)    BUN      8 - 27 mg/dL  22    Creatinine      0.76 - 1.27 mg/dL  1.69 (H)    GFR est non-AA      >59 mL/min/1.73  43 (L)    GFR est AA      >59 mL/min/1.73  50 (L)    BUN/Creatinine ratio      10 - 24  13    Sodium      134 - 144 mmol/L  138    Potassium      3.5 - 5.2 mmol/L  4.3    Chloride      96 - 106 mmol/L  100    CO2      20 - 29 mmol/L  23    Calcium      8.6 - 10.2 mg/dL  9.9    Protein, total      6.0 - 8.5 g/dL  7.4    Albumin      3.8 - 4.9 g/dL  4.3    GLOBULIN, TOTAL      1.5 - 4.5 g/dL  3.1    A-G Ratio      1.2 - 2.2  1.4    Bilirubin, total      0.0 - 1.2 mg/dL  1.0    Alk.  phosphatase      39 - 117 IU/L  82    AST      0 - 40 IU/L  35    ALT      0 - 44 IU/L  42    Cholesterol, total      100 - 199 mg/dL 126     Triglyceride      0 - 149 mg/dL 126     HDL Cholesterol      >39 mg/dL 30 (L)     VLDL, calculated      5 - 40 mg/dL 25     LDL, calculated      0 - 99 mg/dL 71     Hemoglobin A1c, (calculated)      4.8 - 5.6 %   7.1 (H)   Estimated average glucose      mg/dL   157       Assessment/Plan:     1. Uncontrolled type 2 diabetes mellitus with diabetic nephropathy, with long-term current use of insulin ([facility] [address]): his most recent Hgb A1c was 7.1% in 8/20 down from 7.9% in 5/20 down from 9.9% in 2/20 up from 6.7% in 8/19 down from 9.4% in 3/19 up from 8.3% in 11/18 down 13% in 7/18 up from 10.4% in 2/18 up from 6.5% in 8/17. His A1c is much better and having some overnight hypoglycemia so will decrease toujeo. - decrease toujeo to 26 units daily  - cont trulicity at 4.24 mg weekly  - check bs 3-5 times daily due to fluctuating sugars and hypoglycemia  - cont freestyle juan c 14 day system  - foot exam done 2/20  - optho UTD 11/19--will obtain report  - microalbumin 55.1 8/17, down to 8.1 in 7/18 and 11 in 3/19 and 10 in 2/20  - check A1c and cmp and microalbumin prior to next visit         2. Essential hypertension, benign: his BP was at goal < 140/90  - cont amlodipine 10 mg daily  - cont coreg 6.25 mg daily  - cont lisinopril 40 mg daily  - cont spironolactone 50 mg daily        3. Hyperlipidemia with target LDL less than 100:  in 8/17 and was just started on atorva 20 mg daily and down to 38 in 2/18.  and non- in 7/18 with A1c of 13%. LDL 59 and  in 3/19. Was off atorva in 8/19 and LDL 69,  so restarted and LDL 62 and  in 2/20. LDL 71 and  in 8/20  - cont atorva 20 mg daily  - check lipids prior to next visit          Patient Instructions   1) Please decrease your toujeo to 26 units daily to avoid any lows overnight. 2) Your A1c is much better at 7.1%. Keep up the good work. 3) Your creatinine (kidney test) has improved. 4) Your cholesterol is at goal.    5) Please come for a follow up visit on 2/12/21 at 8:50am in our [place] office. 6) I will mail you a lab slip.   Please put this in the glove compartment or other safe spot where you keep your medical papers and I will send you a reminder to have your labs drawn prior to next visit.          Follow-up and Dispositions    · Return 2/12/21 at 8:50am.             Copy sent to:  Dr. Ramo Solorio

## 2020-08-31 RX ORDER — DULAGLUTIDE 0.75 MG/.5ML
INJECTION, SOLUTION SUBCUTANEOUS
Qty: 6 ML | Refills: 3 | Status: SHIPPED | OUTPATIENT
Start: 2020-08-31 | End: 2021-04-05

## 2020-09-07 RX ORDER — ATORVASTATIN CALCIUM 20 MG/1
TABLET, FILM COATED ORAL
Qty: 90 TAB | Refills: 3 | Status: SHIPPED | OUTPATIENT
Start: 2020-09-07 | End: 2021-06-27 | Stop reason: SDUPTHER

## 2021-01-26 DIAGNOSIS — E78.5 HYPERLIPIDEMIA WITH TARGET LDL LESS THAN 100: ICD-10-CM

## 2021-01-26 DIAGNOSIS — I10 ESSENTIAL HYPERTENSION, BENIGN: ICD-10-CM

## 2021-02-11 LAB
ALBUMIN SERPL-MCNC: 4.5 G/DL (ref 3.8–4.8)
ALBUMIN/CREAT UR: 12 MG/G CREAT (ref 0–29)
ALBUMIN/GLOB SERPL: 1.4 {RATIO} (ref 1.2–2.2)
ALP SERPL-CCNC: 103 IU/L (ref 39–117)
ALT SERPL-CCNC: 46 IU/L (ref 0–44)
AST SERPL-CCNC: 34 IU/L (ref 0–40)
BILIRUB SERPL-MCNC: 0.9 MG/DL (ref 0–1.2)
BUN SERPL-MCNC: 19 MG/DL (ref 8–27)
BUN/CREAT SERPL: 10 (ref 10–24)
CALCIUM SERPL-MCNC: 9.7 MG/DL (ref 8.6–10.2)
CHLORIDE SERPL-SCNC: 99 MMOL/L (ref 96–106)
CHOLEST SERPL-MCNC: 120 MG/DL (ref 100–199)
CO2 SERPL-SCNC: 23 MMOL/L (ref 20–29)
CREAT SERPL-MCNC: 1.91 MG/DL (ref 0.76–1.27)
CREAT UR-MCNC: 120.5 MG/DL
EST. AVERAGE GLUCOSE BLD GHB EST-MCNC: 229 MG/DL
GLOBULIN SER CALC-MCNC: 3.3 G/DL (ref 1.5–4.5)
GLUCOSE SERPL-MCNC: 186 MG/DL (ref 65–99)
HBA1C MFR BLD: 9.6 % (ref 4.8–5.6)
HDLC SERPL-MCNC: 29 MG/DL
INTERPRETATION, 910389: NORMAL
INTERPRETATION: NORMAL
LDLC SERPL CALC-MCNC: 60 MG/DL (ref 0–99)
Lab: NORMAL
MICROALBUMIN UR-MCNC: 14.9 UG/ML
PDF IMAGE, 910387: NORMAL
POTASSIUM SERPL-SCNC: 4.2 MMOL/L (ref 3.5–5.2)
PROT SERPL-MCNC: 7.8 G/DL (ref 6–8.5)
SODIUM SERPL-SCNC: 138 MMOL/L (ref 134–144)
TRIGL SERPL-MCNC: 184 MG/DL (ref 0–149)
VLDLC SERPL CALC-MCNC: 31 MG/DL (ref 5–40)

## 2021-02-12 ENCOUNTER — VIRTUAL VISIT (OUTPATIENT)
Dept: ENDOCRINOLOGY | Age: 61
End: 2021-02-12
Payer: COMMERCIAL

## 2021-02-12 DIAGNOSIS — I10 ESSENTIAL HYPERTENSION, BENIGN: ICD-10-CM

## 2021-02-12 DIAGNOSIS — E78.5 HYPERLIPIDEMIA WITH TARGET LDL LESS THAN 100: ICD-10-CM

## 2021-02-12 PROCEDURE — 99214 OFFICE O/P EST MOD 30 MIN: CPT | Performed by: INTERNAL MEDICINE

## 2021-02-12 RX ORDER — INSULIN GLARGINE 300 U/ML
INJECTION, SOLUTION SUBCUTANEOUS
Qty: 4.5 ML | Refills: 11
Start: 2021-02-12 | End: 2021-06-08

## 2021-02-12 NOTE — PATIENT INSTRUCTIONS
1) Your Hemoglobin A1c (3 month test of blood sugar) mk from 7.1% to 9.6% due to being off track with diet and exercise and taking too low a dose of toujeo at times. Stay on 30 units daily and as long as your sugars are in the  range in the morning, this is a good dose. If you have 2 or more readings under 90 in a week, then decrease your insulin by 2 units every week. 2) Your creatinine (kidney test) was slightly higher due to mild dehydration. Drink at least 4 8oz glasses of water everyday to stay hydrated to prevent your creatinine level from going higher. 3) Your triglycerides (short term fats) were higher and HDL (good cholesterol) was slightly lower and your ALT (liver test) was slightly high due to uncontrolled diabetes causing increased fat in the liver. 4) You can try getting back on omeprazole (prilosec) 40 mg daily after dinner to help with your GI symptoms. 5) Please come for a follow up visit on 8/20/21 at 9:10am in our [address] [facility] [address] office. 6) I put an order directly into the [employer] system to repeat your labs in 3 months and in the 1-2 weeks prior to your next visit so just ask for the order under my name and you will receive a courtesy reminder through [employer] to have these drawn.

## 2021-02-12 NOTE — PROGRESS NOTES
Chief Complaint   Patient presents with   [facility] Diabetes     [phone] doxy    Other     pcp and pharmacy confirmed       **THIS IS A VIRTUAL VISIT VIA A VIDEO SYNCHRONOUS DISCUSSION. PATIENT AGREED TO HAVE THEIR CARE DELIVERED OVER A DOXY. ME VIDEO VISIT IN PLACE OF THEIR REGULARLY SCHEDULED OFFICE VISIT**    History of Present Illness: Lonnie Jean is a 64 y.o. male here for follow up of diabetes. Has been off the omeprazole for several months and has noticed more belching and gas off this. His diet hasn't been as good during COVID. Has seen higher readings at home up to the mid 200s but has had a few down to the 60s when he doesn't eat as much. Has increased his toujeo back to 30 units daily about a week ago and fasting sugar was 198 this morning. May have accidentally been taking too little insulin at times down to 18 units and this could have caused some of the highs. Still getting the trulicity every week. Compliant with BP and lipid regimen. Has not been as active during [place] and not riding his bike currently. Weight is stable at 205. Has not been as good about drinking water and has noticed a little more pain over his kidneys.        Current Outpatient Medications   Medication Sig    Toujeo SoloStar U-300 Insulin 300 unit/mL (1.5 mL) inpn pen INJECT 30 UNITS UNDER THE SKIN ONCE DAILY--Dose change 2/12/21--updated med list--did not send prescription to the pharmacy    atorvastatin (LIPITOR) 20 mg tablet TAKE 1 TABLET BY MOUTH EVERYDAY AT BEDTIME    dulaglutide (Trulicity) 3.99 SN/2.2 mL sub-q pen INJECT 0.5ML (0.75MG) UNDER THE SKIN EVERY 7 DAYS    FreeStyle Oliva 14 Day Sensor kit USE AS DIRECTED EVERY 14 DAYS    Insulin Needles, Disposable, 31 gauge x 5/16\" ndle USE FOR INJECTING INSULIN ONCE DAILY    spironolactone (ALDACTONE) 25 mg tablet TAKE 1 TABLET BY MOUTH ONCE A DAY--NEW LOWER DOSE REPLACES PRIOR SCRIPT ON FILE FOR 50 MG DOSE    FREESTYLE OLIVA 14 DAY READER misc Use as directed  amLODIPine (NORVASC) 10 mg tablet Take one tablet by mouth one time daily    lisinopril (PRINIVIL, ZESTRIL) 40 mg tablet TAKE ONE TABLET BY MOUTH ONE TIME DAILY    Cholecalciferol, Vitamin D3, 1,000 unit cap Take 1,000 Units by mouth daily.  allopurinol (ZYLOPRIM) 300 mg tablet TAKE ONE TABLET BY MOUTH ONE TIME DAILY     carvedilol (COREG) 6.25 mg tablet Take 1 Tab by mouth two (2) times daily (with meals). No current facility-administered medications for this visit.       Allergies   Allergen Reactions    Ciprofloxacin Itching and Swelling    Flagyl [Metronidazole] Itching and Swelling     Review of Systems: PER HPI    Physical Examination:  - GENERAL: NCAT, Appears well nourished   - EYES: EOMI, non-icteric, no proptosis   - Ear/Nose/Throat: NCAT, no visible inflammation or masses   - CARDIOVASCULAR: no cyanosis, no visible JVD   - RESPIRATORY: respiratory effort normal without any distress or labored breathing   - MUSCULOSKELETAL: Normal ROM of neck and upper extremities observed   - SKIN: No rash on face  - NEUROLOGIC:  No facial asymmetry (Cranial nerve 7 motor function), No gaze palsy   - PSYCHIATRIC: Normal affect, Normal insight and judgement       Data Reviewed:   Component      Latest Ref Rng & Units 2/9/2021 2/9/2021 2/9/2021 2/9/2021           9:54 AM  9:54 AM  9:54 AM  9:54 AM   Glucose      65 - 99 mg/dL  186 (H)     BUN      8 - 27 mg/dL  19     Creatinine      0.76 - 1.27 mg/dL  1.91 (H)     GFR est non-AA      >59 mL/min/1.73  37 (L)     GFR est AA      >59 mL/min/1.73  43 (L)     BUN/Creatinine ratio      10 - 24  10     Sodium      134 - 144 mmol/L  138     Potassium      3.5 - 5.2 mmol/L  4.2     Chloride      96 - 106 mmol/L  99     CO2      20 - 29 mmol/L  23     Calcium      8.6 - 10.2 mg/dL  9.7     Protein, total      6.0 - 8.5 g/dL  7.8     Albumin      3.8 - 4.8 g/dL  4.5     GLOBULIN, TOTAL      1.5 - 4.5 g/dL  3.3     A-G Ratio      1.2 - 2.2  1.4     Bilirubin, total 0.0 - 1.2 mg/dL  0.9     Alk. phosphatase      39 - 117 IU/L  103     AST      0 - 40 IU/L  34     ALT      0 - 44 IU/L  46 (H)     Cholesterol, total      100 - 199 mg/dL 120      Triglyceride      0 - 149 mg/dL 184 (H)      HDL Cholesterol      >39 mg/dL 29 (L)      VLDL, calculated      5 - 40 mg/dL 31      LDL, calculated      0 - 99 mg/dL 60      Creatinine, urine      Not Estab. mg/dL    120.5   Microalbumin, urine      Not Estab. ug/mL    14.9   Microalbumin/Creat. Ratio      0 - 29 mg/g creat    12   Hemoglobin A1c, (calculated)      4.8 - 5.6 %   9.6 (H)    Estimated average glucose      mg/dL   229        Assessment/Plan:     1. Uncontrolled type 2 diabetes mellitus with diabetic nephropathy, with long-term current use of insulin ([facility] [address]): his most recent Hgb A1c was 9.6% in 2/21 up from 7.1% in 8/20 down from 7.9% in 5/20 down from 9.9% in 2/20 up from 6.7% in 8/19 down from 9.4% in 3/19 up from 8.3% in 11/18 down 13% in 7/18 up from 10.4% in 2/18 up from 6.5% in 8/17. His A1c is much worse after being off track with diet and exercise. - cont toujeo 30 units daily  - cont trulicity at 0.22 mg weekly  - check bs 3-5 times daily due to fluctuating sugars and hypoglycemia  - cont freestyle juan c 14 day system  - foot exam done 2/20  - optho UTD 11/19--will obtain report  - microalbumin 55.1 8/17, down to 8.1 in 7/18 and normal ever since, last in 2/21  - check A1c and cmp prior to next visit   - check A1c and bmp in 3 months        2. Essential hypertension, benign: his BP was at goal < 140/90  - cont amlodipine 10 mg daily  - cont coreg 6.25 mg daily  - cont lisinopril 40 mg daily  - cont spironolactone 50 mg daily        3. Hyperlipidemia with target LDL less than 100:  in 8/17 and was just started on atorva 20 mg daily and down to 38 in 2/18.  and non- in 7/18 with A1c of 13%. LDL 59 and  in 3/19.   Was off atorva in 8/19 and LDL 69,  so restarted and LDL 62 and  in 2/20.  LDL 71 and  in 8/20. LDL 60 and  in 2/21  - cont atorva 20 mg daily  - check lipids prior to next visit          Patient Instructions   1) Your Hemoglobin A1c (3 month test of blood sugar) mk from 7.1% to 9.6% due to being off track with diet and exercise and taking too low a dose of toujeo at times. Stay on 30 units daily and as long as your sugars are in the  range in the morning, this is a good dose. If you have 2 or more readings under 90 in a week, then decrease your insulin by 2 units every week. 2) Your creatinine (kidney test) was slightly higher due to mild dehydration. Drink at least 4 8oz glasses of water everyday to stay hydrated to prevent your creatinine level from going higher. 3) Your triglycerides (short term fats) were higher and HDL (good cholesterol) was slightly lower and your ALT (liver test) was slightly high due to uncontrolled diabetes causing increased fat in the liver. 4) You can try getting back on omeprazole (prilosec) 40 mg daily after dinner to help with your GI symptoms. 5) Please come for a follow up visit on 8/20/21 at 9:10am in our [place] office. 6) I put an order directly into the [employer] system to repeat your labs in 3 months and in the 1-2 weeks prior to your next visit so just ask for the order under my name and you will receive a courtesy reminder through [employer] to have these drawn.             Follow-up and Dispositions    · Return 8/20/21 at 9:10am.               Copy sent to:  Dr. Nathan Bennett follow up: 5/22/21  Component      Latest Ref Rng & Units 5/18/2021 5/18/2021          10:38 AM 10:38 AM   Glucose      65 - 99 mg/dL  111 (H)   BUN      8 - 27 mg/dL  20   Creatinine      0.76 - 1.27 mg/dL  1.62 (H)   GFR est non-AA      >59 mL/min/1.73  45 (L)   GFR est AA      >59 mL/min/1.73  52 (L)   BUN/Creatinine ratio      10 - 24  12   Sodium      134 - 144 mmol/L  145 (H)   Potassium      3.5 - 5.2 mmol/L  4.6   Chloride      96 - 106 mmol/L  104   CO2      20 - 29 mmol/L  25   Calcium      8.6 - 10.2 mg/dL  9.8   Hemoglobin A1c, (calculated)      4.8 - 5.6 % 8.0 (H)    Estimated average glucose      mg/dL 183      Sent him the following message through [employer]:  Hemoglobin A1c is a 3 month marker of your diabetes control. Goal is less than 7%. Your has improved from 9.6% to 8% so hopefully next time will be even better. Continue to work on your diet and exercise and take all your medications as directed.  -------------------------------------------------------------------------------------------------------------------  BUN and creatinine are markers of kidney function. Your creatinine has improved from 1.91 to 1.62 which means the lower sugar has allowed your kidneys to feel less dehydrated.

## 2021-03-05 RX ORDER — SPIRONOLACTONE 25 MG/1
TABLET ORAL
Qty: 90 TAB | Refills: 3 | Status: SHIPPED | OUTPATIENT
Start: 2021-03-05 | End: 2022-02-24 | Stop reason: SDUPTHER

## 2021-04-05 RX ORDER — DULAGLUTIDE 0.75 MG/.5ML
INJECTION, SOLUTION SUBCUTANEOUS
Qty: 6 SYRINGE | Refills: 3 | Status: SHIPPED | OUTPATIENT
Start: 2021-04-05 | End: 2022-04-14

## 2021-04-10 RX ORDER — PEN NEEDLE, DIABETIC 30 GX3/16"
NEEDLE, DISPOSABLE MISCELLANEOUS
Qty: 1 PACKAGE | Refills: 13 | Status: SHIPPED | OUTPATIENT
Start: 2021-04-10 | End: 2022-02-24 | Stop reason: SDUPTHER

## 2021-04-13 ENCOUNTER — TELEPHONE (OUTPATIENT)
Dept: ENDOCRINOLOGY | Age: 61
End: 2021-04-13

## 2021-04-13 NOTE — TELEPHONE ENCOUNTER
Patient stated he has one pen left however when he went to call in a refill he was informed he needed a PA. Do you want to pursue it?

## 2021-04-13 NOTE — TELEPHONE ENCOUNTER
----- Message from Donn Patton sent at 4/13/2021  3:12 PM EDT -----  Regarding: Dr Leatha Barakat Message/Vendor Calls    Caller's first and last name: N/A      Reason for call: Pt needs to get approval for his medication Trulicity called into his insurance      Callback required yes/no and why: yes, to confirm approval for his medication Trulicity has been called into his insurance so they can cover the cost      Best contact number(s): [phone]      Details to clarify the request: Pt needs to get approval for his medication Trulicity called into [employer] in order for them to cover the cost      Donn Patton

## 2021-04-17 NOTE — TELEPHONE ENCOUNTER
I tried to submit a prior authorization for his trulicity but received the following reply:     THIS MEMBER [id number] [facility] ISAI Sandra DOES NOT MANAGE PA FOR THESE CLIENTS.    -------------------------------------------------------------------------------------------------------------------  Previously I had to complete a form back in Feb 2018 and fax in manually and received approval through 2/26/21. Can you obtain the form again? Thanks.

## 2021-04-22 RX ORDER — FLASH GLUCOSE SENSOR
KIT MISCELLANEOUS
Qty: 2 KIT | Refills: 11 | Status: SHIPPED | OUTPATIENT
Start: 2021-04-22 | End: 2022-02-24 | Stop reason: SDUPTHER

## 2021-04-29 ENCOUNTER — TELEPHONE (OUTPATIENT)
Dept: ENDOCRINOLOGY | Age: 61
End: 2021-04-29

## 2021-05-12 DIAGNOSIS — E78.5 HYPERLIPIDEMIA WITH TARGET LDL LESS THAN 100: ICD-10-CM

## 2021-05-12 DIAGNOSIS — I10 ESSENTIAL HYPERTENSION, BENIGN: ICD-10-CM

## 2021-05-19 LAB
BUN SERPL-MCNC: 20 MG/DL (ref 8–27)
BUN/CREAT SERPL: 12 (ref 10–24)
CALCIUM SERPL-MCNC: 9.8 MG/DL (ref 8.6–10.2)
CHLORIDE SERPL-SCNC: 104 MMOL/L (ref 96–106)
CO2 SERPL-SCNC: 25 MMOL/L (ref 20–29)
CREAT SERPL-MCNC: 1.62 MG/DL (ref 0.76–1.27)
EST. AVERAGE GLUCOSE BLD GHB EST-MCNC: 183 MG/DL
GLUCOSE SERPL-MCNC: 111 MG/DL (ref 65–99)
HBA1C MFR BLD: 8 % (ref 4.8–5.6)
INTERPRETATION: NORMAL
POTASSIUM SERPL-SCNC: 4.6 MMOL/L (ref 3.5–5.2)
SODIUM SERPL-SCNC: 145 MMOL/L (ref 134–144)

## 2021-05-22 NOTE — PROGRESS NOTES
Hemoglobin A1c is a 3 month marker of your diabetes control. Goal is less than 7%. Your has improved from 9.6% to 8% so hopefully next time will be even better. Continue to work on your diet and exercise and take all your medications as directed.  -------------------------------------------------------------------------------------------------------------------  BUN and creatinine are markers of kidney function. Your creatinine has improved from 1.91 to 1.62 which means the lower sugar has allowed your kidneys to feel less dehydrated.

## 2021-06-08 RX ORDER — INSULIN GLARGINE 300 U/ML
INJECTION, SOLUTION SUBCUTANEOUS
Qty: 4.5 ML | Refills: 11 | Status: SHIPPED | OUTPATIENT
Start: 2021-06-08 | End: 2021-08-20

## 2021-06-27 RX ORDER — ATORVASTATIN CALCIUM 20 MG/1
TABLET, FILM COATED ORAL
Qty: 90 TABLET | Refills: 3 | Status: SHIPPED | OUTPATIENT
Start: 2021-06-27 | End: 2022-02-24 | Stop reason: SDUPTHER

## 2021-08-06 DIAGNOSIS — E78.5 HYPERLIPIDEMIA WITH TARGET LDL LESS THAN 100: ICD-10-CM

## 2021-08-06 DIAGNOSIS — I10 ESSENTIAL HYPERTENSION, BENIGN: ICD-10-CM

## 2021-08-10 LAB
ALBUMIN SERPL-MCNC: 4.2 G/DL (ref 3.8–4.8)
ALBUMIN/GLOB SERPL: 1.3 {RATIO} (ref 1.2–2.2)
ALP SERPL-CCNC: 91 IU/L (ref 48–121)
ALT SERPL-CCNC: 36 IU/L (ref 0–44)
AST SERPL-CCNC: 31 IU/L (ref 0–40)
BILIRUB SERPL-MCNC: 0.8 MG/DL (ref 0–1.2)
BUN SERPL-MCNC: 25 MG/DL (ref 8–27)
BUN/CREAT SERPL: 14 (ref 10–24)
CALCIUM SERPL-MCNC: 9.3 MG/DL (ref 8.6–10.2)
CHLORIDE SERPL-SCNC: 103 MMOL/L (ref 96–106)
CHOLEST SERPL-MCNC: 134 MG/DL (ref 100–199)
CO2 SERPL-SCNC: 25 MMOL/L (ref 20–29)
CREAT SERPL-MCNC: 1.82 MG/DL (ref 0.76–1.27)
EST. AVERAGE GLUCOSE BLD GHB EST-MCNC: 169 MG/DL
GLOBULIN SER CALC-MCNC: 3.2 G/DL (ref 1.5–4.5)
GLUCOSE SERPL-MCNC: 146 MG/DL (ref 65–99)
HBA1C MFR BLD: 7.5 % (ref 4.8–5.6)
HDLC SERPL-MCNC: 26 MG/DL
IMP & REVIEW OF LAB RESULTS: NORMAL
INTERPRETATION: NORMAL
LDLC SERPL CALC-MCNC: 71 MG/DL (ref 0–99)
POTASSIUM SERPL-SCNC: 4.3 MMOL/L (ref 3.5–5.2)
PROT SERPL-MCNC: 7.4 G/DL (ref 6–8.5)
SODIUM SERPL-SCNC: 140 MMOL/L (ref 134–144)
TRIGL SERPL-MCNC: 219 MG/DL (ref 0–149)
VLDLC SERPL CALC-MCNC: 37 MG/DL (ref 5–40)

## 2021-08-20 ENCOUNTER — OFFICE VISIT (OUTPATIENT)
Dept: ENDOCRINOLOGY | Age: 61
End: 2021-08-20
Payer: COMMERCIAL

## 2021-08-20 VITALS
HEIGHT: 69 IN | WEIGHT: 208.3 LBS | DIASTOLIC BLOOD PRESSURE: 76 MMHG | SYSTOLIC BLOOD PRESSURE: 136 MMHG | HEART RATE: 74 BPM | BODY MASS INDEX: 30.85 KG/M2

## 2021-08-20 DIAGNOSIS — E78.5 HYPERLIPIDEMIA WITH TARGET LDL LESS THAN 100: ICD-10-CM

## 2021-08-20 DIAGNOSIS — I10 ESSENTIAL HYPERTENSION, BENIGN: ICD-10-CM

## 2021-08-20 PROCEDURE — 3051F HG A1C>EQUAL 7.0%<8.0%: CPT | Performed by: INTERNAL MEDICINE

## 2021-08-20 PROCEDURE — 99214 OFFICE O/P EST MOD 30 MIN: CPT | Performed by: INTERNAL MEDICINE

## 2021-08-20 RX ORDER — SUCRALFATE 1 G/1
TABLET ORAL
COMMUNITY
Start: 2021-07-24

## 2021-08-20 RX ORDER — INSULIN GLARGINE 300 U/ML
INJECTION, SOLUTION SUBCUTANEOUS
Qty: 4.5 ML | Refills: 11
Start: 2021-08-20 | End: 2022-02-21

## 2021-08-20 RX ORDER — OMEPRAZOLE 40 MG/1
CAPSULE, DELAYED RELEASE ORAL
COMMUNITY
Start: 2021-06-08

## 2021-08-20 NOTE — PATIENT INSTRUCTIONS
1) Your Hemoglobin A1c (3 month test of blood sugar) has improved from 9.6% to 8% in May to 7.5% and goal is under 7%. 2) I think your A1c is still above goal due to the high spikes in the 200-250 range when you have Chick-tyrone-a with sweet tea so try to avoid sweet tea as best as possible to keep your sugars from spiking over 180 after you eat. 3) To be safe and avoid low sugars under 90, decrease your toujeo to 28 units in the morning. If you have 2 or more readings under 90 in a week, then decrease your insulin by 2 more units every week. 4) Your blood pressure is controlled. 5) Your cholesterol was slightly higher than last time. 6) Your creatinine (kidney test) was stable.

## 2021-08-20 NOTE — PROGRESS NOTES
Chief Complaint   Patient presents with    Diabetes     pcp and pharmacy confirmed    Other     patient has had eye exam but cannot remember his name      History of Present Illness: Maira Zamora is a 64 y.o. male here for follow up of diabetes. Has remained on once weekly trulicity on Mondays and 30 units of toujeo every morning. Has still been dealing with stomach pain and is seeing a GI doctor but can't recall their name and is currently on omeprazole and carafate and this is helping somewhat. Is under a lot of stress with his job and is sometimes working 12-15 hours a day as a  for [employer] and has to travel down to [place]. Still using the [facility] and has evidence of sugars under 80 frequently when he is not eating but then can spike in the 200-250 range after breakfast which is often from [address] with chicken minis and a sweet tea. Compliant with BP and lipid regimen. Current Outpatient Medications   Medication Sig    atorvastatin (LIPITOR) 20 mg tablet TAKE 1 TABLET BY MOUTH EVERYDAY AT BEDTIME    Toujeo SoloStar U-300 Insulin 300 unit/mL (1.5 mL) inpn pen INJECT 30 UNITS UNDER THE SKIN ONCE DAILY    FreeStyle Oliva 14 Day Sensor kit USE AS DIRECTED EVERY 14 DAYS    Insulin Needles, Disposable, (BD Ultra-Fine Short Pen Needle) 31 gauge x 5/16\" ndle USE FOR INJECTING INSULIN ONCE DAILY    Trulicity 7.32 GK/4.7 mL sub-q pen INJECT 0.5ML (0.75MG) UNDER THE SKIN EVERY 7 DAYS    spironolactone (ALDACTONE) 25 mg tablet TAKE 1 TABLET BY MOUTH ONCE A DAY--NEW LOWER DOSE REPLACES PRIOR SCRIPT ON FILE FOR 50 MG DOSE    FREESTYLE OLIVA 14 DAY READER misc Use as directed    amLODIPine (NORVASC) 10 mg tablet Take one tablet by mouth one time daily    lisinopril (PRINIVIL, ZESTRIL) 40 mg tablet TAKE ONE TABLET BY MOUTH ONE TIME DAILY    Cholecalciferol, Vitamin D3, 1,000 unit cap Take 1,000 Units by mouth daily.     allopurinol (ZYLOPRIM) 300 mg tablet TAKE ONE TABLET BY MOUTH ONE TIME DAILY     carvedilol (COREG) 6.25 mg tablet Take 1 Tab by mouth two (2) times daily (with meals).  omeprazole (PRILOSEC) 40 mg capsule TAKE 1 CAPSULE BY MOUTH EVERY DAY BEFORE A MEAL    sucralfate (CARAFATE) 1 gram tablet TAKE 1 TABLET BY MOUTH TWO TIMES A DAY     No current facility-administered medications for this visit. Allergies   Allergen Reactions    Ciprofloxacin Itching and Swelling    Flagyl [Metronidazole] Itching and Swelling     Review of Systems: PER HPI    Physical Examination:  Blood pressure 136/76, pulse 74, height 5' 9\" (1.753 m), weight 208 lb 4.8 oz (94.5 kg). - General: pleasant, no distress, good eye contact   - Neck: no carotid bruits  - Cardiovascular: regular, normal rate, nl s1 and s2, no m/r/g,   - Respiratory: clear bilaterally  - Integumentary: no edema,   - Psychiatric: normal mood and affect    Diabetic foot exam:     Left Foot:   Visual Exam: callous - mild   Pulse DP: 2+ (normal)   Filament test: reduced sensation    Vibratory sensation: diminished      Right Foot:   Visual Exam: callous - mild   Pulse DP: 2+ (normal)   Filament test: reduced sensation    Vibratory sensation: diminished        Data Reviewed:   Component      Latest Ref Rng & Units 8/9/2021 8/9/2021 8/9/2021           2:42 PM  2:42 PM  2:42 PM   Glucose      65 - 99 mg/dL  146 (H)    BUN      8 - 27 mg/dL  25    Creatinine      0.76 - 1.27 mg/dL  1.82 (H)    GFR est non-AA      >59 mL/min/1.73  39 (L)    GFR est AA      >59 mL/min/1.73  45 (L)    BUN/Creatinine ratio      10 - 24  14    Sodium      134 - 144 mmol/L  140    Potassium      3.5 - 5.2 mmol/L  4.3    Chloride      96 - 106 mmol/L  103    CO2      20 - 29 mmol/L  25    Calcium      8.6 - 10.2 mg/dL  9.3    Protein, total      6.0 - 8.5 g/dL  7.4    Albumin      3.8 - 4.8 g/dL  4.2    GLOBULIN, TOTAL      1.5 - 4.5 g/dL  3.2    A-G Ratio      1.2 - 2.2  1.3    Bilirubin, total      0.0 - 1.2 mg/dL  0.8    Alk.  phosphatase      48 - 121 IU/L  91 AST      0 - 40 IU/L  31    ALT      0 - 44 IU/L  36    Cholesterol, total      100 - 199 mg/dL 134     Triglyceride      0 - 149 mg/dL 219 (H)     HDL Cholesterol      >39 mg/dL 26 (L)     VLDL, calculated      5 - 40 mg/dL 37     LDL, calculated      0 - 99 mg/dL 71     Hemoglobin A1c, (calculated)      4.8 - 5.6 %   7.5 (H)   Estimated average glucose      mg/dL   169       Assessment/Plan:     1. Uncontrolled type 2 diabetes mellitus with diabetic nephropathy, with long-term current use of insulin ([facility] [address]): his most recent Hgb A1c was 7.5% in 8/21 down from 8% in 5/21 down from 9.6% in 2/21 up from 7.1% in 8/20 down from 7.9% in 5/20 down from 9.9% in 2/20 up from 6.7% in 8/19 down from 9.4% in 3/19 up from 8.3% in 11/18 down 13% in 7/18 up from 10.4% in 2/18 up from 6.5% in 8/17. His A1c is much better but having frequent hypoglycemia so will decrease his toujeo. - decrease toujeo to 28 units daily  - cont trulicity at 4.66 mg weekly  - check bs 3-5 times daily due to fluctuating sugars and hypoglycemia  - cont freestyle juan c 14 day system  - foot exam done 8/21  - optho UTD 11/19--will obtain report  - microalbumin 55.1 8/17, down to 8.1 in 7/18 and normal ever since, last in 2/21  - check A1c and cmp and microalbumin prior to next visit         2. Essential hypertension, benign: his BP was at goal < 140/90  - cont amlodipine 10 mg daily  - cont coreg 6.25 mg daily  - cont lisinopril 40 mg daily  - cont spironolactone 50 mg daily        3. Hyperlipidemia with target LDL less than 100:  in 8/17 and was just started on atorva 20 mg daily and down to 38 in 2/18.  and non- in 7/18 with A1c of 13%. LDL 59 and  in 3/19. Was off atorva in 8/19 and LDL 69,  so restarted and LDL 62 and  in 2/20. LDL 71 and  in 8/20. LDL 60 and  in 2/21.   LDL 71 and  in 8/21.  - cont atorva 20 mg daily  - check lipids prior to next visit          Patient Instructions   1) Your Hemoglobin A1c (3 month test of blood sugar) has improved from 9.6% to 8% in May to 7.5% and goal is under 7%. 2) I think your A1c is still above goal due to the high spikes in the 200-250 range when you have Chick-tyrone-a with sweet tea so try to avoid sweet tea as best as possible to keep your sugars from spiking over 180 after you eat. 3) To be safe and avoid low sugars under 90, decrease your toujeo to 28 units in the morning. If you have 2 or more readings under 90 in a week, then decrease your insulin by 2 more units every week. 4) Your blood pressure is controlled. 5) Your cholesterol was slightly higher than last time. 6) Your creatinine (kidney test) was stable. Follow-up and Dispositions    · Return in about 6 months (around 2/20/2022).                Copy sent to:  Dr. Latrice Kuo

## 2022-02-05 DIAGNOSIS — I10 ESSENTIAL HYPERTENSION, BENIGN: ICD-10-CM

## 2022-02-05 DIAGNOSIS — E78.5 HYPERLIPIDEMIA WITH TARGET LDL LESS THAN 100: ICD-10-CM

## 2022-02-19 LAB
ALBUMIN SERPL-MCNC: 4.3 G/DL (ref 3.8–4.8)
ALBUMIN/CREAT UR: 6 MG/G CREAT (ref 0–29)
ALBUMIN/GLOB SERPL: 1.3 {RATIO} (ref 1.2–2.2)
ALP SERPL-CCNC: 89 IU/L (ref 44–121)
ALT SERPL-CCNC: 41 IU/L (ref 0–44)
AST SERPL-CCNC: 38 IU/L (ref 0–40)
BILIRUB SERPL-MCNC: 0.9 MG/DL (ref 0–1.2)
BUN SERPL-MCNC: 19 MG/DL (ref 8–27)
BUN/CREAT SERPL: 11 (ref 10–24)
CALCIUM SERPL-MCNC: 10 MG/DL (ref 8.6–10.2)
CHLORIDE SERPL-SCNC: 104 MMOL/L (ref 96–106)
CHOLEST SERPL-MCNC: 142 MG/DL (ref 100–199)
CO2 SERPL-SCNC: 22 MMOL/L (ref 20–29)
CREAT SERPL-MCNC: 1.67 MG/DL (ref 0.76–1.27)
CREAT UR-MCNC: 219.6 MG/DL
EST. AVERAGE GLUCOSE BLD GHB EST-MCNC: 217 MG/DL
GLOBULIN SER CALC-MCNC: 3.4 G/DL (ref 1.5–4.5)
GLUCOSE SERPL-MCNC: 150 MG/DL (ref 65–99)
HBA1C MFR BLD: 9.2 % (ref 4.8–5.6)
HDLC SERPL-MCNC: 29 MG/DL
IMP & REVIEW OF LAB RESULTS: NORMAL
INTERPRETATION: NORMAL
LDLC SERPL CALC-MCNC: 84 MG/DL (ref 0–99)
MICROALBUMIN UR-MCNC: 13.7 UG/ML
POTASSIUM SERPL-SCNC: 4.3 MMOL/L (ref 3.5–5.2)
PROT SERPL-MCNC: 7.7 G/DL (ref 6–8.5)
SODIUM SERPL-SCNC: 142 MMOL/L (ref 134–144)
TRIGL SERPL-MCNC: 164 MG/DL (ref 0–149)
VLDLC SERPL CALC-MCNC: 29 MG/DL (ref 5–40)

## 2022-02-21 ENCOUNTER — OFFICE VISIT (OUTPATIENT)
Dept: ENDOCRINOLOGY | Age: 62
End: 2022-02-21
Payer: COMMERCIAL

## 2022-02-21 VITALS
WEIGHT: 218.2 LBS | SYSTOLIC BLOOD PRESSURE: 118 MMHG | HEART RATE: 73 BPM | BODY MASS INDEX: 32.32 KG/M2 | DIASTOLIC BLOOD PRESSURE: 73 MMHG | HEIGHT: 69 IN

## 2022-02-21 DIAGNOSIS — I10 ESSENTIAL HYPERTENSION, BENIGN: ICD-10-CM

## 2022-02-21 DIAGNOSIS — E78.5 HYPERLIPIDEMIA WITH TARGET LDL LESS THAN 100: ICD-10-CM

## 2022-02-21 PROCEDURE — 95251 CONT GLUC MNTR ANALYSIS I&R: CPT | Performed by: INTERNAL MEDICINE

## 2022-02-21 PROCEDURE — 99214 OFFICE O/P EST MOD 30 MIN: CPT | Performed by: INTERNAL MEDICINE

## 2022-02-21 RX ORDER — INSULIN GLARGINE 300 U/ML
INJECTION, SOLUTION SUBCUTANEOUS
Qty: 4.5 ML | Refills: 11
Start: 2022-02-21 | End: 2022-07-25

## 2022-02-21 NOTE — PATIENT INSTRUCTIONS
1) Try not to eat after 8pm as previously this helped with GI symptoms and helped keep your sugar in the morning lower in the  range. 2) Stay on toujeo 30 units but f you have 2 or more readings under 90 in a week, then decrease your insulin by 2 units every week. 3) Your creatinine (kidney test) is slightly better than last time and your urine protein is normal and your triglycerides (short term fats) are lower and your HDL (good cholesterol) is higher than last time. 4) Your blood pressure is controlled. 5) I put an order directly into the [employer] system to repeat your labs in 3 months and in the 1-2 weeks prior to your next visit so just ask for the order under my name and you will receive a courtesy reminder through [employer] to have these drawn. 6) Do your best to avoid all sweetened beverages and stick to water, unsweet tea, diet soda, or crystal light as options instead. Don't drink more than 2 of the 12oz artificially sweetened drinks per day as these can increase hunger and make it more difficult to lose weight.

## 2022-02-21 NOTE — PROGRESS NOTES
Chief Complaint   Patient presents with    Diabetes     pcp and pharmacy confirmed    Other     eye exam is due     History of Present Illness: Russel Anne is a 58 y.o. male here for follow up of diabetes. Weight up 10 lbs since last visit in 8/21. Still under the care of the GI doctor and has had continued stomach pain and has had an EGD and colonoscopy that were normal.  Has been eating after 8pm and sometimes after midnight and this can spike his sugars up over 200 frequently. Review of his Hernandez shows he has not had any low sugars but 90 day avg is 222. He increased his toujeo back to 30 units about 10 days ago and this has started bringing his sugars down. Still working 12-15 hour days and drinking sweet tea, juice and soda so will eliminate these. Compliant with BP and lipid regimen.       Current Outpatient Medications   Medication Sig    Toujeo SoloStar U-300 Insulin 300 unit/mL (1.5 mL) inpn pen INJECT 30 UNITS UNDER THE SKIN ONCE DAILY--Dose change 02/21/22--updated med list--did not send prescription to the pharmacy    omeprazole (PRILOSEC) 40 mg capsule TAKE 1 CAPSULE BY MOUTH EVERY DAY BEFORE A MEAL    sucralfate (CARAFATE) 1 gram tablet TAKE 1 TABLET BY MOUTH TWO TIMES A DAY    atorvastatin (LIPITOR) 20 mg tablet TAKE 1 TABLET BY MOUTH EVERYDAY AT BEDTIME    FreeStyle Oliva 14 Day Sensor kit USE AS DIRECTED EVERY 14 DAYS    Insulin Needles, Disposable, (BD Ultra-Fine Short Pen Needle) 31 gauge x 5/16\" ndle USE FOR INJECTING INSULIN ONCE DAILY    Trulicity 0.81 WM/1.3 mL sub-q pen INJECT 0.5ML (0.75MG) UNDER THE SKIN EVERY 7 DAYS    spironolactone (ALDACTONE) 25 mg tablet TAKE 1 TABLET BY MOUTH ONCE A DAY--NEW LOWER DOSE REPLACES PRIOR SCRIPT ON FILE FOR 50 MG DOSE    FREESTYLE OLIVA 14 DAY READER misc Use as directed    amLODIPine (NORVASC) 10 mg tablet Take one tablet by mouth one time daily    lisinopril (PRINIVIL, ZESTRIL) 40 mg tablet TAKE ONE TABLET BY MOUTH ONE TIME DAILY  allopurinol (ZYLOPRIM) 300 mg tablet TAKE ONE TABLET BY MOUTH ONE TIME DAILY     carvedilol (COREG) 6.25 mg tablet Take 1 Tab by mouth two (2) times daily (with meals). (Patient taking differently: Take 6.25 mg by mouth. 2 tabs daily)    Cholecalciferol, Vitamin D3, 1,000 unit cap Take 1,000 Units by mouth daily. No current facility-administered medications for this visit. Allergies   Allergen Reactions    Ciprofloxacin Itching and Swelling    Flagyl [Metronidazole] Itching and Swelling     Review of Systems: PER HPI    Physical Examination:  Blood pressure 118/73, pulse 73, height 5' 9\" (1.753 m), weight 218 lb 3.2 oz (99 kg). - General: pleasant, no distress, good eye contact   - Neck: no carotid bruits  - Cardiovascular: regular, normal rate, nl s1 and s2, no m/r/g,   - Respiratory: clear bilaterally  - Integumentary: no edema,   - Psychiatric: normal mood and affect    Data Reviewed:   Component      Latest Ref Rng & Units 2/18/2022 2/18/2022 2/18/2022 2/18/2022          10:14 AM 10:14 AM 10:14 AM 10:14 AM   Glucose      65 - 99 mg/dL  150 (H)     BUN      8 - 27 mg/dL  19     Creatinine      0.76 - 1.27 mg/dL  1.67 (H)     GFR est non-AA      >59 mL/min/1.73  43 (L)     GFR est AA      >59 mL/min/1.73  50 (L)     BUN/Creatinine ratio      10 - 24  11     Sodium      134 - 144 mmol/L  142     Potassium      3.5 - 5.2 mmol/L  4.3     Chloride      96 - 106 mmol/L  104     CO2      20 - 29 mmol/L  22     Calcium      8.6 - 10.2 mg/dL  10.0     Protein, total      6.0 - 8.5 g/dL  7.7     Albumin      3.8 - 4.8 g/dL  4.3     GLOBULIN, TOTAL      1.5 - 4.5 g/dL  3.4     A-G Ratio      1.2 - 2.2  1.3     Bilirubin, total      0.0 - 1.2 mg/dL  0.9     Alk.  phosphatase      44 - 121 IU/L  89     AST      0 - 40 IU/L  38     ALT      0 - 44 IU/L  41     Cholesterol, total      100 - 199 mg/dL 142      Triglyceride      0 - 149 mg/dL 164 (H)      HDL Cholesterol      >39 mg/dL 29 (L)      VLDL, calculated      5 - 40 mg/dL 29      LDL, calculated      0 - 99 mg/dL 84      Creatinine, urine      Not Estab. mg/dL   219.6    Microalbumin, urine      Not Estab. ug/mL   13.7    Microalbumin/Creat. Ratio      0 - 29 mg/g creat   6    Hemoglobin A1c, (calculated)      4.8 - 5.6 %    9.2 (H)   Estimated average glucose      mg/dL    217       Assessment/Plan:     1. Uncontrolled type 2 diabetes mellitus with diabetic nephropathy, with long-term current use of insulin ([facility]): his most recent Hgb A1c was 9.2% in 2/22 up from 7.5% in 8/21 down from 8% in 5/21 down from 9.6% in 2/21 up from 7.1% in 8/20 down from 7.9% in 5/20 down from 9.9% in 2/20 up from 6.7% in 8/19 down from 9.4% in 3/19 up from 8.3% in 11/18 down 13% in 7/18 up from 10.4% in 2/18 up from 6.5% in 8/17. His A1c is worse due to diet so will work on this before making any changes. May consider SGLT-2 at next visit. - cont toujeo 30 units daily  - cont trulicity 6.45 mg weekly  - check bs 3-5 times daily due to fluctuating sugars and hypoglycemia  - cont freestyle juan c 14 day system  - foot exam done 8/21  - optho UTD 11/19--will obtain report  - microalbumin 55.1 8/17, down to 8.1 in 7/18 and normal ever since, last in 2/22  - check A1c and cmp prior to next visit   - check A1c and bmp in 3 months        2. Essential hypertension, benign: his BP was at goal < 140/90  - cont amlodipine 10 mg daily  - cont coreg 6.25 mg daily  - cont lisinopril 40 mg daily  - cont spironolactone 50 mg daily        3. Hyperlipidemia with target LDL less than 100:  in 8/17 and was just started on atorva 20 mg daily and down to 38 in 2/18.  and non- in 7/18 with A1c of 13%. LDL 59 and  in 3/19. Was off atorva in 8/19 and LDL 69,  so restarted and LDL 62 and  in 2/20. LDL 71 and  in 8/20. LDL 60 and  in 2/21. LDL 71 and  in 8/21.   LDL 84 and  in 2/22.  - cont atorva 20 mg daily  - check lipids prior to next visit          Patient Instructions   1) Try not to eat after 8pm as previously this helped with GI symptoms and helped keep your sugar in the morning lower in the  range. 2) Stay on toujeo 30 units but f you have 2 or more readings under 90 in a week, then decrease your insulin by 2 units every week. 3) Your creatinine (kidney test) is slightly better than last time and your urine protein is normal and your triglycerides (short term fats) are lower and your HDL (good cholesterol) is higher than last time. 4) Your blood pressure is controlled. 5) I put an order directly into the [facility] system to repeat your labs in 3 months and in the 1-2 weeks prior to your next visit so just ask for the order under my name and you will receive a courtesy reminder through [employer] to have these drawn. 6) Do your best to avoid all sweetened beverages and stick to water, unsweet tea, diet soda, or crystal light as options instead. Don't drink more than 2 of the 12oz artificially sweetened drinks per day as these can increase hunger and make it more difficult to lose weight. Follow-up and Dispositions    · Return in about 6 months (around 8/21/2022).                Copy sent to:  Dr. Brian Salgado

## 2022-02-24 RX ORDER — SPIRONOLACTONE 25 MG/1
TABLET ORAL
Qty: 90 TABLET | Refills: 3 | Status: SHIPPED | OUTPATIENT
Start: 2022-02-24

## 2022-02-24 RX ORDER — FLASH GLUCOSE SENSOR
KIT MISCELLANEOUS
Qty: 6 KIT | Refills: 3 | Status: SHIPPED | OUTPATIENT
Start: 2022-02-24

## 2022-02-24 RX ORDER — PEN NEEDLE, DIABETIC 30 GX3/16"
NEEDLE, DISPOSABLE MISCELLANEOUS
Qty: 100 PEN NEEDLE | Refills: 3 | Status: SHIPPED | OUTPATIENT
Start: 2022-02-24

## 2022-02-24 RX ORDER — ATORVASTATIN CALCIUM 20 MG/1
20 TABLET, FILM COATED ORAL
Qty: 90 TABLET | Refills: 3 | Status: SHIPPED | OUTPATIENT
Start: 2022-02-24

## 2022-03-19 PROBLEM — N18.30 CKD (CHRONIC KIDNEY DISEASE) STAGE 3, GFR 30-59 ML/MIN (HCC): Status: ACTIVE | Noted: 2020-02-07

## 2022-04-14 RX ORDER — DULAGLUTIDE 0.75 MG/.5ML
INJECTION, SOLUTION SUBCUTANEOUS
Qty: 6 ML | Refills: 3 | Status: SHIPPED | OUTPATIENT
Start: 2022-04-14 | End: 2022-08-30 | Stop reason: ALTCHOICE

## 2022-05-21 DIAGNOSIS — I10 ESSENTIAL HYPERTENSION, BENIGN: ICD-10-CM

## 2022-05-21 DIAGNOSIS — E78.5 HYPERLIPIDEMIA WITH TARGET LDL LESS THAN 100: ICD-10-CM

## 2022-06-28 ENCOUNTER — OFFICE VISIT (OUTPATIENT)
Dept: HEMATOLOGY | Age: 62
End: 2022-06-28
Payer: COMMERCIAL

## 2022-06-28 VITALS
HEIGHT: 69 IN | TEMPERATURE: 97.3 F | SYSTOLIC BLOOD PRESSURE: 134 MMHG | WEIGHT: 210 LBS | HEART RATE: 77 BPM | OXYGEN SATURATION: 97 % | BODY MASS INDEX: 31.1 KG/M2 | DIASTOLIC BLOOD PRESSURE: 84 MMHG

## 2022-06-28 DIAGNOSIS — R74.8 ELEVATED LIVER ENZYMES: Primary | ICD-10-CM

## 2022-06-28 LAB
ALBUMIN SERPL-MCNC: 3.9 G/DL (ref 3.5–5)
ALBUMIN/GLOB SERPL: 1 {RATIO} (ref 1.1–2.2)
ALP SERPL-CCNC: 83 U/L (ref 45–117)
ALT SERPL-CCNC: 50 U/L (ref 12–78)
ANION GAP SERPL CALC-SCNC: 7 MMOL/L (ref 5–15)
AST SERPL-CCNC: 37 U/L (ref 15–37)
BILIRUB DIRECT SERPL-MCNC: 0.3 MG/DL (ref 0–0.2)
BILIRUB SERPL-MCNC: 0.9 MG/DL (ref 0.2–1)
BUN SERPL-MCNC: 19 MG/DL (ref 6–20)
BUN/CREAT SERPL: 11 (ref 12–20)
CALCIUM SERPL-MCNC: 9.5 MG/DL (ref 8.5–10.1)
CHLORIDE SERPL-SCNC: 108 MMOL/L (ref 97–108)
CO2 SERPL-SCNC: 26 MMOL/L (ref 21–32)
CREAT SERPL-MCNC: 1.69 MG/DL (ref 0.7–1.3)
ERYTHROCYTE [DISTWIDTH] IN BLOOD BY AUTOMATED COUNT: 12.9 % (ref 11.5–14.5)
GLOBULIN SER CALC-MCNC: 3.9 G/DL (ref 2–4)
GLUCOSE SERPL-MCNC: 121 MG/DL (ref 65–100)
HBV SURFACE AB SER QL: NONREACTIVE
HBV SURFACE AB SER-ACNC: 4.5 MIU/ML
HBV SURFACE AG SER QL: 0.17 INDEX
HBV SURFACE AG SER QL: NEGATIVE
HCT VFR BLD AUTO: 40.9 % (ref 36.6–50.3)
HCV AB SERPL QL IA: NONREACTIVE
HGB BLD-MCNC: 13.8 G/DL (ref 12.1–17)
IRON SATN MFR SERPL: 29 % (ref 20–50)
IRON SERPL-MCNC: 74 UG/DL (ref 35–150)
MCH RBC QN AUTO: 31.9 PG (ref 26–34)
MCHC RBC AUTO-ENTMCNC: 33.7 G/DL (ref 30–36.5)
MCV RBC AUTO: 94.7 FL (ref 80–99)
NRBC # BLD: 0 K/UL (ref 0–0.01)
NRBC BLD-RTO: 0 PER 100 WBC
PLATELET # BLD AUTO: 144 K/UL (ref 150–400)
PMV BLD AUTO: 13 FL (ref 8.9–12.9)
POTASSIUM SERPL-SCNC: 3.8 MMOL/L (ref 3.5–5.1)
PROT SERPL-MCNC: 7.8 G/DL (ref 6.4–8.2)
RBC # BLD AUTO: 4.32 M/UL (ref 4.1–5.7)
SODIUM SERPL-SCNC: 141 MMOL/L (ref 136–145)
TIBC SERPL-MCNC: 253 UG/DL (ref 250–450)
WBC # BLD AUTO: 4.6 K/UL (ref 4.1–11.1)

## 2022-06-28 PROCEDURE — 99204 OFFICE O/P NEW MOD 45 MIN: CPT | Performed by: PHYSICIAN ASSISTANT

## 2022-06-28 PROCEDURE — 91200 LIVER ELASTOGRAPHY: CPT | Performed by: PHYSICIAN ASSISTANT

## 2022-06-28 NOTE — PROGRESS NOTES
Identified pt with two pt identifiers(name and ). Reviewed record in preparation for visit and have obtained necessary documentation. Chief Complaint   Patient presents with    Fatty Liver     FS only with Melissa      Vitals:    22 1009   BP: 134/84   Pulse: 77   Temp: 97.3 °F (36.3 °C)   TempSrc: Temporal   SpO2: 97%   Weight: 210 lb (95.3 kg)   Height: 5' 9\" (1.753 m)   PainSc:   3   PainLoc: Back       Health Maintenance Review: Patient reminded of \"due or due soon\" health maintenance. I have asked the patient to contact his/her primary care provider (PCP) for follow-up on his/her health maintenance. Coordination of Care Questionnaire:  :   1) Have you been to an emergency room, urgent care, or hospitalized since your last visit? If yes, where when, and reason for visit? no       2. Have seen or consulted any other health care provider since your last visit? If yes, where when, and reason for visit? NO      Patient is accompanied by self I have received verbal consent from Monica House III to discuss any/all medical information while they are present in the room.

## 2022-06-28 NOTE — PROGRESS NOTES
[facility], [place], [address], [place], [place]      Basia Beltran, PA-C    Dayanara Hernandez, [facility]-BC     Pamela Kennedy, [facility]   CAITIE MelloP-KAM Castro, [facility]       Mike Patterson [address]    at [address], [address] [address]  22.    [phone]    FAX: [address] [place] [address] Avenue    at [address], [address]    [phone]    FAX: [phone]       Patient Care Team:  Alexandro Ivy MD as PCP - General (Family Medicine)  SOCORRO Lugo MD as Physician (Sleep Medicine Physician)  Cliff Ty MD (Nephrology)  Margo Najera MD as Surgeon (General Surgery)  Aylin Kilpatrick MD as Consulting Provider (Endocrinology Physician)      Problem List  Date Reviewed: 2/21/2022          Codes Class Noted    CKD (chronic kidney disease) stage 3, GFR 30-59 ml/min [facility]) ICD-10-CM: N18.30  ICD-9-CM: 585.3  2/7/2020        Uncontrolled type 2 diabetes mellitus with diabetic nephropathy, with long-term current use of insulin ICD-10-CM: [id number]  ICD-9-CM: 250.42, 583.81, V58.67  9/12/2017        Diabetic neuropathy ([facility] ICD-10-CM: E11.40  ICD-9-CM: 250.60, 357.2  4/19/2016        MARIA DEL CARMEN (obstructive sleep apnea) ICD-10-CM: X32.08  ICD-9-CM: 327.23  2/12/2014        Diverticulitis ICD-10-CM: K57.92  ICD-9-CM: 562.11  11/5/2013        Prediabetes ICD-10-CM: R73.03  ICD-9-CM: 790.29  3/5/2013        Hyperlipidemia with target LDL less than 100 ICD-10-CM: E78.5  ICD-9-CM: 272.4  3/5/2013        Cardiomyopathy ([facility] ICD-10-CM: I42.9  ICD-9-CM: 425.4  3/5/2013        Essential hypertension, benign ICD-10-CM: I10  ICD-9-CM: 401.1  4/7/2011        Renal insufficiency ICD-10-CM: N28.9  ICD-9-CM: 593.9  4/7/2011        Gout ICD-10-CM: M10.9  ICD-9-CM: 274.9  4/7/2011              The physicians listed above have asked me to see Haile Keane III in consultation regarding elevated liver enzymes and to perform assessment of fibrosis by means of in-office fibroscan analysis. The patient is a 58 y.o. Black male who was found to have elevation in liver enzymes in disease   in 2018. He states that he was advised at some point that there was concern for fatty liver. This is his first evaluation with GI/hepatology. He denies symptoms of abdominal pain. The patient does not have any symptoms which could be attributed to the liver disorder. He has several features of metabolic syndrome. Review of his chart shows no evaluation for other common etiologies of liver disease. The patient completes all daily activities without any functional limitations. He reports that he has had some weight loss of ~10-15# in the course of the past 3 months or so, intentional.     ALLERGIES:  Allergies   Allergen Reactions    Ciprofloxacin Itching and Swelling    Flagyl [Metronidazole] Itching and Swelling       MEDICATIONS:  Current Outpatient Medications   Medication Sig    Trulicity 9.69 AF/2.3 mL sub-q pen INJECT 0.5ML (0.75MG) UNDER THE SKIN EVERY 7 DAYS    FreeStyle Oliva 14 Day Sensor kit USE AS DIRECTED EVERY 14 DAYS    Insulin Needles, Disposable, (BD Ultra-Fine Short Pen Needle) 31 gauge x 5/16\" ndle USE FOR INJECTING INSULIN ONCE DAILY    atorvastatin (LIPITOR) 20 mg tablet Take 1 Tablet by mouth nightly.     spironolactone (ALDACTONE) 25 mg tablet TAKE 1 TABLET BY MOUTH ONCE A DAY    Toujeo SoloStar U-300 Insulin 300 unit/mL (1.5 mL) inpn pen INJECT 30 UNITS UNDER THE SKIN ONCE DAILY--Dose change 02/21/22--updated med list--did not send prescription to the pharmacy    omeprazole (PRILOSEC) 40 mg capsule TAKE 1 CAPSULE BY MOUTH EVERY DAY BEFORE A MEAL    sucralfate (CARAFATE) 1 gram tablet TAKE 1 TABLET BY MOUTH TWO TIMES A DAY    FREESTYLE PARESH 14 DAY READER [facility] Use as directed    amLODIPine (NORVASC) 10 mg tablet Take one tablet by mouth one time daily    lisinopril (PRINIVIL, ZESTRIL) 40 mg tablet TAKE ONE TABLET BY MOUTH ONE TIME DAILY    Cholecalciferol, Vitamin D3, 1,000 unit cap Take 1,000 Units by mouth daily.  allopurinol (ZYLOPRIM) 300 mg tablet TAKE ONE TABLET BY MOUTH ONE TIME DAILY     carvedilol (COREG) 6.25 mg tablet Take 1 Tab by mouth two (2) times daily (with meals). (Patient taking differently: Take 6.25 mg by mouth. 2 tabs daily)     No current facility-administered medications for this visit. SYSTEM REVIEW NOT RELATED TO LIVER DISEASE OR REVIEWED ABOVE:  Constitution systems: Negative for fever, chills. Modest weight loss. Eyes: Negative for visual changes. ENT: Negative for sore throat, painful swallowing. Respiratory: Negative for cough, hemoptysis, SOB. Cardiology: Negative for chest pain, palpitations. GI:  Negative for constipation or diarrhea. : Negative for urinary frequency, dysuria, hematuria, nocturia. Skin: Negative for rash. Hematology: Negative for easy bruising, blood clots. Musculo-skeletal: Negative for back pain, muscle pain, weakness. Neurologic: Negative for headaches, dizziness, vertigo, memory problems not related to HE. Psychology: Negative for anxiety, depression. increased stress on the job. FAMILY HISTORY:  The father  of complications of DM. The mother has/had the following chronic disease(s): back pain. There is no family history of liver disease. SOCIAL HISTORY:  The patient is . The patient has 2 children. The patient has never used tobacco products. The patient has never consumed significant amounts of alcohol. The patient currently works full time as .     PHYSICAL EXAMINATION:  Visit Vitals  /84 (BP 1 Location: Right arm, BP Patient Position: Sitting, BP Cuff Size: Adult long)   Pulse 77   Temp 97.3 °F (36.3 °C) (Temporal)   Ht 5' 9\" (1.753 m)   Wt 210 lb (95.3 kg)   SpO2 97%   BMI 31.01 kg/m²     General: No acute distress. Skin: No spider angiomata. No jaundice. No palmar erythema. Abdomen: Soft non-tender. Liver size normal to percussion/palpation. Spleen not palpable. No obvious ascites. Extremities: No edema. No muscle wasting. No gross arthritic changes. Neurologic: Alert and oriented. Cranial nerves grossly intact. No asterixis. LABORATORY STUDIES:  Liver [place] [address] Units 2/18/2022 8/9/2021 5/18/2021   WBC 4.1 - 11.1 K/uL      ANC 1.8 - 8.0 K/UL      HGB 12.1 - 17.0 g/dL       - 400 K/uL      AST 0 - 40 IU/L 38 31    ALT 0 - 44 IU/L 41 36    Alk Phos 44 - 121 IU/L 89 91    Bili, Total 0.0 - 1.2 mg/dL 0.9 0.8    Albumin 3.8 - 4.8 g/dL 4.3 4.2    BUN 8 - 27 mg/dL 19 25 20   Creat 0.76 - 1.27 mg/dL 1.67 (H) 1.82 (H) 1.62 (H)   Na 134 - 144 mmol/L 142 140 145 (H)   K 3.5 - 5.2 mmol/L 4.3 4.3 4.6   Cl 96 - 106 mmol/L 104 103 104   CO2 20 - 29 mmol/L 22 25 25   Glucose 65 - 99 mg/dL 150 (H) 146 (H) 111 (H)   Additional lab values drawn at today's office visit are pending at the time of documentation. SEROLOGIES:  Not available or performed. Testing will be performed as indicated. LIVER HISTOLOGY:  6/2022. FibroScan performed at [address] [place] [address]. EkPa was 7.8. IQR/med 7%. . The results suggested a fibrosis level of F1-2. The CAP score suggests there is no significant hepatic steatosis. ENDOSCOPIC PROCEDURES:  Not available or performed    RADIOLOGY:  2019. CT abdomen. Liver unremarkable. OTHER TESTING:  Not available or performed      ASSESSMENT AND PLAN:  Elevated liver enzymes of uncertain etiology with stage 1 portal fibrosis estimated on today's Fibroscan. There is not a strong suggestion of steatosis on today's examination.  This may be due to recent weight losses or limitations of the test.     As I cannot locate assessment of labs in chart to eliminate common etiologies of liver disease, I have ordered studies to investigate viral, inherited and autoimmune liver disease. I will follow-up on these findings as available. Given the suggestion of F1-2 fibrosis, I have suggested repeat Fibroscan in this office in 12 months to assess for any progression over time. He is in agreement with this approach. Assessment of fibrosis was made through performance of fibroscan in the office today. The results of the analysis were shared with the patient in the office at the time of the procedure. A copy of the report was provided to the patient and will be forwarded to the referring medical practice. All questions were answered. The patient expressed a clear understanding of the above. FOLLOW-UP:  All of the issues listed above in the Assessment and Plan were discussed with the patient. All questions were answered. The patient expressed a clear understanding of the above. The patient will follow-up with the referring physician for routine follow-up. We will plan on reporting results of labs to patient and have him return to the office in 12 months for repeat Fibroscan.      Solon Aase, PA-C  Liver [facility] 59, [address] [facility] 22.  [phone]  [address] [place] [address]

## 2022-06-29 LAB
A1AT SERPL-MCNC: 153 MG/DL (ref 101–187)
ANA SER QL: NEGATIVE
HAV AB SER QL IA: NEGATIVE
HBV CORE AB SERPL QL IA: NEGATIVE
SMA IGG SER-ACNC: 8 UNITS (ref 0–19)

## 2022-07-08 NOTE — PROGRESS NOTES
Pt notified of stable liver function/enzymes. No other etiology of liver disease identified. Follow-up as planned for repeat Fibroscan in 12 months and continue with routine PCP follow-up.

## 2022-08-05 DIAGNOSIS — I10 ESSENTIAL HYPERTENSION, BENIGN: ICD-10-CM

## 2022-08-05 DIAGNOSIS — E78.5 HYPERLIPIDEMIA WITH TARGET LDL LESS THAN 100: ICD-10-CM

## 2022-08-30 ENCOUNTER — OFFICE VISIT (OUTPATIENT)
Dept: ENDOCRINOLOGY | Age: 62
End: 2022-08-30
Payer: COMMERCIAL

## 2022-08-30 ENCOUNTER — TELEPHONE (OUTPATIENT)
Dept: ENDOCRINOLOGY | Age: 62
End: 2022-08-30

## 2022-08-30 VITALS
SYSTOLIC BLOOD PRESSURE: 128 MMHG | HEART RATE: 49 BPM | BODY MASS INDEX: 30.66 KG/M2 | DIASTOLIC BLOOD PRESSURE: 78 MMHG | HEIGHT: 69 IN | WEIGHT: 207 LBS

## 2022-08-30 DIAGNOSIS — Z79.4 TYPE 2 DIABETES MELLITUS WITH HYPERGLYCEMIA, WITH LONG-TERM CURRENT USE OF INSULIN (HCC): Primary | ICD-10-CM

## 2022-08-30 DIAGNOSIS — E78.5 HYPERLIPIDEMIA WITH TARGET LDL LESS THAN 100: ICD-10-CM

## 2022-08-30 DIAGNOSIS — E11.65 TYPE 2 DIABETES MELLITUS WITH HYPERGLYCEMIA, WITH LONG-TERM CURRENT USE OF INSULIN (HCC): Primary | ICD-10-CM

## 2022-08-30 DIAGNOSIS — I10 ESSENTIAL HYPERTENSION, BENIGN: ICD-10-CM

## 2022-08-30 LAB — HBA1C MFR BLD HPLC: 7.2 %

## 2022-08-30 PROCEDURE — 99214 OFFICE O/P EST MOD 30 MIN: CPT | Performed by: INTERNAL MEDICINE

## 2022-08-30 PROCEDURE — 83036 HEMOGLOBIN GLYCOSYLATED A1C: CPT | Performed by: INTERNAL MEDICINE

## 2022-08-30 PROCEDURE — 3051F HG A1C>EQUAL 7.0%<8.0%: CPT | Performed by: INTERNAL MEDICINE

## 2022-08-30 NOTE — PROGRESS NOTES
Chief Complaint   Patient presents with    Diabetes     PCP and Pharmacy verified    Other     POC A1C pending     History of Present Illness: Eva Reddy is a 58 y.o. male here for follow up of diabetes. Weight down 11 lbs since last visit in 2/22. Has continued to have trouble with his stomach and belching and constipation. Previously we decreased his trulicity from 1.5 to 5.79 mg weekly in 2018 and theses symptoms did persist off trulicity for a few weeks but it's possible this med is just not agreeing with him so we'll try staying off it completely and using jardiance instead. He had GI side effects with metformin in the past so will not use this either. He was having low sugars on 28 units of toujeo in July so he has been on 25 units since then and is no longer having lows. Compliant with BP and lipid regimen. Has eliminated sweet drinks as best as possible and overall portion sizes are much smaller. May still eat cereal or candy closer to 10 pm.      Current Outpatient Medications   Medication Sig    insulin glargine U-300 conc (Toujeo SoloStar U-300 Insulin) 300 unit/mL (1.5 mL) inpn pen INJECT 25 UNITS UNDER THE SKIN ONCE DAILY    linaCLOtide (LINZESS) 145 mcg cap capsule Take  by mouth Daily (before breakfast). escitalopram oxalate (LEXAPRO) 10 mg tablet Take 10 mg by mouth daily. tiZANidine (ZANAFLEX) 4 mg tablet Take 4 mg by mouth two (2) times daily as needed for Muscle Spasm(s). Trulicity 7.41 UA/6.2 mL sub-q pen INJECT 0.5ML (0.75MG) UNDER THE SKIN EVERY 7 DAYS    FreeStyle Oliva 14 Day Sensor kit USE AS DIRECTED EVERY 14 DAYS    Insulin Needles, Disposable, (BD Ultra-Fine Short Pen Needle) 31 gauge x 5/16\" ndle USE FOR INJECTING INSULIN ONCE DAILY    atorvastatin (LIPITOR) 20 mg tablet Take 1 Tablet by mouth nightly.     spironolactone (ALDACTONE) 25 mg tablet TAKE 1 TABLET BY MOUTH ONCE A DAY    omeprazole (PRILOSEC) 40 mg capsule TAKE 1 CAPSULE BY MOUTH EVERY DAY BEFORE A MEAL sucralfate (CARAFATE) 1 gram tablet TAKE 1 TABLET BY MOUTH TWO TIMES A DAY    FREESTYLE PARESH 14 DAY READER misc Use as directed    amLODIPine (NORVASC) 10 mg tablet Take one tablet by mouth one time daily    lisinopril (PRINIVIL, ZESTRIL) 40 mg tablet TAKE ONE TABLET BY MOUTH ONE TIME DAILY    Cholecalciferol, Vitamin D3, 1,000 unit cap Take 1,000 Units by mouth daily. allopurinol (ZYLOPRIM) 300 mg tablet TAKE ONE TABLET BY MOUTH ONE TIME DAILY     carvedilol (COREG) 6.25 mg tablet Take 1 Tab by mouth two (2) times daily (with meals). (Patient taking differently: Take 6.25 mg by mouth. 2 tabs daily)     No current facility-administered medications for this visit. Allergies   Allergen Reactions    Ciprofloxacin Itching and Swelling    Flagyl [Metronidazole] Itching and Swelling     Review of Systems: PER HPI    Physical Examination:  Blood pressure 128/78, pulse (!) 49, height 5' 9\" (1.753 m), weight 207 lb (93.9 kg). General: pleasant, no distress, good eye contact   Neck: no carotid bruits  Cardiovascular: regular, normal rate, nl s1 and s2, no m/r/g,   Respiratory: clear bilaterally  Integumentary: no edema,   Psychiatric: normal mood and affect    Data Reviewed:   Component      Latest Ref Rng & Units 8/30/2022          10:43 AM   Hemoglobin A1c (POC)      % 7.2       Assessment/Plan:     1. Uncontrolled type 2 diabetes mellitus with diabetic nephropathy, with long-term current use of insulin ([facility] [address]): his most recent Hgb A1c was 7.2% in 8/22 down from 9.2% in 2/22 up from 7.5% in 8/21 down from 8% in 5/21 down from 9.6% in 2/21 up from 7.1% in 8/20 down from 7.9% in 5/20 down from 9.9% in 2/20 up from 6.7% in 8/19 down from 9.4% in 3/19 up from 8.3% in 11/18 down 13% in 7/18 up from 10.4% in 2/18 up from 6.5% in 8/17. His A1c is much better with weight loss but I'm concerned about persistent GI symptoms so will stop the trulicity and try jardiance instead.   - begin jardiance 10 mg daily  - cont toujeo 25 units daily  - stop trulicity 5.22 mg weekly  - check bs 3-5 times daily due to fluctuating sugars and hypoglycemia  - cont freestyle juan c 14 day system  - foot exam done 8/21  - optho UTD 11/19--will obtain report  - microalbumin 55.1 8/17, down to 8.1 in 7/18 and normal ever since, last in 2/22  - check A1c and cmp and microalbumin prior to next visit          2. Essential hypertension, benign: his BP was at goal < 140/90  - cont amlodipine 10 mg daily  - cont coreg 6.25 mg daily  - cont lisinopril 40 mg daily  - cont spironolactone 50 mg daily        3. Hyperlipidemia with target LDL less than 100:  in 8/17 and was just started on atorva 20 mg daily and down to 38 in 2/18.  and non- in 7/18 with A1c of 13%. LDL 59 and  in 3/19. Was off atorva in 8/19 and LDL 69,  so restarted and LDL 62 and  in 2/20. LDL 71 and  in 8/20. LDL 60 and  in 2/21. LDL 71 and  in 8/21. LDL 84 and  in 2/22.  - cont atorva 20 mg daily  - check lipids prior to next visit          Patient Instructions   1) Your Hemoglobin A1c (3 month test of blood sugar) has improved from 9.2% to 7.2% and goal is under 7% so you have had a good improvement in your Diabetes control. 2) Please stop the trulicity and we'll see if this helps with GI symptoms you have had. Instead we will try jardiance. Jardiance can help lower your A1c and weight by taking one 10 mg tablet every morning before breakfast to help lower your blood sugar by eliminating excess sugar through the kidneys. Take the sample for 2 weeks and I sent a prescription to your pharmacy to process and we'll complete any authorization necessary. Watch for any increase in burning with urination or itching or discharge around the penis (may occur in 5-10% of patients). As long as you are tolerating this, let me know and we can send a [facility] supply [facility]     3) Stay on 25 units of toujeo for now and as long as your fasting sugars stay in the  range on the jardiance, this should be a good dose. If your sugars are rising over 130, then you may need to go back to 28 units of toujeo. Follow-up and Dispositions    Return in about 6 months (around 2/28/2023). Copy sent to:  Dr. Madalyn Castillo [place]    Lab follow up: 09/10/22  Component      Latest Ref Rng & Units 9/9/2022 9/9/2022 9/9/2022           4:35 PM  4:35 PM  4:35 PM   Glucose      65 - 99 mg/dL  187 (H)    BUN      8 - 27 mg/dL  23    Creatinine      0.76 - 1.27 mg/dL  1.80 (H)    eGFR      >59 mL/min/1.73  42 (L)    BUN/Creatinine ratio      10 - 24  13    Sodium      134 - 144 mmol/L  141    Potassium      3.5 - 5.2 mmol/L  4.0    Chloride      96 - 106 mmol/L  104    CO2      20 - 29 mmol/L  22    Calcium      8.6 - 10.2 mg/dL  9.7    Protein, total      6.0 - 8.5 g/dL  7.8    Albumin      3.8 - 4.8 g/dL  4.5    GLOBULIN, TOTAL      1.5 - 4.5 g/dL  3.3    A-G Ratio      1.2 - 2.2  1.4    Bilirubin, total      0.0 - 1.2 mg/dL  0.6    Alk. phosphatase      44 - 121 IU/L  87    AST      0 - 40 IU/L  33    ALT      0 - 44 IU/L  37    Cholesterol, total      100 - 199 mg/dL   143   Triglyceride      0 - 149 mg/dL   311 (H)   HDL Cholesterol      >39 mg/dL   26 (L)   VLDL, calculated      5 - 40 mg/dL   50 (H)   LDL, calculated      0 - 99 mg/dL   67   Hemoglobin A1c, (calculated)      4.8 - 5.6 % 7.5 (H)     Estimated average glucose      mg/dL 169       Sent him the following message through [employer]:    I was confused why you went and had labs drawn now as you weren't due to go again until prior to your next visit but in any case I wanted to update you on your recent lab results:    Hemoglobin A1c is a 3 month marker of your diabetes control. Goal is less than 7%. Yours is 7.5% up from 7.2% when checked in my office on 8/30/22.   Continue to work on your diet and exercise and take all your medications as directed. How is your stomach feeling with stopping the trulicity and changing to jardiance? Let me know when you have a chance. -------------------------------------------------------------------------------------------------------------------  Total Cholesterol is the total number of cholesterol particles in your blood. Goal is less than 200. Triglycerides are the short term fats in your blood. Goal is less than 150. HDL is the good cholesterol in your blood. Goal is more than 50 if you are a woman and 40 if you are a man. LDL is the bad cholesterol in your blood. Goal is less than 100 unless you have a history of heart disease or stroke and then goal is under 70. Your triglycerides went up possibly from what you ate the night before the lab draw. Continue to follow a low cholesterol diet. Try to limit the amount of fried foods, fatty foods, butter, gravy, red meat, ice cream, cheese, and eggs in your diet, which are all high in cholesterol.  -------------------------------------------------------------------------------------------------------------------  BUN and creatinine are markers of kidney function. Your creatinine mk slightly from 1.69 at last check to 1.8 with starting the jardiance but this is very common and I'm not concerned. -------------------------------------------------------------------------------------------------------------------  ALT and AST are markers of liver function.   Your values are normal.

## 2022-08-30 NOTE — TELEPHONE ENCOUNTER
8/30/2022  3:08 PM        Pt wife Yannick Zelaya called and stated she was returning Vevelyn Catching phone call. [facility]#[phone]      Thanks,  Sharon Fillers

## 2022-08-30 NOTE — PATIENT INSTRUCTIONS
1) Your Hemoglobin A1c (3 month test of blood sugar) has improved from 9.2% to 7.2% and goal is under 7% so you have had a good improvement in your Diabetes control. 2) Please stop the trulicity and we'll see if this helps with GI symptoms you have had. Instead we will try jardiance. Jardiance can help lower your A1c and weight by taking one 10 mg tablet every morning before breakfast to help lower your blood sugar by eliminating excess sugar through the kidneys. Take the sample for 2 weeks and I sent a prescription to your pharmacy to process and we'll complete any authorization necessary. Watch for any increase in burning with urination or itching or discharge around the penis (may occur in 5-10% of patients). As long as you are tolerating this, let me know and we can send a 90 day supply [facility]. 3) Stay on 25 units of toujeo for now and as long as your fasting sugars stay in the  range on the jardiance, this should be a good dose. If your sugars are rising over 130, then you may need to go back to 28 units of toujeo.

## 2022-08-31 ENCOUNTER — TELEPHONE (OUTPATIENT)
Dept: ENDOCRINOLOGY | Age: 62
End: 2022-08-31

## 2022-08-31 NOTE — TELEPHONE ENCOUNTER
Sent him the following message through [employer]:    I submitted an authorization for the jardiance through our electronic system called covermymeds and received notification that this med has been approved so you should be able to get this from the [address] that normally dispenses this for you. Please let me know if you have any trouble getting this filled.

## 2022-08-31 NOTE — TELEPHONE ENCOUNTER
Spoke to the pt and informed him that I spoke to Moni Reyes with [employer] and we were able to populate a new PA form to start for his Jardiance. Informed him that I started the PA and sent it to Dr. Unruly Perdomo for completion. Patient understood with no further questions.

## 2022-09-10 LAB
ALBUMIN SERPL-MCNC: 4.5 G/DL (ref 3.8–4.8)
ALBUMIN/GLOB SERPL: 1.4 {RATIO} (ref 1.2–2.2)
ALP SERPL-CCNC: 87 IU/L (ref 44–121)
ALT SERPL-CCNC: 37 IU/L (ref 0–44)
AST SERPL-CCNC: 33 IU/L (ref 0–40)
BILIRUB SERPL-MCNC: 0.6 MG/DL (ref 0–1.2)
BUN SERPL-MCNC: 23 MG/DL (ref 8–27)
BUN/CREAT SERPL: 13 (ref 10–24)
CALCIUM SERPL-MCNC: 9.7 MG/DL (ref 8.6–10.2)
CHLORIDE SERPL-SCNC: 104 MMOL/L (ref 96–106)
CHOLEST SERPL-MCNC: 143 MG/DL (ref 100–199)
CO2 SERPL-SCNC: 22 MMOL/L (ref 20–29)
CREAT SERPL-MCNC: 1.8 MG/DL (ref 0.76–1.27)
EGFR: 42 ML/MIN/1.73
EST. AVERAGE GLUCOSE BLD GHB EST-MCNC: 169 MG/DL
GLOBULIN SER CALC-MCNC: 3.3 G/DL (ref 1.5–4.5)
GLUCOSE SERPL-MCNC: 187 MG/DL (ref 65–99)
HBA1C MFR BLD: 7.5 % (ref 4.8–5.6)
HDLC SERPL-MCNC: 26 MG/DL
IMP & REVIEW OF LAB RESULTS: NORMAL
INTERPRETATION: NORMAL
LDLC SERPL CALC-MCNC: 67 MG/DL (ref 0–99)
POTASSIUM SERPL-SCNC: 4 MMOL/L (ref 3.5–5.2)
PROT SERPL-MCNC: 7.8 G/DL (ref 6–8.5)
SODIUM SERPL-SCNC: 141 MMOL/L (ref 134–144)
TRIGL SERPL-MCNC: 311 MG/DL (ref 0–149)
VLDLC SERPL CALC-MCNC: 50 MG/DL (ref 5–40)

## 2022-09-13 ENCOUNTER — TELEPHONE (OUTPATIENT)
Dept: ENDOCRINOLOGY | Age: 62
End: 2022-09-13

## 2022-09-13 NOTE — TELEPHONE ENCOUNTER
Informed pt Dr. Carl Arevalo has sent a [employer] message that you have not yet read. Please read this as soon as possible. Pt verbalized understanding.

## 2022-09-13 NOTE — TELEPHONE ENCOUNTER
Please call pt to let him know he has an unread message in [employer]:    I was confused why you went and had labs drawn now as you weren't due to go again until prior to your next visit but in any case I wanted to update you on your recent lab results:     Hemoglobin A1c is a 3 month marker of your diabetes control. Goal is less than 7%. Yours is 7.5% up from 7.2% when checked in my office on 8/30/22. Continue to work on your diet and exercise and take all your medications as directed. How is your stomach feeling with stopping the trulicity and changing to jardiance? Let me know when you have a chance. -------------------------------------------------------------------------------------------------------------------  Your triglycerides (short term fats) went up possibly from what you ate the night before the lab draw.  -------------------------------------------------------------------------------------------------------------------  BUN and creatinine are markers of kidney function. Your creatinine mk slightly from 1.69 at last check to 1.8 with starting the jardiance but this is very common and I'm not concerned. -------------------------------------------------------------------------------------------------------------------  ALT and AST are markers of liver function.   Your values are normal.

## 2022-09-27 ENCOUNTER — TELEPHONE (OUTPATIENT)
Dept: ENDOCRINOLOGY | Age: 62
End: 2022-09-27

## 2022-09-27 NOTE — TELEPHONE ENCOUNTER
Spoke with Doug Lopez at [facility] and added E11.65, Z79.4. Doug Lopez stated the bill has been updated with codes and will be sent to pt's insurance.

## 2022-09-27 NOTE — TELEPHONE ENCOUNTER
9/27/2022    Zabrina from [facility] called and left a vm at 2:08 pm stating she is calling to clarify the diagnostic code for date of service 9/9/2022. They can be reached at [phone] ref# [id number] and fax# [phone].     Thanks,   Regino Number

## 2022-09-29 RX ORDER — ALLOPURINOL 300 MG/1
150 TABLET ORAL DAILY
COMMUNITY

## 2022-10-19 ENCOUNTER — TELEPHONE (OUTPATIENT)
Dept: ENDOCRINOLOGY | Age: 62
End: 2022-10-19

## 2022-10-19 NOTE — TELEPHONE ENCOUNTER
Contacted pt to see if he requested a refill of Trulicity from [facility]. Pt states he did not, it may be automatic. Pt states he is not taking Trulicity at this time.

## 2022-10-27 ENCOUNTER — TELEPHONE (OUTPATIENT)
Dept: ENDOCRINOLOGY | Age: 62
End: 2022-10-27

## 2022-10-27 NOTE — TELEPHONE ENCOUNTER
Please call him and let him know I received a refill request from [facility] for his trulicity. Per our visit in August, I thought we had stopped this medication and changed to jardiance. Can you ask him if he requested this refill or if this was an automated request so I can know whether to approve or deny this?

## 2022-10-28 NOTE — TELEPHONE ENCOUNTER
Will fax the form back to [facility] refusing this refill and asking to take this med off his profile.

## 2022-10-28 NOTE — TELEPHONE ENCOUNTER
Pt was contacted on 10/19/2022 regarding request, Mr Marlen Lino stated in that encounter that he did not request a refill as he is not taking Trulicity at this time.

## 2022-11-28 ENCOUNTER — TELEPHONE (OUTPATIENT)
Dept: SURGERY | Age: 62
End: 2022-11-28

## 2022-12-01 ENCOUNTER — OFFICE VISIT (OUTPATIENT)
Dept: SURGERY | Age: 62
End: 2022-12-01
Payer: COMMERCIAL

## 2022-12-01 VITALS
SYSTOLIC BLOOD PRESSURE: 136 MMHG | HEART RATE: 76 BPM | RESPIRATION RATE: 20 BRPM | OXYGEN SATURATION: 98 % | TEMPERATURE: 97 F | HEIGHT: 69 IN | WEIGHT: 210.2 LBS | BODY MASS INDEX: 31.13 KG/M2 | DIASTOLIC BLOOD PRESSURE: 84 MMHG

## 2022-12-01 DIAGNOSIS — K80.20 GALLSTONES: Primary | ICD-10-CM

## 2022-12-01 RX ORDER — DULAGLUTIDE 1.5 MG/.5ML
1.5 INJECTION, SOLUTION SUBCUTANEOUS
COMMUNITY
Start: 2022-11-14

## 2022-12-01 NOTE — Clinical Note
12/16/2022    Patient: Suman Hoover   YOB: 1960   Date of Visit: 12/1/2022     Werner Hudson MD  [place] [address]  Via Fax: [phone]. Ramos Walton, [address] [place] [address]  [facility] [phone]  Via Fax: [phone]    Dear Werner Hudson MD  [facility]. Ramos Walton MD,      Thank you for referring Mr. Cele Leggett to  [facility] for evaluation. My notes for this consultation are attached. If you have questions, please do not hesitate to call me. I look forward to following your patient along with you.       Sincerely,    Humphrey Morrell MD

## 2022-12-01 NOTE — PROGRESS NOTES
HISTORY OF PRESENT ILLNESS  Tyree Salter III is a 58 y.o. male. Previously seen in this office in 2020 by Dr Katie Goodwin  Noted to have a tiny umbilical hernia with associated diastases. Uncontrolled diabetes at the time. Was getting diverticulitis about once a year. No prior abdominal surgery. Diabetes better controlled now. Hemoglobin A1c in September was 7.5. Followed by Dr. Johnnie Simmons. Last CT scan in the system is from 2019. Reviewed and independent interpreted images. Noted to have diverticulitis on the skin. No undrained fluid collection. He has a 5.5 cm diastases with a tiny umbilical hernia. Also has bilateral cord lipomas. Here today for evaluation of his gallbladder    US with sludge    Not reporting very specific symptoms. Abdominal pain. No change with eating. BMs some constipation  Rich gastro: colonoscopy today      ____________________________________________________________________________  Patient presents with:  Abdominal Pain: Seen at the request of Dr Glenda Martel for evaluation of his gallbladder    /84 (BP 1 Location: Left upper arm, BP Patient Position: Sitting)   Pulse 76   Temp 97 °F (36.1 °C) (Temporal)   Resp 20   Ht 5' 9\" (1.753 m)   Wt 95.3 kg (210 lb 3.2 oz)   SpO2 98%   BMI 31.04 kg/m²   Past Medical History:  No date: Arthritis  2009: Diabetes ([address]  No date: GERD (gastroesophageal reflux disease)  No date: Gout  3/5/2013: Hyperlipidemia LDL goal < 100  No date: Hypertension  No date: MARIA DEL CARMEN on CPAP  4/7/2011: Renal insufficiency  Past Surgical History:  No date: HX HEENT      Comment:  ingrown removed from back of scalp  No date: HX ROTATOR CUFF REPAIR  No date: HX SHOULDER ARTHROSCOPY;  Bilateral  Social History    Socioeconomic History      Marital status:     Tobacco Use      Smoking status: Never      Smokeless tobacco: Never    Substance and Sexual Activity      Alcohol use: No        Alcohol/week: 0.0 standard drinks      Drug use: No      Sexual activity: Yes        Partners: Female        Birth control/protection: Surgical    Social History Narrative      Lives in [place] with wife. Has a 24 yo son and 23 yo daughter. Works as a  at [employer]. Likes to play golf. Review of patient's family history indicates:  Problem: Diabetes      Relation: Father          Age of Onset: (Not Specified)  Problem: Cancer      Relation: Father          Age of Onset: (Not Specified)          Comment: prostate  Problem: Hypertension      Relation: Father          Age of Onset: (Not Specified)  Problem: No Known Problems      Relation: Mother          Age of Onset: (Not Specified)  Problem: Diabetes      Relation: Sister          Age of Onset: (Not Specified)  Problem: No Known Problems      Relation: Brother          Age of Onset: (Not Specified)    Current Outpatient Medications:  allopurinoL (ZYLOPRIM) 300 mg tablet, Take 150 mg by mouth daily. Dose reduced by renal on 9/27/22  empagliflozin (Jardiance) 10 mg tablet, Take 1 Tablet by mouth daily. insulin glargine U-300 conc (Toujeo SoloStar U-300 Insulin) 300 unit/mL (1.5 mL) inpn pen, INJECT 25 UNITS UNDER THE SKIN ONCE DAILY  linaCLOtide (LINZESS) 145 mcg cap capsule, Take  by mouth as needed. escitalopram oxalate (LEXAPRO) 10 mg tablet, Take 10 mg by mouth daily. tiZANidine (ZANAFLEX) 4 mg tablet, Take 4 mg by mouth two (2) times daily as needed for Muscle Spasm(s). FreeStyle Oliva 14 Day Sensor kit, USE AS DIRECTED EVERY 14 DAYS  Insulin Needles, Disposable, (BD Ultra-Fine Short Pen Needle) 31 gauge x 5/16\" ndle, USE FOR INJECTING INSULIN ONCE DAILY  atorvastatin (LIPITOR) 20 mg tablet, Take 1 Tablet by mouth nightly. spironolactone (ALDACTONE) 25 mg tablet, TAKE 1 TABLET BY MOUTH ONCE A DAY  omeprazole (PRILOSEC) 40 mg capsule, Take 40 mg by mouth daily as needed.   sucralfate (CARAFATE) 1 gram tablet, TAKE 1 TABLET BY MOUTH TWO TIMES A DAY  FREESTYLE OLIVA 14 DAY READER mis, Use as directed  amLODIPine (NORVASC) 10 mg tablet, Take one tablet by mouth one time daily  lisinopril (PRINIVIL, ZESTRIL) 40 mg tablet, TAKE ONE TABLET BY MOUTH ONE TIME DAILY  Cholecalciferol, Vitamin D3, 1,000 unit cap, Take 1,000 Units by mouth daily. carvedilol (COREG) 6.25 mg tablet, Take 1 Tab by mouth two (2) times daily (with meals). (Patient taking differently: Take 6.25 mg by mouth. 2 tabs daily)    No current facility-administered medications for this visit. Allergies:  -- Ciprofloxacin -- Itching and Swelling   -- Flagyl [Metronidazole] -- Itching and Swelling  _____________________________________________________________________________            Abdominal Pain  The history is provided by the Patient. This is a chronic problem. The current episode started more than 1 week ago. The problem occurs constantly. The problem has not changed since onset. Associated symptoms include abdominal pain. Pertinent negatives include no chest pain, no headaches and no shortness of breath. Nothing aggravates the symptoms. Nothing relieves the symptoms. The treatment provided no relief. Review of Systems   Constitutional:  Negative for chills, fever and weight loss. HENT:  Negative for ear pain. Eyes:  Negative for pain. Respiratory:  Negative for shortness of breath. Cardiovascular:  Negative for chest pain. Gastrointestinal:  Positive for abdominal pain. Negative for blood in stool. Genitourinary:  Negative for hematuria. Musculoskeletal:  Negative for joint pain. Skin:  Negative for rash. Neurological:  Negative for dizziness, focal weakness, seizures and headaches. Endo/Heme/Allergies:  Does not bruise/bleed easily. Psychiatric/Behavioral:  The patient does not have insomnia. Physical Exam  Constitutional:       General: He is not in acute distress. Appearance: He is well-developed. HENT:      Head: Normocephalic. Mouth/Throat:      Pharynx: No oropharyngeal exudate.    Eyes: General: No scleral icterus. Pupils: Pupils are equal, round, and reactive to light. Neck:      Trachea: No tracheal deviation. Cardiovascular:      Rate and Rhythm: Normal rate and regular rhythm. Heart sounds: Normal heart sounds. Pulmonary:      Effort: Pulmonary effort is normal.      Breath sounds: Normal breath sounds. No wheezing. Abdominal:      General: There is no distension. Palpations: Abdomen is soft. There is no mass. Tenderness: There is no abdominal tenderness. Hernia: A hernia (non-tender) is present. Musculoskeletal:         General: Normal range of motion. Cervical back: Neck supple. Lymphadenopathy:      Cervical: No cervical adenopathy. Skin:     General: Skin is warm. Findings: No erythema or rash. Neurological:      Mental Status: He is alert and oriented to person, place, and time. Psychiatric:         Behavior: Behavior normal.       ASSESSMENT and PLAN    ICD-10-CM ICD-9-CM    1. Gallstones  K80.20 574.20 NM HEPATOBILIARY DUCT SCAN        Reviewed findings with Marion Peñaloza III. His umbilical hernia is essentially asymptomatic. He has some nonspecific abdominal complaints. He has been evaluated for chronic constipation. Started on Linzess. I suggested we do a HIDA scan to rule out an obstructed cystic duct. Provided the cystic duct is patent and his symptoms do not worsen or improve may be reasonable to hold off on any intervention. He will continue to use blood sugars under control. Follow his bowel regimen to resolve the constipation. Further management after the HIDA scan. He expressed understanding of our discussion and is agreeable with the plan. Thank you for this consult. Addendum 12/16:  HIDA scan with patent cystic duct. I discussed this with Giovanna Allan He has recently been feeling better and has been taking Linzess as needed with improvement in his bowel movements.   Will follow recommendations as discussed in the office. He will call me if his symptoms do not fully resolve or worsen. He expressed understanding of our discussion and agreeable with the plan.

## 2022-12-01 NOTE — PROGRESS NOTES
Identified pt with two pt identifiers(name and ). Reviewed record in preparation for visit and have obtained necessary documentation. All patient medications has been reviewed. Chief Complaint   Patient presents with    Abdominal Pain     Seen at the request of Dr Jojo Orozco for evaluation of his gallbladder       Health Maintenance Due   Topic    COVID-19 Vaccine (1)    Pneumococcal 0-64 years (1 - PCV)    Eye Exam Retinal or Dilated     DTaP/Tdap/Td series (1 - Tdap)    Colorectal Cancer Screening Combo     Shingrix Vaccine Age 50> (1 of 2)    Flu Vaccine (1)    Foot Exam Q1        Vitals:    22 1316   BP: 136/84   Pulse: 76   Resp: 20   Temp: 97 °F (36.1 °C)   TempSrc: Temporal   SpO2: 98%   Weight: 95.3 kg (210 lb 3.2 oz)   Height: 5' 9\" (1.753 m)   PainSc:   0 - No pain       4. Have you been to the ER, urgent care clinic since your last visit? Hospitalized since your last visit? No    5. Have you seen or consulted any other health care providers outside of the [address] [place] [address] since your last visit? Include any pap smears or colon screening. No      Patient is accompanied by wife I have received verbal consent from Quita Bolton III to discuss any/all medical information while they are present in the room.

## 2022-12-13 ENCOUNTER — TELEPHONE (OUTPATIENT)
Dept: SURGERY | Age: 62
End: 2022-12-13

## 2022-12-15 ENCOUNTER — HOSPITAL ENCOUNTER (OUTPATIENT)
Dept: NUCLEAR MEDICINE | Age: 62
Discharge: HOME | End: 2022-12-15
Attending: SURGERY
Payer: COMMERCIAL

## 2022-12-15 DIAGNOSIS — K80.20 GALLSTONES: ICD-10-CM

## 2022-12-15 PROCEDURE — 78226 HEPATOBILIARY SYSTEM IMAGING: CPT

## 2022-12-15 RX ORDER — TETRAKIS(2-METHOXYISOBUTYLISOCYANIDE)COPPER(I) TETRAFLUOROBORATE 1 MG/ML
5.4 INJECTION, POWDER, LYOPHILIZED, FOR SOLUTION INTRAVENOUS
Status: DISCONTINUED | OUTPATIENT
Start: 2022-12-15 | End: 2022-12-15 | Stop reason: CLARIF

## 2022-12-15 RX ORDER — KIT FOR THE PREPARATION OF TECHNETIUM TC 99M MEBROFENIN 45 MG/10ML
5.4 INJECTION, POWDER, LYOPHILIZED, FOR SOLUTION INTRAVENOUS
Status: COMPLETED | OUTPATIENT
Start: 2022-12-15 | End: 2022-12-15

## 2022-12-15 RX ADMIN — KIT FOR THE PREPARATION OF TECHNETIUM TC 99M MEBROFENIN 5.4 MILLICURIE: 45 INJECTION, POWDER, LYOPHILIZED, FOR SOLUTION INTRAVENOUS at 10:10

## 2023-01-26 RX ORDER — FLASH GLUCOSE SENSOR
KIT MISCELLANEOUS
Qty: 6 KIT | Refills: 3 | Status: SHIPPED | OUTPATIENT
Start: 2023-01-26

## 2023-02-01 RX ORDER — SPIRONOLACTONE 25 MG/1
TABLET ORAL
Qty: 90 TABLET | Refills: 3 | Status: SHIPPED | OUTPATIENT
Start: 2023-02-01

## 2023-02-10 ENCOUNTER — OFFICE VISIT (OUTPATIENT)
Dept: NEUROLOGY | Age: 63
End: 2023-02-10
Payer: COMMERCIAL

## 2023-02-10 VITALS
BODY MASS INDEX: 31.01 KG/M2 | HEIGHT: 69 IN | RESPIRATION RATE: 16 BRPM | HEART RATE: 69 BPM | OXYGEN SATURATION: 98 % | TEMPERATURE: 98.2 F | DIASTOLIC BLOOD PRESSURE: 78 MMHG | SYSTOLIC BLOOD PRESSURE: 132 MMHG | WEIGHT: 209.4 LBS

## 2023-02-10 DIAGNOSIS — F41.9 ANXIETY AND DEPRESSION: ICD-10-CM

## 2023-02-10 DIAGNOSIS — R41.3 MEMORY DISTURBANCE: Primary | ICD-10-CM

## 2023-02-10 DIAGNOSIS — G47.33 OSA (OBSTRUCTIVE SLEEP APNEA): ICD-10-CM

## 2023-02-10 DIAGNOSIS — F32.A ANXIETY AND DEPRESSION: ICD-10-CM

## 2023-02-10 RX ORDER — ESCITALOPRAM OXALATE 20 MG/1
20 TABLET ORAL DAILY
Qty: 90 TABLET | Refills: 3 | Status: SHIPPED | OUTPATIENT
Start: 2023-02-10

## 2023-02-10 RX ORDER — MELOXICAM 15 MG/1
TABLET ORAL
COMMUNITY
Start: 2023-01-16

## 2023-02-10 NOTE — ASSESSMENT & PLAN NOTE
MMSE was 26/30 demonstrating some mild cognitive issues  There is a family history with his father having a diagnosis of ADD  We will check MRI scan for vascular issues or any other structural issues that could be contributing to symptoms   Patient does have multiple risk factors for stroke to include kidney disease diabetes hypertension and dyslipidemia    Referral for neuropsych evaluation with Dr. Anthony Morris has also been initiated

## 2023-02-10 NOTE — PATIENT INSTRUCTIONS
As per our discussion, there is an appt at [facility] as noted below, please select one neurology practice    05/18/2023 Office Visit Neurology Alfred Donis, [address] [place] [address]    [phone] (Work)    [phone] (Fax)        Referral has been made to Dr. Kylie Mobley for neuropsychiatric evaluation related to cognitive concerns. His  will call you to set up an appointment. However if you have not heard from the office in about a week you can contact their office at [phone]     I have also ordered an MRI scan of your brain to see what is going on in there and if there is anything in there that could be contributing to some of the cognitive concerns  Central scheduling should be calling you to set up the test that have been ordered. If you do not hear from them you can reach out to them at [phone] to get that completed.        You also need to go downstairs to the second floor to the sleep practice with Dr. Klaus Alcazar and set up a follow-up appointment you have not been seen there since 2019      Finally have increased your Lexapro to 20 mg a new prescription was sent to your local pharmacy

## 2023-02-10 NOTE — ASSESSMENT & PLAN NOTE
Patient has been strongly encouraged to use his CPAP machine and to set up an appointment with Dr. Mervin Vazquez who is previously been following him for his sleep disorder for routine evaluation

## 2023-02-10 NOTE — LETTER
2/10/2023    Patient: Luci Espinosa III   YOB: 1960   Date of Visit: 2/10/2023     Jay Sanchez MD  [place] [address]  Via Fax: [phone]    Dear Jay Sanchez MD,      Thank you for referring Mr. Luci Espinosa to [address] [place] [address] for evaluation. My notes for this consultation are attached. If you have questions, please do not hesitate to call me. I look forward to following your patient along with you.       Sincerely,    JUMA Weiss

## 2023-02-10 NOTE — ASSESSMENT & PLAN NOTE
Increase Lexapro to 20 mg/day new prescription was sent to his pharmacy for the 90-day supply and additional refills

## 2023-02-10 NOTE — PROGRESS NOTES
HA was Chief Complaint   Patient presents with    New Patient     States that his wife sent him and states that he forgets some things. States that he is working and has stress with his forced [employer]CF with the company. 

## 2023-02-10 NOTE — PROGRESS NOTES
[address]  In Office NEW Pt VISIT         Misbah Calvin III is a 61 y.o. male who presents today for the following:  Chief Complaint   Patient presents with    New Patient     States that his wife sent him and states that he forgets some things. States that he is working and has stress with his forced residential with the company. ASSESSMENT AND PLAN    1. Memory disturbance  Assessment & Plan:   MMSE was 26/30 demonstrating some mild cognitive issues  There is a family history with his father having a diagnosis of ADD  We will check MRI scan for vascular issues or any other structural issues that could be contributing to symptoms   Patient does have multiple risk factors for stroke to include kidney disease diabetes hypertension and dyslipidemia    Referral for neuropsych evaluation with Dr. Willy Torres has also been initiated  Orders:  -     MRI BRAIN W WO CONT; Future  -     REFERRAL TO NEUROPSYCHOLOGY  2. Anxiety and depression  Assessment & Plan:   Increase Lexapro to 20 mg/day new prescription was sent to his pharmacy for the 90-day supply and additional refills  Orders:  -     escitalopram oxalate (LEXAPRO) 20 mg tablet; Take 1 Tablet by mouth daily. , Normal, Disp-90 Tablet, R-3  3. MARIA DEL CARMEN (obstructive sleep apnea)  Assessment & Plan:   Patient has been strongly encouraged to use his CPAP machine and to set up an appointment with Dr. Andres Thornton who is previously been following him for his sleep disorder for routine evaluation        Patient and/or family was given time to ask questions and voice concerns. I believe all questions concerns were adequately addressed at this  office visit.   Patient and/or family also verbalized agreement and understanding of the above-stated plan    Complex neurologic decision making secondary any or all of the following to include unclear etiology, and /or polypharmacy, and/or significant comorbid conditions, and/or use of high-risk medications which complicate the decision making process related to patient's neurologic diagnosis      ICD-10-CM ICD-9-CM    1. Memory disturbance  R41.3 780.93 MRI BRAIN W WO CONT      REFERRAL TO NEUROPSYCHOLOGY      2. Anxiety and depression  F41.9 300.00 escitalopram oxalate (LEXAPRO) 20 mg tablet    F32. A 311       3. MARIA DEL CARMEN (obstructive sleep apnea)  G47.33 327.23             I attest that 45 minutes was spent on today's visit reviewing medical records and diagnostic testing deemed pertinent to this patient's care, along with direct time spent at patient's visit including the history, physical assessment and plan, discussing diagnosis and management along with documentation. HPI    Patient was referred to the practice for the following reason[s]:  Memory concerns    Patient is accompanied by:self  History is obtained predominantly by:self and medical records    Pt is here by himself  Forgets appts  Forgets takes meds  Wife oversees everything including finances  Still driving no recent accidents    Attests to being stressed and depressed    Family Hx: dad with dx of AD            Other significant comorbid conditions/concerns  MARIA DEL CARMEN not using CPAP since the recall  Diabetes  Stage III kidney disease  Dyslipidemia  Hypertension      Pertinent diagnostic data    No visits with results within 3 Month(s) from this visit. Latest known visit with results is:   Office Visit on 08/30/2022   Component Date Value Ref Range Status    Hemoglobin A1c (POC) 08/30/2022 7.2  % Final      No results found for this or any previous visit.     Labs completed through [facility] 10/13/2022 to include TSH CBC with differential B12 and syphilis are all normal    June 2022 patient had hepatitis C and B panel completed which are nonreactive    Allergies   Allergen Reactions    Ciprofloxacin Itching and Swelling    Flagyl [Metronidazole] Itching and Swelling       Current Outpatient Medications   Medication Sig    amylase/protease/papain/papaya (PAPAYA ENZYME, AMYLAS-PROTEAS, PO)     meloxicam (MOBIC) 15 mg tablet TAKE 1 TABLET (15 MG TOTAL) BY MOUTH DAILY. escitalopram oxalate (LEXAPRO) 20 mg tablet Take 1 Tablet by mouth daily. spironolactone (ALDACTONE) 25 mg tablet TAKE 1 TABLET DAILY    FreeStyle Oliva 14 Day Sensor kit USE AS DIRECTED EVERY 14 DAYS    Trulicity 1.5 IS/2.9 mL sub-q pen 1.5 mg by SubCUTAneous route every seven (7) days. allopurinoL (ZYLOPRIM) 300 mg tablet Take 150 mg by mouth daily. Dose reduced by renal on 9/27/22    empagliflozin (Jardiance) 10 mg tablet Take 1 Tablet by mouth daily. insulin glargine U-300 conc (Toujeo SoloStar U-300 Insulin) 300 unit/mL (1.5 mL) inpn pen INJECT 25 UNITS UNDER THE SKIN ONCE DAILY    linaCLOtide (LINZESS) 145 mcg cap capsule Take  by mouth as needed. tiZANidine (ZANAFLEX) 4 mg tablet Take 4 mg by mouth two (2) times daily as needed for Muscle Spasm(s). Insulin Needles, Disposable, (BD Ultra-Fine Short Pen Needle) 31 gauge x 5/16\" ndle USE FOR INJECTING INSULIN ONCE DAILY    atorvastatin (LIPITOR) 20 mg tablet Take 1 Tablet by mouth nightly. omeprazole (PRILOSEC) 40 mg capsule Take 40 mg by mouth daily as needed. sucralfate (CARAFATE) 1 gram tablet TAKE 1 TABLET BY MOUTH TWO TIMES A DAY    FREESTYLE OLIVA 14 DAY READER misc Use as directed    amLODIPine (NORVASC) 10 mg tablet Take one tablet by mouth one time daily    lisinopril (PRINIVIL, ZESTRIL) 40 mg tablet TAKE ONE TABLET BY MOUTH ONE TIME DAILY    Cholecalciferol, Vitamin D3, 1,000 unit cap Take 1,000 Units by mouth daily. carvedilol (COREG) 6.25 mg tablet Take 1 Tab by mouth two (2) times daily (with meals). (Patient taking differently: Take 6.25 mg by mouth. 2 tabs daily)     No current facility-administered medications for this visit.        Past medical history/surgical history, family history, and social history have been reviewed for today's visit      ROS    A ten system review of constitutional, cardiovascular, respiratory, musculoskeletal, endocrine, skin, SHEENT, genitourinary, psychiatric and neurologic systems was obtained and is unremarkable except as mentioned under HPI          EXAMINATION:     Visit Vitals  /78 (BP 1 Location: Left upper arm, BP Patient Position: Sitting, BP Cuff Size: Large adult)   Pulse 69   Temp 98.2 °F (36.8 °C) (Temporal)   Resp 16   Ht 5' 9\" (1.753 m)   Wt 209 lb 6.4 oz (95 kg)   SpO2 98%   BMI 30.92 kg/m²         General appearance: Patient is well-developed and well-nourished in no apparent distress and well groomed.     Psych/mental health:  Affect: Appropriate    PHQ  3 most recent PHQ Screens 2/10/2023   Little interest or pleasure in doing things Not at all   Feeling down, depressed, irritable, or hopeless Not at all   Total Score PHQ 2 0       HEENT:   Normocephalic  With evidence of trauma: No  Full range of motion head neck: Yes  Tenderness to palpation of the head neck region: No      Cardiovascular:     Extremities warm to touch:Yes  Extremity swelling: No  Discoloration: No  Evidence of PVD: No    Respiratory:   Dyspnea on exertion: No   Abnormal effort on casual observation: No   Use of portable oxygen: No   Evidence of cyanosis: No     Musculoskeletal:   Evidence of significant bone deformities: No   Spinal curvature: No     Integumentary:    Obvious bruising: No   Lacerations or discoloration on casual observation: No       Neurological Examination:   Mental Status:        MMSE  Mini Mental State Exam 2/10/2023   What is the Year 1   What is the Season 1   What is the Date 1   What is the Day 0   What is the Month 1   Where are we State 1   Where are we Country 1   Where are we [place] [facility] 1   Where are we Floor 1   Name three objects, then ask the patient to say them 3   Serial sevens Subtract 7 from 100 in increments 5   Ask for the three objects repeated above 0   Name a pencil 1   Name a watch 1   Have the patient repeat this phrase \"No ifs, ands, or buts\" 1   Three stage command: Take the paper in your right hand 1   Fold the paper in half 1   Put the paper on the floor 1   Read and obey the following: CLOSE YOUR EYES 1   Have the patient write a sentence 1   Have the patient copy a figure 1   Mini Mental Score 26   Some recent data might be hidden        Pt oriented to full name  and age  MMSE 26/30  there was nothing concerning on general observation and discussion. Alert oriented and appropriate to general conversation  Normal processing on general observation  Followed conversation and responded seemingly appropriate throughout the office visit  No word finding difficulties noted on casual observation  Able to follow directions without difficulty     Cranial Nerves:    EOMs intact gaze is conjugate  No nystagmus is appreciated  Facial motor intact bilaterally  Hearing intact to conversation  Voice with normal projection, no evidence of secretion pooling  Shoulder shrug intact bilaterally  No tongue deviation appreciated     Motor:   Normal bulk  No tremor appreciated on today's exam  No abnormal movements appreciated on today's exam  Moves extremities spontaneously and with purpose  5/5 x 4      Sensation: Intact to light touch    Coordination/Cerebellar:   FTN: Intact bilaterally    Gait: Ambulates independently    Reflexes:  symmetrical but diminished    Fall risk assessment  No flowsheet data found. Follow-up and Dispositions    Return in about 6 months (around 8/10/2023) for In office appointment.            Deisy Mendiola, MS, ANP-BC, [facility]

## 2023-02-13 ENCOUNTER — OFFICE VISIT (OUTPATIENT)
Dept: SLEEP MEDICINE | Age: 63
End: 2023-02-13
Payer: COMMERCIAL

## 2023-02-13 VITALS
BODY MASS INDEX: 30.66 KG/M2 | DIASTOLIC BLOOD PRESSURE: 85 MMHG | HEIGHT: 69 IN | HEART RATE: 76 BPM | OXYGEN SATURATION: 97 % | SYSTOLIC BLOOD PRESSURE: 159 MMHG | WEIGHT: 207 LBS | TEMPERATURE: 98.2 F

## 2023-02-13 DIAGNOSIS — I10 ESSENTIAL HYPERTENSION, BENIGN: ICD-10-CM

## 2023-02-13 DIAGNOSIS — Z79.4 TYPE 2 DIABETES MELLITUS WITH HYPERGLYCEMIA, WITH LONG-TERM CURRENT USE OF INSULIN (HCC): ICD-10-CM

## 2023-02-13 DIAGNOSIS — E78.5 HYPERLIPIDEMIA WITH TARGET LDL LESS THAN 100: ICD-10-CM

## 2023-02-13 DIAGNOSIS — E11.65 TYPE 2 DIABETES MELLITUS WITH HYPERGLYCEMIA, WITH LONG-TERM CURRENT USE OF INSULIN (HCC): ICD-10-CM

## 2023-02-13 DIAGNOSIS — G47.33 OBSTRUCTIVE SLEEP APNEA (ADULT) (PEDIATRIC): Primary | ICD-10-CM

## 2023-02-13 PROCEDURE — 99213 OFFICE O/P EST LOW 20 MIN: CPT | Performed by: INTERNAL MEDICINE

## 2023-02-13 PROCEDURE — 3079F DIAST BP 80-89 MM HG: CPT | Performed by: INTERNAL MEDICINE

## 2023-02-13 PROCEDURE — 3077F SYST BP >= 140 MM HG: CPT | Performed by: INTERNAL MEDICINE

## 2023-02-13 NOTE — PROGRESS NOTES
[address] [facility] [place]. [place], [address]  Tel.  [phone]  Fax. [address] [facility] 60  [facility], [address] [facility]  Tel.  [phone]  Fax. [phone] [address] [facility] Behzad Olson   Tel.  [phone]  Fax. [phone]     S>Isaac Mcclure III is a 61 y.o. male seen for Sleep Problem (Pap follow up)    He was previously seen by me here back in 2018 for PAP follow-up. Original diagnosis of sleep apnea in 2015 with an AHI of 25/h. He was using his CPAP setting 8 cm regularly until he found out about the recall. He registered and received his replacement DreamStation unit a few months ago. He has not yet started using it as he wanted to make sure that everything was all right with it. I do not have a download from his old device but he says he was using it regularly. He has not used a Pap for several months now. Interested in getting back to Pap therapy therapy. Previous records reviewed. He does feel that he sleeps better with the Pap. Allergies   Allergen Reactions    Ciprofloxacin Itching and Swelling    Flagyl [Metronidazole] Itching and Swelling       He has a current medication list which includes the following prescription(s): amylase/protease/papain/papaya, meloxicam, escitalopram oxalate, spironolactone, freestyle juan c 14 day sensor, trulicity, allopurinol, empagliflozin, toujeo solostar u-300 insulin, linaclotide, tizanidine, insulin needles (disposable), atorvastatin, omeprazole, sucralfate, freestyle juan c 14 day reader, amlodipine, lisinopril, cholecalciferol, and carvedilol. .      He  has a past medical history of Anxiety and depression (2/10/2023), Arthritis, Diabetes ([address] (2009), GERD (gastroesophageal reflux disease), Gout, History of abdominal hernia, Hyperlipidemia LDL goal < 100 (03/05/2013), Hypertension, MARIA DEL CARMEN on CPAP, and Renal insufficiency (04/07/2011).     He has no past medical history of Abuse, Anemia NEC, Arrhythmia, Asthma, Autoimmune disease ([facility] CAD (coronary artery disease), Calculus of kidney, Cancer ([facility] Chronic pain, Congestive heart failure, unspecified, Contact dermatitis and other eczema, due to unspecified cause, COPD, Headache(784.0), Liver disease, Psychotic disorder ([facility] PUD (peptic ulcer disease), Seizures ([facility] Stroke ([facility] Thromboembolus ([facility] Thyroid disease, or Trauma. Ecru Sleepiness Score: 2   and Modified F.O.S.Q. Score Total / 2: 18.5      O>    Visit Vitals  BP (!) 159/85 (BP 1 Location: Right upper arm, BP Patient Position: Sitting, BP Cuff Size: Adult long)   Pulse 76   Temp 98.2 °F (36.8 °C) (Oral)   Ht 5' 9\" (1.753 m)   Wt 207 lb (93.9 kg)   SpO2 97%   BMI 30.57 kg/m²           General:   Alert, oriented, not in distress   Neck:   No JVD    Chest/Lungs:  symetrical lung expansion , no accessory muscle use    Extremities:  no obvious rashes , negative edema    Neuro:  No focal deficits ; No obvious tremor    Psych:  Normal affect ,  Normal countenance ;       A>    ICD-10-CM ICD-9-CM    1. Obstructive sleep apnea (adult) (pediatric)  G47.33 327.23 AMB SUPPLY ORDER      2. Essential hypertension, benign  I10 401.1       3. Type 2 diabetes mellitus with hyperglycemia, with long-term current use of insulin ([facility])  E11.65 250.00     Z79.4 790.29      V58.67               P>      PAP continues to benefit patient and remains necessary for the treatment of his sleep apnea  Change setting to 8-12 cmH20. He was advised to resume PAP on a nightly basis  He should be seen in follow-up to see how he is doing on the APAP settings in about 2 months. Replacement supplies ordered today. Proper sleep hygiene reviewed. 2. Hypertension -suboptimal control on current regimen. he will continue his current regimen. he will continue to monitor at home and with his PMD for further adjustments as needed. I have reviewed the relationship between hypertension as it relates to sleep-disordered breathing. 3.Type 2 diabetes -suboptimal control. he continues his current regimen. he will continue to monitor at home and with his PMD.  I have reviewed the relationship between sleep disordered breathing as it relates to diabetes. 4. Obesity - weight has been going down since last seen in 2018 (down 10 pounds0. I have discussed the relationship of weight to obstructive sleep apnea. I have advised him to continue a weight loss plan. he understands that weight loss can reduce severity of sleep apnea and snoring.      All of his questions addressed  Electronically signed by    Frederic Ascencio MD  Diplomate in Sleep Medicine  [facility]  2/13/2023

## 2023-02-13 NOTE — PATIENT INSTRUCTIONS
[facility] [place]. [place], [address]  Tel.  [phone]  Fax. [address] [facility] 60  [place], [address]  Tel.  [phone]  Fax. [phone] [address]  Tel.  [phone]  Fax. [phone]     PROPER SLEEP HYGIENE    What to avoid  Do not have drinks with caffeine, such as coffee or black tea, for 8 hours before bed. Do not smoke or use other types of tobacco near bedtime. Nicotine is a stimulant and can keep you awake. Avoid drinking alcohol late in the evening, because it can cause you to wake in the middle of the night. Do not eat a big meal close to bedtime. If you are hungry, eat a light snack. Do not drink a lot of water close to bedtime, because the need to urinate may wake you up during the night. Do not read or watch TV in bed. Use the bed only for sleeping and sexual activity. What to try  Go to bed at the same time every night, and wake up at the same time every morning. Do not take naps during the day. Keep your bedroom quiet, dark, and cool. Get regular exercise, but not within 3 to 4 hours of your bedtime. .  Sleep on a comfortable pillow and mattress. If watching the clock makes you anxious, turn it facing away from you so you cannot see the time. If you worry when you lie down, start a worry book. Well before bedtime, write down your worries, and then set the book and your concerns aside. Try meditation or other relaxation techniques before you go to bed. If you cannot fall asleep, get up and go to another room until you feel sleepy. Do something relaxing. Repeat your bedtime routine before you go to bed again. Make your house quiet and calm about an hour before bedtime. Turn down the lights, turn off the TV, log off the computer, and turn down the volume on music. This can help you relax after a busy day.     Drowsy Driving  The [address] [place] [address] Road Traffic Safety Administration cites drowsiness as a causing factor in more than 172,123 police reported crashes annually, resulting in 76,000 injuries and 1,500 deaths. Other surveys suggest 55% of people polled have driven while drowsy in the past year, 23% had fallen asleep but not crashed, 3% crashed, and 2% had and accident due to drowsy driving. Who is at risk? Young Drivers: One study of drowsy driving accidents states that 55% of the drivers were under 25 years. Of those, 75% were male. Shift Workers and Travelers: People who work overnight or travel across time zones frequently are at higher risk of experiencing Circadian Rhythm Disorders. They are trying to work and function when their body is programed to sleep. Sleep Deprived: Lack of sleep has a serious impact on your ability to pay attention or focus on a task. Consistently getting less than the average of 8 hours your body needs creates partial or cumulative sleep deprivation. Untreated Sleep Disorders: Sleep Apnea, Narcolepsy, R.L.S., and other sleep disorders (untreated) prevent a person from getting enough restful sleep. This leads to excessive daytime sleepiness and increases the risk for drowsy driving accidents by up to 7 times. Medications / Alcohol: Even over the counter medications can cause drowsiness. Medications that impair a drivers attention should have a warning label. Alcohol naturally makes you sleepy and on its own can cause accidents. Combined with excessive drowsiness its effects are amplified. Signs of Drowsy Driving:   * You don't remember driving the last few miles   * You may drift out of your marlee   * You are unable to focus and your thoughts wander   * You may yawn more often than normal   * You have difficulty keeping your eyes open / nodding off   * Missing traffic signs, speeding, or tailgating  Prevention-   Good sleep hygiene, lifestyle and behavioral choices have the most impact on drowsy driving.  There is no substitute for sleep and the average person requires 8 hours nightly. If you find yourself driving drowsy, stop and sleep. Consider the sleep hygiene tips provided during your visit as well. Medication Refill Policy: Refills for all medications require 1 week advance notice. Please have your pharmacy fax a refill request. We are unable to fax, or call in \"controled substance\" medications and you will need to pick these prescriptions up from our office. [employer] Activation    Thank you for requesting access to [employer]. Please follow the instructions below to securely access and download your online medical record. [employer] allows you to send messages to your doctor, view your test results, renew your prescriptions, schedule appointments, and more. How Do I Sign Up? In your internet browser, go to [email or web address]. Click on the First Time User? Click Here link in the Sign In box. You will see the New Member Sign Up page. Enter your [employer] Access Code exactly as it appears below. You will not need to use this code after youve completed the sign-up process. If you do not sign up before the expiration date, you must request a new code. [employer] Access Code: Activation code not generated  Current [employer]ites Status: Active (This is the date your MobileWebsites access code will )    Enter the last four digits of your Social Security Number (xxxx) and Date of Birth (mm/dd/yyyy) as indicated and click Submit. You will be taken to the next sign-up page. Create a MobileWebsites ID. This will be your [employer]bsites login ID and cannot be changed, so think of one that is secure and easy to remember. Create a [employer]ites password. You can change your password at any time. Enter your Password Reset Question and Answer. This can be used at a later time if you forget your password. Enter your e-mail address. You will receive e-mail notification when new information is available in [address] Click Sign Up.  You can now view and download portions of your medical record. Click the [employer] link to download a portable copy of your medical information. Additional Information    If you have questions, please call [phone]. Remember, [employer] is NOT to be used for urgent needs. For medical emergencies, dial 911.

## 2023-02-14 ENCOUNTER — DOCUMENTATION ONLY (OUTPATIENT)
Dept: SLEEP MEDICINE | Age: 63
End: 2023-02-14

## 2023-02-21 ENCOUNTER — DOCUMENTATION ONLY (OUTPATIENT)
Dept: SLEEP MEDICINE | Age: 63
End: 2023-02-21

## 2023-03-02 ENCOUNTER — OFFICE VISIT (OUTPATIENT)
Dept: ENDOCRINOLOGY | Age: 63
End: 2023-03-02
Payer: COMMERCIAL

## 2023-03-02 VITALS
HEART RATE: 70 BPM | HEIGHT: 69 IN | SYSTOLIC BLOOD PRESSURE: 126 MMHG | BODY MASS INDEX: 30.81 KG/M2 | DIASTOLIC BLOOD PRESSURE: 69 MMHG | WEIGHT: 208 LBS

## 2023-03-02 DIAGNOSIS — Z79.4 TYPE 2 DIABETES MELLITUS WITH HYPERGLYCEMIA, WITH LONG-TERM CURRENT USE OF INSULIN (HCC): Primary | ICD-10-CM

## 2023-03-02 DIAGNOSIS — E11.65 TYPE 2 DIABETES MELLITUS WITH HYPERGLYCEMIA, WITH LONG-TERM CURRENT USE OF INSULIN (HCC): Primary | ICD-10-CM

## 2023-03-02 DIAGNOSIS — E78.5 HYPERLIPIDEMIA WITH TARGET LDL LESS THAN 100: ICD-10-CM

## 2023-03-02 DIAGNOSIS — I10 ESSENTIAL HYPERTENSION, BENIGN: ICD-10-CM

## 2023-03-02 PROCEDURE — 3052F HG A1C>EQUAL 8.0%<EQUAL 9.0%: CPT | Performed by: INTERNAL MEDICINE

## 2023-03-02 PROCEDURE — 3074F SYST BP LT 130 MM HG: CPT | Performed by: INTERNAL MEDICINE

## 2023-03-02 PROCEDURE — 3078F DIAST BP <80 MM HG: CPT | Performed by: INTERNAL MEDICINE

## 2023-03-02 PROCEDURE — 99214 OFFICE O/P EST MOD 30 MIN: CPT | Performed by: INTERNAL MEDICINE

## 2023-03-02 RX ORDER — PEN NEEDLE, DIABETIC 30 GX3/16"
NEEDLE, DISPOSABLE MISCELLANEOUS
Qty: 100 PEN NEEDLE | Refills: 3 | Status: SHIPPED | OUTPATIENT
Start: 2023-03-02

## 2023-03-02 RX ORDER — INSULIN GLARGINE 300 U/ML
INJECTION, SOLUTION SUBCUTANEOUS
Qty: 9 ML | Refills: 3 | Status: SHIPPED | OUTPATIENT
Start: 2023-03-02

## 2023-03-02 RX ORDER — ATORVASTATIN CALCIUM 20 MG/1
20 TABLET, FILM COATED ORAL
Qty: 90 TABLET | Refills: 3 | Status: SHIPPED | OUTPATIENT
Start: 2023-03-02

## 2023-03-02 NOTE — PATIENT INSTRUCTIONS
1) Stop the trulicity once a week injection. Stay on the daily jardiance. 2) Restart the once daily toujeo insulin at 25 units every morning. Give this 4 days and if your fasting sugar is staying 100-130 then this is a good dose. If you are over 130 after 4 days, then increase your dose to 28 units daily and if still over 130 after 4 days, then go up by 2 units every 4 days to get to the dose that keeps you between 100-130 in the morning. 3) Your creatinine (kidney test) mk from 1.8 to 1.9 due to higher sugars but your urine protein. 4) Your cholesterol is higher due to diet and having higher sugars. 5) Your blood pressure looks great. 6) Have your labs drawn in 3 months and again prior to visit in 6 months. I will send you a message through [employer] with your lab results in 3 months.

## 2023-03-04 ENCOUNTER — HOSPITAL ENCOUNTER (OUTPATIENT)
Dept: MRI IMAGING | Age: 63
Discharge: HOME | End: 2023-03-04
Payer: COMMERCIAL

## 2023-03-04 DIAGNOSIS — R41.3 MEMORY DISTURBANCE: ICD-10-CM

## 2023-03-04 PROCEDURE — 70553 MRI BRAIN STEM W/O & W/DYE: CPT

## 2023-03-04 PROCEDURE — 74011250636 HC RX REV CODE- 250/636

## 2023-03-04 PROCEDURE — A9576 INJ PROHANCE MULTIPACK: HCPCS

## 2023-03-04 RX ADMIN — GADOTERIDOL 20 ML: 279.3 INJECTION, SOLUTION INTRAVENOUS at 08:01

## 2023-03-08 NOTE — PROGRESS NOTES
Results of the tests were reviewed in MyChart with the patient as follows:    Results of the MRI scan of your brain is normal

## 2023-04-22 DIAGNOSIS — I10 ESSENTIAL HYPERTENSION, BENIGN: ICD-10-CM

## 2023-04-22 DIAGNOSIS — E78.5 HYPERLIPIDEMIA WITH TARGET LDL LESS THAN 100: ICD-10-CM

## 2023-04-22 DIAGNOSIS — Z79.4 TYPE 2 DIABETES MELLITUS WITH HYPERGLYCEMIA, WITH LONG-TERM CURRENT USE OF INSULIN (HCC): Primary | ICD-10-CM

## 2023-04-22 DIAGNOSIS — E11.65 TYPE 2 DIABETES MELLITUS WITH HYPERGLYCEMIA, WITH LONG-TERM CURRENT USE OF INSULIN (HCC): Primary | ICD-10-CM

## 2023-04-24 DIAGNOSIS — E78.5 HYPERLIPIDEMIA WITH TARGET LDL LESS THAN 100: ICD-10-CM

## 2023-04-24 DIAGNOSIS — E11.65 TYPE 2 DIABETES MELLITUS WITH HYPERGLYCEMIA, WITH LONG-TERM CURRENT USE OF INSULIN (HCC): Primary | ICD-10-CM

## 2023-04-24 DIAGNOSIS — I10 ESSENTIAL HYPERTENSION, BENIGN: ICD-10-CM

## 2023-04-24 DIAGNOSIS — Z79.4 TYPE 2 DIABETES MELLITUS WITH HYPERGLYCEMIA, WITH LONG-TERM CURRENT USE OF INSULIN (HCC): Primary | ICD-10-CM

## 2023-05-24 ENCOUNTER — TELEPHONE (OUTPATIENT)
Age: 63
End: 2023-05-24

## 2023-05-24 RX ORDER — INSULIN GLARGINE 300 U/ML
INJECTION, SOLUTION SUBCUTANEOUS
COMMUNITY
Start: 2023-05-24

## 2023-05-24 NOTE — TELEPHONE ENCOUNTER
Pt's wife LVM at 2:16 PM stating her 's blood sugars have been high and she is concerned. Mrs Rizwan Ordonez says she noticed lately her  has been sleeping a lot and has been groggy. She says his blood sugar yesterday was in the 400's and today it was 240. She says there has not been any changes in his medications nor has he been sick. Mrs Rizwan Ordonez states she will admit that her  does not eat well, eating a lot of foods and snacks that contain sugar. She confirmed he is taking Jardiance 10 mg and Toujeo 27 units. Mrs Rizwan Ordonez stated she offered to take pt to the hospital but he refused. She is asking for recommendations.

## 2023-05-24 NOTE — TELEPHONE ENCOUNTER
Called and spoke with pt's wife. She is concerned that he may not be taking his pills as directed and plans on getting him a pill box to help with compliance. She thinks he is taking toujeo 27 units every morning. I advised her to increase the toujeo to 30 units starting tomorrow and have him cut back on the high carb foods and if his sugar is still over 130 in the morning by Monday then to increase the dose to 34 units and if still running high by the end of next week to update me over mychart or phone. She voiced understanding of this plan.

## 2023-06-02 DIAGNOSIS — E11.65 TYPE 2 DIABETES MELLITUS WITH HYPERGLYCEMIA, WITH LONG-TERM CURRENT USE OF INSULIN (HCC): ICD-10-CM

## 2023-06-02 DIAGNOSIS — E78.5 HYPERLIPIDEMIA WITH TARGET LDL LESS THAN 100: ICD-10-CM

## 2023-06-02 DIAGNOSIS — Z79.4 TYPE 2 DIABETES MELLITUS WITH HYPERGLYCEMIA, WITH LONG-TERM CURRENT USE OF INSULIN (HCC): ICD-10-CM

## 2023-06-02 DIAGNOSIS — I10 ESSENTIAL HYPERTENSION, BENIGN: ICD-10-CM

## 2023-06-05 LAB
EST. AVERAGE GLUCOSE BLD GHB EST-MCNC: 335 MG/DL
HBA1C MFR BLD: 13.3 % (ref 4.8–5.6)
REPORT: NORMAL

## 2023-06-06 ENCOUNTER — OFFICE VISIT (OUTPATIENT)
Age: 63
End: 2023-06-06
Payer: COMMERCIAL

## 2023-06-06 VITALS
SYSTOLIC BLOOD PRESSURE: 124 MMHG | DIASTOLIC BLOOD PRESSURE: 70 MMHG | HEART RATE: 67 BPM | BODY MASS INDEX: 29.56 KG/M2 | WEIGHT: 199.6 LBS | HEIGHT: 69 IN

## 2023-06-06 DIAGNOSIS — E78.2 MIXED HYPERLIPIDEMIA: ICD-10-CM

## 2023-06-06 DIAGNOSIS — E11.65 TYPE 2 DIABETES MELLITUS WITH HYPERGLYCEMIA, WITH LONG-TERM CURRENT USE OF INSULIN (HCC): Primary | ICD-10-CM

## 2023-06-06 DIAGNOSIS — I10 ESSENTIAL (PRIMARY) HYPERTENSION: ICD-10-CM

## 2023-06-06 DIAGNOSIS — Z79.4 TYPE 2 DIABETES MELLITUS WITH HYPERGLYCEMIA, WITH LONG-TERM CURRENT USE OF INSULIN (HCC): Primary | ICD-10-CM

## 2023-06-06 PROCEDURE — 99214 OFFICE O/P EST MOD 30 MIN: CPT | Performed by: INTERNAL MEDICINE

## 2023-06-06 PROCEDURE — 3074F SYST BP LT 130 MM HG: CPT | Performed by: INTERNAL MEDICINE

## 2023-06-06 PROCEDURE — 3046F HEMOGLOBIN A1C LEVEL >9.0%: CPT | Performed by: INTERNAL MEDICINE

## 2023-06-06 PROCEDURE — 3078F DIAST BP <80 MM HG: CPT | Performed by: INTERNAL MEDICINE

## 2023-06-06 RX ORDER — FLUTICASONE PROPIONATE 50 MCG
SPRAY, SUSPENSION (ML) NASAL
COMMUNITY
Start: 2023-05-16

## 2023-06-06 RX ORDER — AZELASTINE HYDROCHLORIDE 137 UG/1
SPRAY, METERED NASAL
COMMUNITY
Start: 2023-05-16

## 2023-06-06 RX ORDER — CARVEDILOL 25 MG/1
25 TABLET ORAL 2 TIMES DAILY WITH MEALS
COMMUNITY

## 2023-06-06 RX ORDER — MIRTAZAPINE 7.5 MG/1
3.75 TABLET, FILM COATED ORAL
COMMUNITY
Start: 2023-05-19

## 2023-06-06 RX ORDER — AMLODIPINE BESYLATE 5 MG/1
5 TABLET ORAL DAILY
COMMUNITY
Start: 2023-05-15

## 2023-06-06 NOTE — PATIENT INSTRUCTIONS
1) Tonight to take an additional dose of 6 units of toujeo and starting tomorrow, take 38 units in the morning. Give this 4 days and if by Saturday morning, your fasting sugars are still over 150, then increase by 4 units every 4 days until you get to the dose that keeps you under 150 and stay on this dose. 2) Your creatinine (kidney test) is stable at 1.8 and was 1.8 in Aug 2021. 3) Please let us know if there are any differences between the med list we gave you today and what you are taking at home. 4) Try not to snack on anything with sugar after dinner as this will cause your blood sugar to be higher the next morning. You can try sugar free jello or pudding or raw veggies or nuts as these won't cause your sugar to spike overnight. 5) Give me an update over [email or web address] in 2 weeks the week of June 20th with how your sugars are doing and what dose of toujeo you are on.

## 2023-06-06 NOTE — PROGRESS NOTES
was 1.8 in Aug 2021. 3) Please let us know if there are any differences between the med list we gave you today and what you are taking at home. 4) Try not to snack on anything with sugar after dinner as this will cause your blood sugar to be higher the next morning. You can try sugar free jello or pudding or raw veggies or nuts as these won't cause your sugar to spike overnight. 5) Give me an update over [email or web address] in 2 weeks the week of June 20th with how your sugars are doing and what dose of toujeo you are on.         Return 9/14/23 at 8:50am.        Copy sent to:  Dr. Xu Webster., MD Jason Rowe, PA  Yehuda May, NP

## 2023-06-11 RX ORDER — ALLOPURINOL 100 MG/1
100 TABLET ORAL DAILY
COMMUNITY

## 2023-06-21 RX ORDER — INSULIN GLARGINE 300 U/ML
INJECTION, SOLUTION SUBCUTANEOUS
COMMUNITY
Start: 2023-06-21

## 2023-06-24 RX ORDER — EMPAGLIFLOZIN 10 MG/1
TABLET, FILM COATED ORAL
Qty: 90 TABLET | Refills: 3 | Status: SHIPPED | OUTPATIENT
Start: 2023-06-24

## 2023-06-27 ENCOUNTER — OFFICE VISIT (OUTPATIENT)
Age: 63
End: 2023-06-27
Payer: COMMERCIAL

## 2023-06-27 VITALS
DIASTOLIC BLOOD PRESSURE: 72 MMHG | TEMPERATURE: 98.7 F | HEIGHT: 69 IN | OXYGEN SATURATION: 99 % | BODY MASS INDEX: 29.83 KG/M2 | HEART RATE: 57 BPM | WEIGHT: 201.4 LBS | SYSTOLIC BLOOD PRESSURE: 129 MMHG

## 2023-06-27 DIAGNOSIS — R74.8 ABNORMAL LEVELS OF OTHER SERUM ENZYMES: Primary | ICD-10-CM

## 2023-06-27 PROCEDURE — 91200 LIVER ELASTOGRAPHY: CPT | Performed by: PHYSICIAN ASSISTANT

## 2023-06-27 PROCEDURE — 99214 OFFICE O/P EST MOD 30 MIN: CPT | Performed by: PHYSICIAN ASSISTANT

## 2023-06-27 PROCEDURE — 3078F DIAST BP <80 MM HG: CPT | Performed by: PHYSICIAN ASSISTANT

## 2023-06-27 PROCEDURE — 3074F SYST BP LT 130 MM HG: CPT | Performed by: PHYSICIAN ASSISTANT

## 2023-06-27 ASSESSMENT — PATIENT HEALTH QUESTIONNAIRE - PHQ9
SUM OF ALL RESPONSES TO PHQ QUESTIONS 1-9: 0
SUM OF ALL RESPONSES TO PHQ QUESTIONS 1-9: 0
2. FEELING DOWN, DEPRESSED OR HOPELESS: 0
SUM OF ALL RESPONSES TO PHQ9 QUESTIONS 1 & 2: 0
1. LITTLE INTEREST OR PLEASURE IN DOING THINGS: 0
SUM OF ALL RESPONSES TO PHQ QUESTIONS 1-9: 0
SUM OF ALL RESPONSES TO PHQ QUESTIONS 1-9: 0

## 2023-07-31 RX ORDER — INSULIN GLARGINE 300 U/ML
INJECTION, SOLUTION SUBCUTANEOUS
Qty: 13.5 ML | Refills: 3 | Status: SHIPPED | OUTPATIENT
Start: 2023-07-31

## 2023-08-22 ENCOUNTER — OFFICE VISIT (OUTPATIENT)
Age: 63
End: 2023-08-22
Payer: COMMERCIAL

## 2023-08-22 VITALS
HEART RATE: 78 BPM | DIASTOLIC BLOOD PRESSURE: 80 MMHG | OXYGEN SATURATION: 98 % | TEMPERATURE: 97.6 F | SYSTOLIC BLOOD PRESSURE: 130 MMHG | HEIGHT: 69 IN | BODY MASS INDEX: 30.01 KG/M2 | RESPIRATION RATE: 16 BRPM | WEIGHT: 202.6 LBS

## 2023-08-22 DIAGNOSIS — R41.3 MEMORY DISTURBANCE: Primary | ICD-10-CM

## 2023-08-22 DIAGNOSIS — G47.33 OSA (OBSTRUCTIVE SLEEP APNEA): ICD-10-CM

## 2023-08-22 DIAGNOSIS — F41.9 ANXIETY AND DEPRESSION: ICD-10-CM

## 2023-08-22 DIAGNOSIS — F32.A ANXIETY AND DEPRESSION: ICD-10-CM

## 2023-08-22 PROCEDURE — 3079F DIAST BP 80-89 MM HG: CPT | Performed by: PSYCHIATRY & NEUROLOGY

## 2023-08-22 PROCEDURE — 99215 OFFICE O/P EST HI 40 MIN: CPT | Performed by: PSYCHIATRY & NEUROLOGY

## 2023-08-22 PROCEDURE — 3075F SYST BP GE 130 - 139MM HG: CPT | Performed by: PSYCHIATRY & NEUROLOGY

## 2023-08-22 RX ORDER — PREDNISONE 5 MG/1
TABLET ORAL
COMMUNITY
Start: 2023-05-16

## 2023-08-22 RX ORDER — DULAGLUTIDE 0.75 MG/.5ML
0.75 INJECTION, SOLUTION SUBCUTANEOUS WEEKLY
COMMUNITY
Start: 2021-04-05

## 2023-08-22 RX ORDER — AMOXICILLIN AND CLAVULANATE POTASSIUM 875; 125 MG/1; MG/1
1 TABLET, FILM COATED ORAL 2 TIMES DAILY
COMMUNITY
Start: 2023-05-16

## 2023-08-22 RX ORDER — BLOOD-GLUCOSE METER
EACH MISCELLANEOUS
COMMUNITY

## 2023-08-22 RX ORDER — ESCITALOPRAM OXALATE 20 MG/1
20 TABLET ORAL DAILY
COMMUNITY
Start: 2023-08-11

## 2023-08-22 RX ORDER — PEN NEEDLE, DIABETIC 31 GX5/16"
NEEDLE, DISPOSABLE MISCELLANEOUS
COMMUNITY
Start: 2023-05-31

## 2023-08-22 RX ORDER — FLASH GLUCOSE SCANNING READER
EACH MISCELLANEOUS
COMMUNITY
Start: 2019-05-08

## 2023-08-22 RX ORDER — TRAMADOL HYDROCHLORIDE 50 MG/1
TABLET ORAL EVERY 6 HOURS PRN
COMMUNITY
Start: 2022-08-17

## 2023-08-22 RX ORDER — DICLOFENAC SODIUM 75 MG/1
75 TABLET, DELAYED RELEASE ORAL 2 TIMES DAILY
COMMUNITY

## 2023-08-22 RX ORDER — HYDROQUINONE 40 MG/G
CREAM TOPICAL
COMMUNITY
Start: 2023-08-18

## 2023-08-22 RX ORDER — PEN NEEDLE, DIABETIC 31 GX5/16"
NEEDLE, DISPOSABLE MISCELLANEOUS
COMMUNITY
Start: 2023-03-02

## 2023-08-22 NOTE — PATIENT INSTRUCTIONS
As per discussion your MRI scan of your brain is normal so that looks at the structure so that is great news. Now we need to look at the function and we will get that done with Dr. Adilson Maldonado who is the neuropsychologist    I do recommend you cancel your appointments over at [address] related to the neurology department and your memory because it were doing the same thing    In the meantime continue with activity as tolerated continue to try to exercise and eat a healthy diet to promote brain health    We will have an appointment to see you back in March but once the neuropsych testing comes and I will set you up for a quick virtual visit to go over those results and what of the next steps from our perspective  Dr. Adilson Maldonado will go over those results quite thoroughly with you prior to my virtual visit appointment        Office Policies    Phone calls/patient messages:  Please allow up to 24 hours for someone in the office to contact you about your call or message. Be mindful your provider may be out of the office or your message may require further review. We encourage you to use [employer] for your messages as this is a faster, more efficient way to communicate with our office    Medication Refills:  Prescription medications require up to 48 business hours to process. We encourage you to use [employer] for your refills. For controlled medications: Please allow up to 72 business hours to process. Certain medications may require you to  a written prescription at our office. NO narcotic/controlled medications will be prescribed after 4pm Monday through Friday or on weekends    Form/Paperwork Completion:  We ask that you allow 7-14 business days. You may also download your forms to [employer] to have your doctor print off.

## 2023-08-22 NOTE — ASSESSMENT & PLAN NOTE
Patient has an appointment to see Dr. Adilson Maldonado for neuropsych evaluation in October and have encouraged him to keep that appointment. He was also being seen at [facility] for the same problem patient and wife were not aware that they were being seen for the same issues I reviewed the notes from [facility] along with recommendations work were essentially the same as what we are doing here.     The patient is wife would like to continue to stay under [facility] umbrella so I have asked them to please call [facility] and let them know that they are being followed here and cancel any further appointments    For now I have asked the wife to continue to monitor medications and also keep an eye on finances I have recommended the patient continue activity as tolerated    We briefly discussed going on medication for memory but I would prefer to wait to see whether this is a pseudodementia versus a progressive degenerative process   Patient and wife are in agreement

## 2023-08-22 NOTE — ASSESSMENT & PLAN NOTE
Patient has an upcoming appointment with Dr. Cyntiha Noriega for further evaluation and management of sleep apnea

## 2023-08-22 NOTE — ASSESSMENT & PLAN NOTE
Patient is on Lexapro and seemingly being weaned on the Remeron although patient cannot articulate this for sure    Neuropsych testing is pending so we can determine extent of mood and affect on patient's memory and cognition

## 2023-08-24 DIAGNOSIS — I10 ESSENTIAL HYPERTENSION, BENIGN: ICD-10-CM

## 2023-08-24 DIAGNOSIS — E11.65 TYPE 2 DIABETES MELLITUS WITH HYPERGLYCEMIA, WITH LONG-TERM CURRENT USE OF INSULIN (HCC): ICD-10-CM

## 2023-08-24 DIAGNOSIS — Z79.4 TYPE 2 DIABETES MELLITUS WITH HYPERGLYCEMIA, WITH LONG-TERM CURRENT USE OF INSULIN (HCC): ICD-10-CM

## 2023-08-24 DIAGNOSIS — E78.5 HYPERLIPIDEMIA WITH TARGET LDL LESS THAN 100: ICD-10-CM

## 2023-09-08 LAB
ALBUMIN SERPL-MCNC: 4.5 G/DL (ref 3.9–4.9)
ALBUMIN/GLOB SERPL: 1.4 {RATIO} (ref 1.2–2.2)
ALP SERPL-CCNC: 105 IU/L (ref 44–121)
ALT SERPL-CCNC: 24 IU/L (ref 0–44)
AST SERPL-CCNC: 24 IU/L (ref 0–40)
BILIRUB SERPL-MCNC: 1.1 MG/DL (ref 0–1.2)
BUN SERPL-MCNC: 18 MG/DL (ref 8–27)
BUN/CREAT SERPL: 10 (ref 10–24)
CALCIUM SERPL-MCNC: 9.7 MG/DL (ref 8.6–10.2)
CHLORIDE SERPL-SCNC: 101 MMOL/L (ref 96–106)
CHOLEST SERPL-MCNC: 136 MG/DL (ref 100–199)
CO2 SERPL-SCNC: 24 MMOL/L (ref 20–29)
CREAT SERPL-MCNC: 1.81 MG/DL (ref 0.76–1.27)
EGFRCR SERPLBLD CKD-EPI 2021: 41 ML/MIN/1.73
EST. AVERAGE GLUCOSE BLD GHB EST-MCNC: 237 MG/DL
GLOBULIN SER CALC-MCNC: 3.2 G/DL (ref 1.5–4.5)
GLUCOSE SERPL-MCNC: 144 MG/DL (ref 70–99)
HBA1C MFR BLD: 9.9 % (ref 4.8–5.6)
HDLC SERPL-MCNC: 32 MG/DL
IMP & REVIEW OF LAB RESULTS: NORMAL
LDLC SERPL CALC-MCNC: 65 MG/DL (ref 0–99)
POTASSIUM SERPL-SCNC: 4 MMOL/L (ref 3.5–5.2)
PROT SERPL-MCNC: 7.7 G/DL (ref 6–8.5)
REPORT: NORMAL
SODIUM SERPL-SCNC: 142 MMOL/L (ref 134–144)
TRIGL SERPL-MCNC: 239 MG/DL (ref 0–149)
VLDLC SERPL CALC-MCNC: 39 MG/DL (ref 5–40)

## 2023-09-14 ENCOUNTER — OFFICE VISIT (OUTPATIENT)
Age: 63
End: 2023-09-14

## 2023-09-14 VITALS
BODY MASS INDEX: 30.62 KG/M2 | HEIGHT: 69 IN | WEIGHT: 206.7 LBS | DIASTOLIC BLOOD PRESSURE: 69 MMHG | HEART RATE: 57 BPM | SYSTOLIC BLOOD PRESSURE: 133 MMHG

## 2023-09-14 DIAGNOSIS — E11.65 TYPE 2 DIABETES MELLITUS WITH HYPERGLYCEMIA, WITH LONG-TERM CURRENT USE OF INSULIN (HCC): Primary | ICD-10-CM

## 2023-09-14 DIAGNOSIS — I10 ESSENTIAL (PRIMARY) HYPERTENSION: ICD-10-CM

## 2023-09-14 DIAGNOSIS — E78.2 MIXED HYPERLIPIDEMIA: ICD-10-CM

## 2023-09-14 DIAGNOSIS — Z79.4 TYPE 2 DIABETES MELLITUS WITH HYPERGLYCEMIA, WITH LONG-TERM CURRENT USE OF INSULIN (HCC): Primary | ICD-10-CM

## 2023-09-14 RX ORDER — INSULIN GLARGINE 300 U/ML
INJECTION, SOLUTION SUBCUTANEOUS
Qty: 13.5 ML | Refills: 3
Start: 2023-09-14 | End: 2023-09-14

## 2023-09-14 RX ORDER — INSULIN GLARGINE 300 U/ML
INJECTION, SOLUTION SUBCUTANEOUS
Qty: 22.5 ML | Refills: 3 | Status: SHIPPED | OUTPATIENT
Start: 2023-09-14

## 2023-09-14 NOTE — PROGRESS NOTES
Chief Complaint   Patient presents with    Diabetes     PCP and pharmacy confirmed      History of Present Illness: Shine Sanchez is a 61 y.o. male here for follow up of diabetes. Weight up 7 lbs since last visit in 6/23. His wife is with him today and confirmed his med list.  He has increased his toujeo to 60 units daily as of the past month and fasting sugars are 130-150. He doesn't tend to scan his [place] consistently in the afternoon or evening and advised him to start doing this. Review of his [place] data over the past 90 days shows an avg of 210 with only 38% time in range and the rest high. Still drinks regular soda and sweet tea on occasion. Can still have some sour stomach despite being off the trulicity and on omeprazole. Compliant with BP and lipid regimen. Current Outpatient Medications   Medication Sig    insulin glargine, 1 unit dial, (TOUJEO SOLOSTAR) 300 UNIT/ML concentrated injection pen INJECT 60 UNITS UNDER THE SKIN ONCE DAILY--Dose change 09/14/23--updated med list--did not send prescription to the pharmacy    escitalopram (LEXAPRO) 20 MG tablet Take 1 tablet by mouth daily    hydroquinone 4 % cream APPLY TO AFFECTED AREA TO DARK SPOTS FACE TWICE A DAY    traMADol (ULTRAM) 50 MG tablet Take by mouth every 6 hours as needed.     B-D ULTRAFINE III SHORT PEN 31G X 8 MM MISC     Continuous Blood Gluc  (FREESTYLE BARI 14 DAY READER) ADRIENNE Use as directed    Insulin Pen Needle (B-D ULTRAFINE III SHORT PEN) 31G X 8 MM MISC USE FOR INJECTING INSULIN ONCE DAILY    Blood Glucose Monitoring Suppl ([employer] BLOOD GLUCOSE METER) w/Device KIT by Does not apply route    JARDIANCE 10 MG tablet TAKE 1 TABLET BY MOUTH EVERY DAY    allopurinol (ZYLOPRIM) 100 MG tablet Take 1 tablet by mouth daily    carvedilol (COREG) 25 MG tablet Take 1 tablet by mouth 2 times daily (with meals)    amLODIPine (NORVASC) 5 MG tablet Take 1 tablet by mouth daily    Azelastine HCl 137 MCG/SPRAY SOLN SPRAY 2

## 2023-09-14 NOTE — PATIENT INSTRUCTIONS
1) Your Hemoglobin A1c (3 month test of blood sugar) has improved greatly from 13.3% to 9.9% but we want to try and get this closer to 7% or less. 2) Try to scan the kath 5-10 min before and 2 hours after you eat so you can see the effect of food on your sugars. Do your best to focus on the meals that are causing you to spike over 180 when checked 2 hours after a meal and limit your portions of these foods. 3) Your creatinine (kidney test) is stable. 4) Your cholesterol is much improved. 5) Your blood pressure is controlled. 6) Increase the toujeo to 65 units in the morning and see if this keeps your morning sugars consistently under 130 and your after meal readings under 180. If you are still not at these goal after a week, then go to 70 units of toujeo. 7) The key to losing weight is less circulating insulin as the more insulin that circulates in your blood, the harder it is to burn belly fat. Every time you eat or drink anything with sugar, your pancreas produces more insulin and this will lead to more difficulty losing weight so the more time you spend in a fasting state, the better you will be able to lose weight and when you do eat, you want to limit your carbohydrate intake as best as possible. Try intermittent fasting where you eat a meal at noon and another one at 6pm and then don't eat any food for 18 hours (or pick another 6 hour window that works for you). If you get hungry instead of eating, try drinking 8-12 oz of water as often your brain cannot tell the difference between hunger and thirst.    If you absolutely can't resist your hunger, then only have protein like a slice of cheese or deli meat or handful of almonds or 1 teaspoon of peanut butter or a hard boiled egg or try sugar free jello or pudding or raw veggies.     Try not to eat more than 45 grams of carbs for your 2 meals (1 palm/fist sized serving = 30 grams; carbs are potatoes, rice, pasta, bread, fruit, corn,

## 2023-10-02 ENCOUNTER — HOSPITAL ENCOUNTER (OUTPATIENT)
Facility: HOSPITAL | Age: 63
Discharge: HOME | End: 2023-10-05

## 2023-10-02 VITALS
BODY MASS INDEX: 29.92 KG/M2 | TEMPERATURE: 98.1 F | HEART RATE: 66 BPM | DIASTOLIC BLOOD PRESSURE: 73 MMHG | WEIGHT: 202 LBS | SYSTOLIC BLOOD PRESSURE: 131 MMHG | HEIGHT: 69 IN

## 2023-10-02 NOTE — PERIOP NOTE
recommended in the hospital, but not required. Once your procedure and the immediate recovery period is completed, a nurse in the recovery area will contact your designated visitor to inform them of your room number or to otherwise review other pertinent information regarding your care. Social distancing practices are strongly encouraged in waiting areas and the cafeteria. The patient was contacted in person. He verbalized understanding of all instructions does not need reinforcement.

## 2023-10-09 ENCOUNTER — ANESTHESIA EVENT (OUTPATIENT)
Facility: HOSPITAL | Age: 63
End: 2023-10-09
Payer: COMMERCIAL

## 2023-10-09 ENCOUNTER — ANESTHESIA (OUTPATIENT)
Facility: HOSPITAL | Age: 63
End: 2023-10-09
Payer: COMMERCIAL

## 2023-10-09 ENCOUNTER — HOSPITAL ENCOUNTER (OUTPATIENT)
Facility: HOSPITAL | Age: 63
Setting detail: OUTPATIENT SURGERY
Discharge: HOME | End: 2023-10-09
Attending: OTOLARYNGOLOGY | Admitting: OTOLARYNGOLOGY
Payer: COMMERCIAL

## 2023-10-09 VITALS
SYSTOLIC BLOOD PRESSURE: 143 MMHG | OXYGEN SATURATION: 92 % | DIASTOLIC BLOOD PRESSURE: 74 MMHG | HEART RATE: 69 BPM | BODY MASS INDEX: 29.62 KG/M2 | RESPIRATION RATE: 16 BRPM | TEMPERATURE: 97.8 F | WEIGHT: 200 LBS | HEIGHT: 69 IN

## 2023-10-09 LAB
GLUCOSE BLD STRIP.AUTO-MCNC: 143 MG/DL (ref 65–117)
GLUCOSE BLD STRIP.AUTO-MCNC: 146 MG/DL (ref 65–117)
SERVICE CMNT-IMP: ABNORMAL
SERVICE CMNT-IMP: ABNORMAL

## 2023-10-09 PROCEDURE — 7100000000 HC PACU RECOVERY - FIRST 15 MIN: Performed by: OTOLARYNGOLOGY

## 2023-10-09 PROCEDURE — 87102 FUNGUS ISOLATION CULTURE: CPT

## 2023-10-09 PROCEDURE — 6360000002 HC RX W HCPCS: Performed by: NURSE ANESTHETIST, CERTIFIED REGISTERED

## 2023-10-09 PROCEDURE — 6360000002 HC RX W HCPCS: Performed by: ANESTHESIOLOGY

## 2023-10-09 PROCEDURE — 87205 SMEAR GRAM STAIN: CPT

## 2023-10-09 PROCEDURE — 3700000000 HC ANESTHESIA ATTENDED CARE: Performed by: OTOLARYNGOLOGY

## 2023-10-09 PROCEDURE — 3600000004 HC SURGERY LEVEL 4 BASE: Performed by: OTOLARYNGOLOGY

## 2023-10-09 PROCEDURE — 6370000000 HC RX 637 (ALT 250 FOR IP): Performed by: OTOLARYNGOLOGY

## 2023-10-09 PROCEDURE — 2709999900 HC NON-CHARGEABLE SUPPLY: Performed by: OTOLARYNGOLOGY

## 2023-10-09 PROCEDURE — 88312 SPECIAL STAINS GROUP 1: CPT

## 2023-10-09 PROCEDURE — 2580000003 HC RX 258: Performed by: NURSE ANESTHETIST, CERTIFIED REGISTERED

## 2023-10-09 PROCEDURE — 87075 CULTR BACTERIA EXCEPT BLOOD: CPT

## 2023-10-09 PROCEDURE — 87070 CULTURE OTHR SPECIMN AEROBIC: CPT

## 2023-10-09 PROCEDURE — 3700000001 HC ADD 15 MINUTES (ANESTHESIA): Performed by: OTOLARYNGOLOGY

## 2023-10-09 PROCEDURE — 82962 GLUCOSE BLOOD TEST: CPT

## 2023-10-09 PROCEDURE — 3600000014 HC SURGERY LEVEL 4 ADDTL 15MIN: Performed by: OTOLARYNGOLOGY

## 2023-10-09 PROCEDURE — 2500000003 HC RX 250 WO HCPCS: Performed by: OTOLARYNGOLOGY

## 2023-10-09 PROCEDURE — 2720000010 HC SURG SUPPLY STERILE: Performed by: OTOLARYNGOLOGY

## 2023-10-09 PROCEDURE — 7100000001 HC PACU RECOVERY - ADDTL 15 MIN: Performed by: OTOLARYNGOLOGY

## 2023-10-09 PROCEDURE — 2500000003 HC RX 250 WO HCPCS: Performed by: NURSE ANESTHETIST, CERTIFIED REGISTERED

## 2023-10-09 PROCEDURE — 88305 TISSUE EXAM BY PATHOLOGIST: CPT

## 2023-10-09 RX ORDER — OXYMETAZOLINE HYDROCHLORIDE 0.05 G/100ML
SPRAY NASAL PRN
Status: DISCONTINUED | OUTPATIENT
Start: 2023-10-09 | End: 2023-10-09 | Stop reason: HOSPADM

## 2023-10-09 RX ORDER — SODIUM CHLORIDE 0.9 % (FLUSH) 0.9 %
5-40 SYRINGE (ML) INJECTION EVERY 12 HOURS SCHEDULED
Status: DISCONTINUED | OUTPATIENT
Start: 2023-10-09 | End: 2023-10-09 | Stop reason: HOSPADM

## 2023-10-09 RX ORDER — DEXAMETHASONE SODIUM PHOSPHATE 4 MG/ML
INJECTION, SOLUTION INTRA-ARTICULAR; INTRALESIONAL; INTRAMUSCULAR; INTRAVENOUS; SOFT TISSUE PRN
Status: DISCONTINUED | OUTPATIENT
Start: 2023-10-09 | End: 2023-10-09 | Stop reason: SDUPTHER

## 2023-10-09 RX ORDER — FENTANYL CITRATE 50 UG/ML
25 INJECTION, SOLUTION INTRAMUSCULAR; INTRAVENOUS EVERY 5 MIN PRN
Status: DISCONTINUED | OUTPATIENT
Start: 2023-10-09 | End: 2023-10-09 | Stop reason: HOSPADM

## 2023-10-09 RX ORDER — ROCURONIUM BROMIDE 10 MG/ML
INJECTION, SOLUTION INTRAVENOUS PRN
Status: DISCONTINUED | OUTPATIENT
Start: 2023-10-09 | End: 2023-10-09 | Stop reason: SDUPTHER

## 2023-10-09 RX ORDER — CEFAZOLIN SODIUM 1 G/3ML
INJECTION, POWDER, FOR SOLUTION INTRAMUSCULAR; INTRAVENOUS PRN
Status: DISCONTINUED | OUTPATIENT
Start: 2023-10-09 | End: 2023-10-09 | Stop reason: SDUPTHER

## 2023-10-09 RX ORDER — HYDRALAZINE HYDROCHLORIDE 20 MG/ML
10 INJECTION INTRAMUSCULAR; INTRAVENOUS
Status: DISCONTINUED | OUTPATIENT
Start: 2023-10-09 | End: 2023-10-09 | Stop reason: HOSPADM

## 2023-10-09 RX ORDER — FENTANYL CITRATE 50 UG/ML
100 INJECTION, SOLUTION INTRAMUSCULAR; INTRAVENOUS
Status: DISCONTINUED | OUTPATIENT
Start: 2023-10-09 | End: 2023-10-09 | Stop reason: HOSPADM

## 2023-10-09 RX ORDER — SODIUM CHLORIDE 9 MG/ML
INJECTION, SOLUTION INTRAVENOUS PRN
Status: DISCONTINUED | OUTPATIENT
Start: 2023-10-09 | End: 2023-10-09 | Stop reason: HOSPADM

## 2023-10-09 RX ORDER — LIDOCAINE HYDROCHLORIDE 10 MG/ML
1 INJECTION, SOLUTION INFILTRATION; PERINEURAL
Status: DISCONTINUED | OUTPATIENT
Start: 2023-10-09 | End: 2023-10-09 | Stop reason: HOSPADM

## 2023-10-09 RX ORDER — FENTANYL CITRATE 50 UG/ML
INJECTION, SOLUTION INTRAMUSCULAR; INTRAVENOUS PRN
Status: DISCONTINUED | OUTPATIENT
Start: 2023-10-09 | End: 2023-10-09 | Stop reason: SDUPTHER

## 2023-10-09 RX ORDER — SODIUM CHLORIDE, SODIUM LACTATE, POTASSIUM CHLORIDE, CALCIUM CHLORIDE 600; 310; 30; 20 MG/100ML; MG/100ML; MG/100ML; MG/100ML
INJECTION, SOLUTION INTRAVENOUS CONTINUOUS PRN
Status: DISCONTINUED | OUTPATIENT
Start: 2023-10-09 | End: 2023-10-09 | Stop reason: SDUPTHER

## 2023-10-09 RX ORDER — LIDOCAINE HYDROCHLORIDE 20 MG/ML
INJECTION, SOLUTION EPIDURAL; INFILTRATION; INTRACAUDAL; PERINEURAL PRN
Status: DISCONTINUED | OUTPATIENT
Start: 2023-10-09 | End: 2023-10-09 | Stop reason: SDUPTHER

## 2023-10-09 RX ORDER — PHENYLEPHRINE HCL IN 0.9% NACL 0.4MG/10ML
SYRINGE (ML) INTRAVENOUS PRN
Status: DISCONTINUED | OUTPATIENT
Start: 2023-10-09 | End: 2023-10-09 | Stop reason: SDUPTHER

## 2023-10-09 RX ORDER — PROCHLORPERAZINE EDISYLATE 5 MG/ML
5 INJECTION INTRAMUSCULAR; INTRAVENOUS
Status: DISCONTINUED | OUTPATIENT
Start: 2023-10-09 | End: 2023-10-09 | Stop reason: HOSPADM

## 2023-10-09 RX ORDER — OXYCODONE HYDROCHLORIDE 5 MG/1
5 TABLET ORAL
Status: DISCONTINUED | OUTPATIENT
Start: 2023-10-09 | End: 2023-10-09 | Stop reason: HOSPADM

## 2023-10-09 RX ORDER — MIDAZOLAM HYDROCHLORIDE 2 MG/2ML
2 INJECTION, SOLUTION INTRAMUSCULAR; INTRAVENOUS
Status: DISCONTINUED | OUTPATIENT
Start: 2023-10-09 | End: 2023-10-09 | Stop reason: HOSPADM

## 2023-10-09 RX ORDER — MIDAZOLAM HYDROCHLORIDE 1 MG/ML
INJECTION INTRAMUSCULAR; INTRAVENOUS PRN
Status: DISCONTINUED | OUTPATIENT
Start: 2023-10-09 | End: 2023-10-09 | Stop reason: SDUPTHER

## 2023-10-09 RX ORDER — LIDOCAINE HYDROCHLORIDE AND EPINEPHRINE 10; 10 MG/ML; UG/ML
INJECTION, SOLUTION INFILTRATION; PERINEURAL PRN
Status: DISCONTINUED | OUTPATIENT
Start: 2023-10-09 | End: 2023-10-09 | Stop reason: HOSPADM

## 2023-10-09 RX ORDER — ACETAMINOPHEN 500 MG
1000 TABLET ORAL ONCE
Status: DISCONTINUED | OUTPATIENT
Start: 2023-10-09 | End: 2023-10-09 | Stop reason: HOSPADM

## 2023-10-09 RX ORDER — HYDROMORPHONE HYDROCHLORIDE 1 MG/ML
0.5 INJECTION, SOLUTION INTRAMUSCULAR; INTRAVENOUS; SUBCUTANEOUS EVERY 5 MIN PRN
Status: DISCONTINUED | OUTPATIENT
Start: 2023-10-09 | End: 2023-10-09 | Stop reason: HOSPADM

## 2023-10-09 RX ORDER — FENTANYL CITRATE 50 UG/ML
25 INJECTION, SOLUTION INTRAMUSCULAR; INTRAVENOUS
Status: DISCONTINUED | OUTPATIENT
Start: 2023-10-09 | End: 2023-10-09

## 2023-10-09 RX ORDER — ONDANSETRON 2 MG/ML
4 INJECTION INTRAMUSCULAR; INTRAVENOUS
Status: DISCONTINUED | OUTPATIENT
Start: 2023-10-09 | End: 2023-10-09 | Stop reason: HOSPADM

## 2023-10-09 RX ORDER — GLYCOPYRROLATE 0.2 MG/ML
INJECTION, SOLUTION INTRAMUSCULAR; INTRAVENOUS PRN
Status: DISCONTINUED | OUTPATIENT
Start: 2023-10-09 | End: 2023-10-09 | Stop reason: SDUPTHER

## 2023-10-09 RX ORDER — ONDANSETRON 2 MG/ML
INJECTION INTRAMUSCULAR; INTRAVENOUS PRN
Status: DISCONTINUED | OUTPATIENT
Start: 2023-10-09 | End: 2023-10-09 | Stop reason: SDUPTHER

## 2023-10-09 RX ORDER — SODIUM CHLORIDE, SODIUM LACTATE, POTASSIUM CHLORIDE, CALCIUM CHLORIDE 600; 310; 30; 20 MG/100ML; MG/100ML; MG/100ML; MG/100ML
INJECTION, SOLUTION INTRAVENOUS CONTINUOUS
Status: DISCONTINUED | OUTPATIENT
Start: 2023-10-09 | End: 2023-10-09 | Stop reason: HOSPADM

## 2023-10-09 RX ORDER — SUCCINYLCHOLINE CHLORIDE 20 MG/ML
INJECTION INTRAMUSCULAR; INTRAVENOUS PRN
Status: DISCONTINUED | OUTPATIENT
Start: 2023-10-09 | End: 2023-10-09 | Stop reason: SDUPTHER

## 2023-10-09 RX ORDER — SODIUM CHLORIDE 0.9 % (FLUSH) 0.9 %
5-40 SYRINGE (ML) INJECTION PRN
Status: DISCONTINUED | OUTPATIENT
Start: 2023-10-09 | End: 2023-10-09 | Stop reason: HOSPADM

## 2023-10-09 RX ADMIN — Medication 200 MCG: at 11:32

## 2023-10-09 RX ADMIN — SUCCINYLCHOLINE CHLORIDE 120 MG: 20 INJECTION, SOLUTION INTRAMUSCULAR; INTRAVENOUS at 11:15

## 2023-10-09 RX ADMIN — ONDANSETRON 4 MG: 2 INJECTION INTRAMUSCULAR; INTRAVENOUS at 11:54

## 2023-10-09 RX ADMIN — MIDAZOLAM 2 MG: 1 INJECTION INTRAMUSCULAR; INTRAVENOUS at 11:12

## 2023-10-09 RX ADMIN — LIDOCAINE HYDROCHLORIDE 100 MG: 20 INJECTION, SOLUTION EPIDURAL; INFILTRATION; INTRACAUDAL; PERINEURAL at 11:15

## 2023-10-09 RX ADMIN — PHENYLEPHRINE HYDROCHLORIDE 40 MCG/MIN: 10 INJECTION INTRAVENOUS at 11:28

## 2023-10-09 RX ADMIN — CEFAZOLIN 2 G: 330 INJECTION, POWDER, FOR SOLUTION INTRAMUSCULAR; INTRAVENOUS at 11:25

## 2023-10-09 RX ADMIN — FENTANYL CITRATE 50 MCG: 50 INJECTION, SOLUTION INTRAMUSCULAR; INTRAVENOUS at 12:02

## 2023-10-09 RX ADMIN — Medication 200 MCG: at 11:52

## 2023-10-09 RX ADMIN — GLYCOPYRROLATE 0.2 MG: 0.2 INJECTION, SOLUTION INTRAMUSCULAR; INTRAVENOUS at 11:34

## 2023-10-09 RX ADMIN — SODIUM CHLORIDE, POTASSIUM CHLORIDE, SODIUM LACTATE AND CALCIUM CHLORIDE: 600; 310; 30; 20 INJECTION, SOLUTION INTRAVENOUS at 11:12

## 2023-10-09 RX ADMIN — PROPOFOL 150 MG: 10 INJECTION, EMULSION INTRAVENOUS at 11:15

## 2023-10-09 RX ADMIN — FENTANYL CITRATE 50 MCG: 50 INJECTION, SOLUTION INTRAMUSCULAR; INTRAVENOUS at 11:15

## 2023-10-09 RX ADMIN — FENTANYL CITRATE 25 MCG: 50 INJECTION, SOLUTION INTRAMUSCULAR; INTRAVENOUS at 12:49

## 2023-10-09 RX ADMIN — DEXAMETHASONE SODIUM PHOSPHATE 4 MG: 4 INJECTION, SOLUTION INTRAMUSCULAR; INTRAVENOUS at 11:15

## 2023-10-09 RX ADMIN — ROCURONIUM BROMIDE 5 MG: 10 SOLUTION INTRAVENOUS at 11:15

## 2023-10-09 ASSESSMENT — PAIN SCALES - GENERAL
PAINLEVEL_OUTOF10: 8
PAINLEVEL_OUTOF10: 0

## 2023-10-09 ASSESSMENT — PAIN - FUNCTIONAL ASSESSMENT: PAIN_FUNCTIONAL_ASSESSMENT: 0-10

## 2023-10-09 NOTE — ANESTHESIA PRE PROCEDURE
Department of Anesthesiology  Preprocedure Note       Name:  Gagandeep Heller   Age:  61 y.o.  :  1960                                          MRN:  [id number]         Date:  10/9/2023      Surgeon: Paras De Jesus):  Leslye Cai MD    Procedure: Procedure(s):  SINUS ENDOSCOPY SURGERY - SPHENOIDOTOMY LEFT, RESECTION OF TURBINATE BONES, STEREOTACTIC COMPUTER ASSISTED PROCEDURE CRANIAL EXTRADURAL    Medications prior to admission:   Prior to Admission medications    Medication Sig Start Date End Date Taking?  Authorizing Provider   insulin glargine, 1 unit dial, (TOUJEO SOLOSTAR) 300 UNIT/ML concentrated injection pen INJECT 65 UNITS UNDER THE SKIN ONCE DAILY OR AS DIRECTED UP TO 75 UNITS DAILY 23   Itzel Shrestha MD   escitalopram (LEXAPRO) 20 MG tablet Take 1 tablet by mouth as needed 23   Dheeraj Patel MD   hydroquinone 4 % cream APPLY TO AFFECTED AREA TO DARK SPOTS FACE TWICE A DAY  Patient not taking: Reported on 10/9/2023 8/18/23   Dheeraj Patel MD   B-HECTOR ULTRAFINE III SHORT PEN 31G X 8 MM MISC  23   Dheeraj Patel MD   Continuous Blood Gluc  (FREESTYLE BARI 14 DAY READER) ADRIENNE Use as directed 19   Dheeraj Patel MD   Insulin Pen Needle (B-D ULTRAFINE III SHORT PEN) 31G X 8 MM MISC USE FOR INJECTING INSULIN ONCE DAILY 3/2/23   Dheeraj Patel MD   Blood Glucose Monitoring Suppl ([employer] BLOOD GLUCOSE METER) w/Device KIT by Does not apply route    Dheeraj Patel MD   JARDIANCE 10 MG tablet TAKE 1 TABLET BY MOUTH EVERY DAY 23   Itzel Shrestha MD   allopurinol (ZYLOPRIM) 100 MG tablet Take 1 tablet by mouth daily    Dheeraj Patel MD   carvedilol (COREG) 25 MG tablet Take 1 tablet by mouth 2 times daily (with meals)    Dheeraj Patel MD   amLODIPine (NORVASC) 5 MG tablet Take 1 tablet by mouth daily 5/15/23   Dheeraj Patel MD   Azelastine HCl 137 MCG/SPRAY SOLN SPRAY 2 SPRAYS INTO EACH NOSTRIL EVERY DAY

## 2023-10-09 NOTE — ANESTHESIA POSTPROCEDURE EVALUATION
Department of Anesthesiology  Postprocedure Note    Patient: Sherie Fostre  MRN: [id number]  YOB: 1960  Date of evaluation: 10/9/2023      Procedure Summary     Date: 10/09/23 Room / Location: [facility] ASU A1 / [facility] AMBULATORY OR    Anesthesia Start: 1112 Anesthesia Stop: 1217    Procedure: IMAGE GUIDED SINUS ENDOSCOPY SURGERY - SPHENOIDOTOMY LEFT WITH REMOVAL OF DISEASED TISSUE AND BILATERAL INFERIOR TURBINATE REDUCTION (Face) Diagnosis:       Chronic sphenoidal sinusitis      Hypertrophy of nasal turbinates      (Chronic sphenoidal sinusitis [J32.3])      (Hypertrophy of nasal turbinates [J34.3])    Surgeons: Ena Hudson MD Responsible Provider: Safia Lynn MD    Anesthesia Type: General ASA Status: 3          Anesthesia Type: General    Louisa Phase I: Louisa Score: 9    Louisa Phase II:        Anesthesia Post Evaluation    Patient location during evaluation: PACU  Patient participation: complete - patient participated  Level of consciousness: awake  Pain score: 2  Airway patency: patent  Nausea & Vomiting: no nausea  Complications: no  Cardiovascular status: blood pressure returned to baseline  Respiratory status: acceptable  Hydration status: euvolemic  Pain management: adequate

## 2023-10-09 NOTE — DISCHARGE INSTRUCTIONS
[address] [place] [address]    The following instructions are designed to help you recover from surgery as easily as possible. Taking care of yourself can prevent complications. It is very important that you read this sheet often and follow the instructions carefully while you are at home. Your doctor or nurse will be happy to answer any questions. DIET:    You may resume your normal diet. It is important to remember that good overall diet and health promotes healing. ACTIVITY RESTRICTIONS:    The following guidelines should be followed until your doctor tells you otherwise:    Do not lift anything over five pounds. Do not bend over. Avoid doing things like tying your shoes or picking things up off the floor. Do not do heavy exercise or play contact sports. Do not strain for a bowel movement. If you are constipated, take a stool softener or a gentle laxative. Go back to work or school only when your doctor says you can. Do not blow your nose and if you have to sneeze, sneeze with your mouth open. HOW TO TAKE CARE OF YOUR NOSE AND SINUSES:    You may have some bloody thick mucus drainage from the nose. It will gradually go away. Applying a small gauze dressing beneath your nose will help absorb drainage. After a few days you will probably not need to use the dressing any longer. If you have a bloody saturated gauze more than once an hour, call the office. You may have some swelling of your nose, upper lip, cheeks or around your eyes for several days after surgery. This swelling will gradually go away. You can help to reduce it by sleeping with your head elevated on two or three pillows and by staying in an upright position as much as possible during your waking hours. Avoid smoke, dust, fumes or anything else that might irritate your nose.     Protect yourself from any unexpected injury to your nose or face, such as being not able to reach our [facility] service through this main number you may call them directly at [phone].

## 2023-10-09 NOTE — H&P
Ears/Nose/Throat History and Physical      History of Present Illness:   Patient is a 61 y.o.  male who is being admitted for elective surgery: sinus surgery    Past Medical History:   Diagnosis Date    Anxiety and depression 2/10/2023    Arthritis     Diabetes ([address]) 2009    GERD (gastroesophageal reflux disease)     Gout     History of abdominal hernia     Hyperlipidemia LDL goal < 100 03/05/2013    Hypertension     CARLTON on CPAP     Renal insufficiency 04/07/2011    STAGE 3 PER NEPHROLOGIST IN 2023      Family History   Problem Relation Age of Onset    Arthritis Mother     Diabetes Father     Cancer Father         prostate    Hypertension Father     Diabetes Sister     Other Brother         [facility]    Anesth Problems Neg Hx       Social History     Tobacco Use    Smoking status: Never    Smokeless tobacco: Never   Substance Use Topics    Alcohol use: No     Alcohol/week: 0.0 standard drinks of alcohol     Past Surgical History:   Procedure Laterality Date    CATARACT REMOVAL Bilateral     COLONOSCOPY      ENDOSCOPY, COLON, DIAGNOSTIC      HEENT      ingrown removed from back of scalp    ROTATOR CUFF REPAIR      SHOULDER ARTHROSCOPY Bilateral       No current facility-administered medications for this encounter.      Current Outpatient Medications   Medication Sig    insulin glargine, 1 unit dial, (TOUJEO SOLOSTAR) 300 UNIT/ML concentrated injection pen INJECT 65 UNITS UNDER THE SKIN ONCE DAILY OR AS DIRECTED UP TO 75 UNITS DAILY    escitalopram (LEXAPRO) 20 MG tablet Take 1 tablet by mouth as needed    hydroquinone 4 % cream APPLY TO AFFECTED AREA TO DARK SPOTS FACE TWICE A DAY    B-D ULTRAFINE III SHORT PEN 31G X 8 MM MISC     Continuous Blood Gluc  (FREESTYLE BARI 14 DAY READER) ADRIENNE Use as directed    Insulin Pen Needle (B-D ULTRAFINE III SHORT PEN) 31G X 8 MM MISC USE FOR INJECTING INSULIN ONCE DAILY    Blood Glucose Monitoring Suppl ([employer] BLOOD GLUCOSE METER) Naveed Tee MD     October 9, 2023

## 2023-10-09 NOTE — OP NOTE
NAME: Silke Merida  MRN: [id number]  DATE: 10/9/2023      PREOPERATIVE DIAGNOSIS: Chronic sphenoidal sinusitis [J32.3]  Hypertrophy of nasal turbinates [J34.3]  POSTOPERATIVE DIAGNOSIS: Same    PROCEDURES PERFORMED:  Bilateral Inferior turbinate reduction, image guided surgery, left endoscopic sphenoid sinusotomy with removal of diseased tissue. SURGEON: Rodger Siddiqi MD    Anesthesia: General    Complications: none    Findings: chronic sinusitis, inferior turbinate hypertrophy    Specimens: left sphenoid sinus  contents. ASSISTANT: None. INDICATIONS FOR SURGERY:  Chronic left sphenoid sinusitis and inferior turbinate hypertrophy      PROCEDURE DETAILS:   After informed consent was obtained from the patient,  the patient was identified, brought to the operating room, and placed  supine on the operating table. General endotracheal anesthesia was given. The nasal cavities were decongested with topical Afrin, which was placed on  Neuro Patties in the left and right nasal cavity. The patient was then  prepped and draped in the standard surgical fashion. A pre-procedural time out was done. Next, 4 mL of 1%  lidocaine with 1:100,000 epinephrine was injected in the left nasal cavity at  the junction of the middle turbinate and the uncinate process as well as on the sphenoethmoidal recess. Then, the patient was repacked with Afrin pledgets and  allowed to have 10 minutes for the anesthetic and hemostatic effects of the  local anesthetic to work. At this point the image guidance system was set up and registered. The left nasal cavity was examined with a 30 degree endoscope. The middle turbinate was lateralized and the 30 degree scope was advanced low in the nasal cavity to be able to see the sphenoethmoidal recess. The left sphenoid ostia was identified and was cannulated with an image guided balloon and dilated.   Next, the ostia was expanded medially and slightly inferiorly with a straight mushroom punch. This provided me with a large sphenoid ostia. The camera was advanced into the sphenoid sinus. There was debris consistent with mycetoma. This was biopsied and sent for bacterial and fungal culture. Next, the sphenoid cavity was irrigated out with saline to flush the remaining debris from the sinus. The sinus cavity was examined again to verify that it was clear. Next, the middle turbinate was medialized with the freer elevator. Next, I proceeded with the turbinate cautery. The patient had a  very large inferior turbinate, and so the Coblation device wand was used to  make a very small, 2-mm incision in the anterior middle turbinate, and then  3 passes were made bluntly along the inferior turbinate submucosally. The  bone and soft tissue were ablated using the Coblation device along these 3  tracts. Special attention was then made at ablating the very anterior head  of the inferior turbinate. Next, the inferior turbinate was outfractured  using a Taylor elevator. This reduced the volume of the inferior  turbinate by approximately 50%. Next, the procedure was done on the left side, where the inferior turbinate  had a small incision made with the Coblation device. Three passes were  made submucosally along the bone. The bone and submucosal area were  ablated using the Coblation device on the setting of 6 in these 3 passes,  and then additional Coblation was done on the head of the inferior  turbinate, and then the turbinate was outfractured using a Taylor  elevator. This reduced the volume of the turbinate by 50%. Next, the nasal cavities were irrigated with copious amounts of normal  saline bilaterally. The patient was turned back over to  the Anesthesia staff, awakened in the operating room and transferred to the  recovery room, awake and alert, and in very good condition.       EBL: <10ml        Alferdito Nugent MD  10/9/2023  12:02 PM

## 2023-10-13 ENCOUNTER — OFFICE VISIT (OUTPATIENT)
Age: 63
End: 2023-10-13

## 2023-10-13 DIAGNOSIS — R41.3 MEMORY LOSS: ICD-10-CM

## 2023-10-13 DIAGNOSIS — F32.1 CURRENT MODERATE EPISODE OF MAJOR DEPRESSIVE DISORDER WITHOUT PRIOR EPISODE (HCC): Primary | ICD-10-CM

## 2023-10-13 DIAGNOSIS — F41.1 GENERALIZED ANXIETY DISORDER: ICD-10-CM

## 2023-10-13 LAB
BACTERIA SPEC CULT: NORMAL
SERVICE CMNT-IMP: NORMAL

## 2023-10-14 LAB
BACTERIA SPEC CULT: ABNORMAL
GRAM STN SPEC: ABNORMAL
GRAM STN SPEC: ABNORMAL
SERVICE CMNT-IMP: ABNORMAL
SERVICE CMNT-IMP: ABNORMAL

## 2023-10-16 LAB
BACTERIA SPEC CULT: ABNORMAL
GRAM STN SPEC: ABNORMAL
GRAM STN SPEC: ABNORMAL
SERVICE CMNT-IMP: ABNORMAL

## 2023-10-17 RX ORDER — INSULIN GLARGINE 300 U/ML
INJECTION, SOLUTION SUBCUTANEOUS
Qty: 22.5 ML | Refills: 3 | Status: SHIPPED | OUTPATIENT
Start: 2023-10-17

## 2023-10-18 ENCOUNTER — TELEPHONE (OUTPATIENT)
Age: 63
End: 2023-10-18

## 2023-10-18 NOTE — TELEPHONE ENCOUNTER
Contacted [employer] no PA is needed for [id number], [id number], [id number], [id number], [id number].     Tracking ID [id number]

## 2023-10-20 RX ORDER — ESCITALOPRAM OXALATE 20 MG/1
20 TABLET ORAL DAILY
Qty: 90 TABLET | Refills: 2 | Status: SHIPPED | OUTPATIENT
Start: 2023-10-20

## 2023-10-20 NOTE — TELEPHONE ENCOUNTER
Called pt,verified pt with two pt identifiers, asked pt how he is taking the Escitalopram/Lexapro. He looked at his Rx bottles and could not tell. He finally found his bottle of Lexapro and has not been taking daily but has the bottle as needed. He stated he will start taking daily and it will probably help with his anxiety/depression. I advised I will send to our provider for approval and to send to the pharmacy. Pt verbalized understanding and thanked me for bringing to his attention of his medication again. He stated this will really help him now.

## 2023-10-30 ENCOUNTER — PROCEDURE VISIT (OUTPATIENT)
Age: 63
End: 2023-10-30
Payer: COMMERCIAL

## 2023-10-30 DIAGNOSIS — F32.1 CURRENT MODERATE EPISODE OF MAJOR DEPRESSIVE DISORDER WITHOUT PRIOR EPISODE (HCC): ICD-10-CM

## 2023-10-30 DIAGNOSIS — F41.1 GENERALIZED ANXIETY DISORDER: ICD-10-CM

## 2023-10-30 DIAGNOSIS — G31.84 MILD COGNITIVE IMPAIRMENT: Primary | ICD-10-CM

## 2023-10-30 PROCEDURE — 96139 PSYCL/NRPSYC TST TECH EA: CPT | Performed by: CLINICAL NEUROPSYCHOLOGIST

## 2023-10-30 PROCEDURE — 96138 PSYCL/NRPSYC TECH 1ST: CPT | Performed by: CLINICAL NEUROPSYCHOLOGIST

## 2023-10-30 PROCEDURE — 96136 PSYCL/NRPSYC TST PHY/QHP 1ST: CPT | Performed by: CLINICAL NEUROPSYCHOLOGIST

## 2023-11-12 PROBLEM — G31.84 MILD COGNITIVE IMPAIRMENT: Status: ACTIVE | Noted: 2023-02-10

## 2023-11-14 ENCOUNTER — OFFICE VISIT (OUTPATIENT)
Age: 63
End: 2023-11-14
Payer: COMMERCIAL

## 2023-11-14 DIAGNOSIS — G31.84 MILD COGNITIVE IMPAIRMENT: Primary | ICD-10-CM

## 2023-11-14 DIAGNOSIS — F41.1 GENERALIZED ANXIETY DISORDER: ICD-10-CM

## 2023-11-14 DIAGNOSIS — F32.1 CURRENT MODERATE EPISODE OF MAJOR DEPRESSIVE DISORDER WITHOUT PRIOR EPISODE (HCC): ICD-10-CM

## 2023-11-14 PROCEDURE — 96132 NRPSYC TST EVAL PHYS/QHP 1ST: CPT | Performed by: CLINICAL NEUROPSYCHOLOGIST

## 2023-11-14 PROCEDURE — 96133 NRPSYC TST EVAL PHYS/QHP EA: CPT | Performed by: CLINICAL NEUROPSYCHOLOGIST

## 2023-11-15 ENCOUNTER — TELEMEDICINE (OUTPATIENT)
Age: 63
End: 2023-11-15
Payer: COMMERCIAL

## 2023-11-15 DIAGNOSIS — F41.9 ANXIETY AND DEPRESSION: ICD-10-CM

## 2023-11-15 DIAGNOSIS — G31.84 MILD COGNITIVE IMPAIRMENT: Primary | ICD-10-CM

## 2023-11-15 DIAGNOSIS — G47.33 OSA (OBSTRUCTIVE SLEEP APNEA): ICD-10-CM

## 2023-11-15 DIAGNOSIS — F32.A ANXIETY AND DEPRESSION: ICD-10-CM

## 2023-11-15 PROCEDURE — 99215 OFFICE O/P EST HI 40 MIN: CPT | Performed by: PSYCHIATRY & NEUROLOGY

## 2023-11-15 ASSESSMENT — PATIENT HEALTH QUESTIONNAIRE - PHQ9
SUM OF ALL RESPONSES TO PHQ QUESTIONS 1-9: 0
SUM OF ALL RESPONSES TO PHQ9 QUESTIONS 1 & 2: 0
SUM OF ALL RESPONSES TO PHQ QUESTIONS 1-9: 0
2. FEELING DOWN, DEPRESSED OR HOPELESS: 0
SUM OF ALL RESPONSES TO PHQ QUESTIONS 1-9: 0
SUM OF ALL RESPONSES TO PHQ QUESTIONS 1-9: 0
1. LITTLE INTEREST OR PLEASURE IN DOING THINGS: 0

## 2023-11-15 NOTE — PATIENT INSTRUCTIONS
As per discussion    You have a good understanding of the testing results that you had completed with Dr. Mina Kenney  I want you to continue on the Lexapro the 20 mg/day the other name for that is escitalopram    I also want you to work on getting your blood sugars down  You have done a good job of getting the A1c down from 13 to 9 but you still have some work to go.   Elevated blood sugars can definitely affect neurologic function as well as low blood sugars so it is very important to make sure we get this A1c under control at goal range    I also want you to start exercising I want you to work up to doing cardio 30 minutes at least 3 times a week along with some stretching yoga or calisthenics for another 15 or so minutes after the cardio  This will help improve blood flow to the brain  This will help improve blood sugars  And may also possibly result in some weight loss which ultimately improves blood sugars as well    In the meantime I hope you have a laly holiday season and a happy new year    We are going to cancel the March appointment and we will set up a follow-up appointment in June or July after you have seen Dr. Mina Kenney again

## 2023-11-15 NOTE — ASSESSMENT & PLAN NOTE
Patient reports adherence to CPAP and that he follows up with the sleep team regularly  Again we discussed the need to make sure he gets proper oxygenation to the brain to help maximize brain function

## 2023-11-15 NOTE — PROGRESS NOTES
MISC       Continuous Blood Gluc  (FREESTYLE BARI 14 DAY READER) ADRIENNE Use as directed      Insulin Pen Needle (B-D ULTRAFINE III SHORT PEN) 31G X 8 MM MISC USE FOR INJECTING INSULIN ONCE DAILY      Blood Glucose Monitoring Suppl ([employer] BLOOD GLUCOSE METER) w/Device KIT by Does not apply route      allopurinol (ZYLOPRIM) 100 MG tablet Take 1 tablet by mouth daily      carvedilol (COREG) 25 MG tablet Take 1 tablet by mouth 2 times daily (with meals)      amLODIPine (NORVASC) 5 MG tablet Take 1 tablet by mouth daily      Azelastine HCl 137 MCG/SPRAY SOLN SPRAY 2 SPRAYS INTO EACH NOSTRIL EVERY DAY      fluticasone (FLONASE) 50 MCG/ACT nasal spray SPRAY 2 SPRAYS INTO EACH NOSTRIL EVERY DAY      atorvastatin (LIPITOR) 20 MG tablet Take 1 tablet by mouth      vitamin D 25 MCG (1000 UT) CAPS Take 1 capsule by mouth daily      linaclotide (LINZESS) 145 MCG capsule Take by mouth as needed      lisinopril (PRINIVIL;ZESTRIL) 40 MG tablet TAKE ONE TABLET BY MOUTH ONE TIME DAILY      omeprazole (PRILOSEC) 40 MG delayed release capsule Take 1 capsule by mouth daily      spironolactone (ALDACTONE) 25 MG tablet TAKE 1 TABLET DAILY      sucralfate (CARAFATE) 1 GM tablet TAKE 1 TABLET BY MOUTH TWO TIMES A DAY      tiZANidine (ZANAFLEX) 4 MG tablet Take 1 tablet by mouth 2 times daily as needed       No current facility-administered medications for this visit. Past medical history/surgical history, family history, and social history have been reviewed for today's visit         ROS      A ten system review of constitutional, cardiovascular, respiratory, musculoskeletal, endocrine, skin, SHEENT, genitourinary, psychiatric and neurologic systems was obtained and is unremarkable except  as mentioned under HPI               EXAMINATION: Within the context of a virtual visit      Visit Vitals     There were no vitals filed for this visit.             General appearance: Patient is well-developed and well-nourished in no

## 2023-11-15 NOTE — ASSESSMENT & PLAN NOTE
Continue Lexapro 20 mg/day patient is completely off the Remeron  Patient will have repeat neuropsych testing in May 2024    In the meantime I encouraged the patient to continue to keep all comorbid conditions under control and to start an exercise program which often is helpful and mood disorders

## 2023-11-15 NOTE — ASSESSMENT & PLAN NOTE
Neuropsych testing has been completed which shows some mild cognitive impairment with probable anxiety and depressive overlay    Patient has reevaluation with Dr. Orquidea Kidd in May 2023 to see how he does once he has been on anxiety/antidepressant medications for about 6 months  Presently the patient is on Lexapro 20 mg/day    We also talked about the need to make sure all other comorbid conditions are under good control to help maximize brain function especially regarding blood sugars.   And I have recommended patient's start an exercise program to help maintain brain health as well as improve blood sugar management

## 2023-12-01 LAB
Lab: NORMAL
Lab: NORMAL
REFERENCE LAB: NORMAL

## 2023-12-12 ENCOUNTER — HOSPITAL ENCOUNTER (OUTPATIENT)
Facility: HOSPITAL | Age: 63
Discharge: HOME | End: 2023-12-15
Payer: COMMERCIAL

## 2023-12-12 DIAGNOSIS — R10.9 ABDOMINAL PAIN, UNSPECIFIED ABDOMINAL LOCATION: ICD-10-CM

## 2023-12-12 PROCEDURE — 6360000004 HC RX CONTRAST MEDICATION: Performed by: FAMILY MEDICINE

## 2023-12-12 PROCEDURE — 74177 CT ABD & PELVIS W/CONTRAST: CPT

## 2023-12-12 RX ADMIN — IOPAMIDOL 100 ML: 755 INJECTION, SOLUTION INTRAVENOUS at 18:57

## 2023-12-30 ENCOUNTER — HOSPITAL ENCOUNTER (EMERGENCY)
Facility: HOSPITAL | Age: 63
Discharge: HOME | End: 2023-12-30
Payer: COMMERCIAL

## 2023-12-30 ENCOUNTER — APPOINTMENT (OUTPATIENT)
Facility: HOSPITAL | Age: 63
End: 2023-12-30
Payer: COMMERCIAL

## 2023-12-30 VITALS
SYSTOLIC BLOOD PRESSURE: 151 MMHG | BODY MASS INDEX: 30.17 KG/M2 | RESPIRATION RATE: 14 BRPM | DIASTOLIC BLOOD PRESSURE: 63 MMHG | HEART RATE: 69 BPM | TEMPERATURE: 98.4 F | WEIGHT: 203.71 LBS | HEIGHT: 69 IN | OXYGEN SATURATION: 95 %

## 2023-12-30 DIAGNOSIS — J18.9 PNEUMONIA OF BOTH LOWER LOBES DUE TO INFECTIOUS ORGANISM: Primary | ICD-10-CM

## 2023-12-30 LAB
ALBUMIN SERPL-MCNC: 3.6 G/DL (ref 3.5–5)
ALBUMIN/GLOB SERPL: 0.8 (ref 1.1–2.2)
ALP SERPL-CCNC: 101 U/L (ref 45–117)
ALT SERPL-CCNC: 48 U/L (ref 12–78)
ANION GAP SERPL CALC-SCNC: 6 MMOL/L (ref 5–15)
APPEARANCE UR: CLEAR
AST SERPL-CCNC: 42 U/L (ref 15–37)
BACTERIA URNS QL MICRO: ABNORMAL /HPF
BASOPHILS # BLD: 0 K/UL (ref 0–0.1)
BASOPHILS NFR BLD: 0 % (ref 0–1)
BILIRUB SERPL-MCNC: 0.7 MG/DL (ref 0.2–1)
BILIRUB UR QL: NEGATIVE
BUN SERPL-MCNC: 22 MG/DL (ref 6–20)
BUN/CREAT SERPL: 11 (ref 12–20)
CALCIUM SERPL-MCNC: 9.6 MG/DL (ref 8.5–10.1)
CHLORIDE SERPL-SCNC: 106 MMOL/L (ref 97–108)
CO2 SERPL-SCNC: 27 MMOL/L (ref 21–32)
COLOR UR: ABNORMAL
CREAT SERPL-MCNC: 1.96 MG/DL (ref 0.7–1.3)
DIFFERENTIAL METHOD BLD: ABNORMAL
EOSINOPHIL # BLD: 0 K/UL (ref 0–0.4)
EOSINOPHIL NFR BLD: 1 % (ref 0–7)
EPITH CASTS URNS QL MICRO: ABNORMAL /LPF
ERYTHROCYTE [DISTWIDTH] IN BLOOD BY AUTOMATED COUNT: 12.6 % (ref 11.5–14.5)
GLOBULIN SER CALC-MCNC: 4.6 G/DL (ref 2–4)
GLUCOSE SERPL-MCNC: 228 MG/DL (ref 65–100)
GLUCOSE UR STRIP.AUTO-MCNC: 500 MG/DL
HCT VFR BLD AUTO: 45.7 % (ref 36.6–50.3)
HGB BLD-MCNC: 14.8 G/DL (ref 12.1–17)
HGB UR QL STRIP: NEGATIVE
IMM GRANULOCYTES # BLD AUTO: 0 K/UL (ref 0–0.04)
IMM GRANULOCYTES NFR BLD AUTO: 0 % (ref 0–0.5)
KETONES UR QL STRIP.AUTO: NEGATIVE MG/DL
LEUKOCYTE ESTERASE UR QL STRIP.AUTO: NEGATIVE
LIPASE SERPL-CCNC: 45 U/L (ref 13–75)
LYMPHOCYTES # BLD: 0.4 K/UL (ref 0.8–3.5)
LYMPHOCYTES NFR BLD: 14 % (ref 12–49)
MCH RBC QN AUTO: 30.1 PG (ref 26–34)
MCHC RBC AUTO-ENTMCNC: 32.4 G/DL (ref 30–36.5)
MCV RBC AUTO: 93.1 FL (ref 80–99)
MONOCYTES # BLD: 0.5 K/UL (ref 0–1)
MONOCYTES NFR BLD: 15 % (ref 5–13)
NEUTS SEG # BLD: 2.2 K/UL (ref 1.8–8)
NEUTS SEG NFR BLD: 70 % (ref 32–75)
NITRITE UR QL STRIP.AUTO: NEGATIVE
NRBC # BLD: 0 K/UL (ref 0–0.01)
NRBC BLD-RTO: 0 PER 100 WBC
PH UR STRIP: 6 (ref 5–8)
PLATELET # BLD AUTO: 112 K/UL (ref 150–400)
POTASSIUM SERPL-SCNC: 4.4 MMOL/L (ref 3.5–5.1)
PROT SERPL-MCNC: 8.2 G/DL (ref 6.4–8.2)
PROT UR STRIP-MCNC: 30 MG/DL
RBC # BLD AUTO: 4.91 M/UL (ref 4.1–5.7)
RBC #/AREA URNS HPF: ABNORMAL /HPF (ref 0–5)
RBC MORPH BLD: ABNORMAL
SODIUM SERPL-SCNC: 139 MMOL/L (ref 136–145)
SP GR UR REFRACTOMETRY: 1.01 (ref 1–1.03)
SPECIMEN HOLD: NORMAL
UROBILINOGEN UR QL STRIP.AUTO: 0.2 EU/DL (ref 0.2–1)
WBC # BLD AUTO: 3.1 K/UL (ref 4.1–11.1)
WBC URNS QL MICRO: ABNORMAL /HPF (ref 0–4)

## 2023-12-30 PROCEDURE — 6360000002 HC RX W HCPCS

## 2023-12-30 PROCEDURE — C9113 INJ PANTOPRAZOLE SODIUM, VIA: HCPCS

## 2023-12-30 PROCEDURE — 96374 THER/PROPH/DIAG INJ IV PUSH: CPT

## 2023-12-30 PROCEDURE — 74176 CT ABD & PELVIS W/O CONTRAST: CPT

## 2023-12-30 PROCEDURE — 2580000003 HC RX 258

## 2023-12-30 PROCEDURE — 99284 EMERGENCY DEPT VISIT MOD MDM: CPT

## 2023-12-30 PROCEDURE — 81001 URINALYSIS AUTO W/SCOPE: CPT

## 2023-12-30 PROCEDURE — 85025 COMPLETE CBC W/AUTO DIFF WBC: CPT

## 2023-12-30 PROCEDURE — 80053 COMPREHEN METABOLIC PANEL: CPT

## 2023-12-30 PROCEDURE — A4216 STERILE WATER/SALINE, 10 ML: HCPCS

## 2023-12-30 PROCEDURE — 6370000000 HC RX 637 (ALT 250 FOR IP)

## 2023-12-30 PROCEDURE — 83690 ASSAY OF LIPASE: CPT

## 2023-12-30 PROCEDURE — 36415 COLL VENOUS BLD VENIPUNCTURE: CPT

## 2023-12-30 RX ORDER — AMOXICILLIN AND CLAVULANATE POTASSIUM 875; 125 MG/1; MG/1
1 TABLET, FILM COATED ORAL 2 TIMES DAILY
Qty: 10 TABLET | Refills: 0 | Status: SHIPPED | OUTPATIENT
Start: 2023-12-30 | End: 2024-01-04

## 2023-12-30 RX ORDER — MAGNESIUM HYDROXIDE/ALUMINUM HYDROXICE/SIMETHICONE 120; 1200; 1200 MG/30ML; MG/30ML; MG/30ML
30 SUSPENSION ORAL
Status: COMPLETED | OUTPATIENT
Start: 2023-12-30 | End: 2023-12-30

## 2023-12-30 RX ORDER — DOXYCYCLINE HYCLATE 100 MG
100 TABLET ORAL 2 TIMES DAILY
Qty: 10 TABLET | Refills: 0 | Status: SHIPPED | OUTPATIENT
Start: 2023-12-30 | End: 2024-01-04

## 2023-12-30 RX ADMIN — PANTOPRAZOLE SODIUM 40 MG: 40 INJECTION, POWDER, FOR SOLUTION INTRAVENOUS at 19:57

## 2023-12-30 RX ADMIN — ALUMINUM HYDROXIDE, MAGNESIUM HYDROXIDE, AND SIMETHICONE 30 ML: 200; 200; 20 SUSPENSION ORAL at 19:55

## 2023-12-30 ASSESSMENT — PAIN SCALES - GENERAL
PAINLEVEL_OUTOF10: 5
PAINLEVEL_OUTOF10: 7

## 2023-12-30 ASSESSMENT — PAIN DESCRIPTION - ORIENTATION: ORIENTATION: ANTERIOR

## 2023-12-30 ASSESSMENT — PAIN DESCRIPTION - DESCRIPTORS: DESCRIPTORS: TENDER

## 2023-12-30 ASSESSMENT — PAIN DESCRIPTION - LOCATION: LOCATION: ABDOMEN

## 2023-12-30 NOTE — ED NOTES
States that earlier in the day he was doubled over in pain and this has been reoccurring over the last 2 weeks, states that he had a normal Bmthis morning

## 2023-12-31 NOTE — ED PROVIDER NOTES
100 WBC   nRBC 0.00 K/uL   Neutrophils % 70 %   Lymphocytes % 14 %   Monocytes % 15 High  %   Eosinophils % 1 %   Basophils % 0 %   Immature Granulocytes 0 %   Neutrophils Absolute 2.2 K/UL   Lymphocytes Absolute 0.4 Low  K/UL   Monocytes Absolute 0.5 K/UL   Eosinophils Absolute 0.0 K/UL   Basophils Absolute 0.0 K/UL   Absolute Immature Granulocyte 0.0 K/UL   Differential Type SMEAR SCANNED     RBC Comment NORMOCYTIC, NORMOCHROMIC             Recent Results (from the past 24 hour(s))   Urinalysis    Collection Time: 12/30/23  7:30 PM   Result Value Ref Range    Color, UA YELLOW/STRAW      Appearance CLEAR CLEAR      Specific Gravity, UA 1.015 1.003 - 1.030      pH, Urine 6.0 5.0 - 8.0      Protein, UA 30 (A) NEG mg/dL    Glucose,  (A) NEG mg/dL    Ketones, Urine Negative NEG mg/dL    Bilirubin Urine Negative NEG      Blood, Urine Negative NEG      Urobilinogen, Urine 0.2 0.2 - 1.0 EU/dL    Nitrite, Urine Negative NEG      Leukocyte Esterase, Urine Negative NEG      WBC, UA 10-20 0 - 4 /hpf    RBC, UA 0-5 0 - 5 /hpf    Epithelial Cells UA FEW FEW /lpf    BACTERIA, URINE 2+ (A) NEG /hpf   Urine Culture Hold Sample    Collection Time: 12/30/23  7:30 PM    Specimen: Urine   Result Value Ref Range    Specimen HOld        Urine on hold in Microbiology dept for 2 days.  If unpreserved urine is submitted, it cannot be used for addtional testing after 24 hours, recollection will be required.        RADIOLOGY:  Non-plain film images such as CT, Ultrasound and MRI are read by the radiologist.      Interpretation per the Radiologist below, if available at the time of this note:     CT ABDOMEN PELVIS WO CONTRAST Additional Contrast? Radiologist Recommendation   Final Result   1. New, small bibasilar airspace opacities, likely infectious in etiology.   Dedicated CT of the chest can be performed for further evaluation, as indicated.               PROCEDURES   Unless otherwise noted below, none  Procedures     CRITICAL CARE

## 2024-01-03 ENCOUNTER — HOSPITAL ENCOUNTER (EMERGENCY)
Facility: HOSPITAL | Age: 64
Discharge: LEFT BEFORE DISCHARGE | End: 2024-01-03

## 2024-01-03 VITALS
OXYGEN SATURATION: 98 % | BODY MASS INDEX: 29.4 KG/M2 | DIASTOLIC BLOOD PRESSURE: 88 MMHG | SYSTOLIC BLOOD PRESSURE: 100 MMHG | HEART RATE: 70 BPM | WEIGHT: 199.08 LBS | TEMPERATURE: 97.5 F | RESPIRATION RATE: 16 BRPM

## 2024-01-03 ASSESSMENT — PAIN SCALES - GENERAL: PAINLEVEL_OUTOF10: 6

## 2024-01-11 ENCOUNTER — TELEPHONE (OUTPATIENT)
Age: 64
End: 2024-01-11

## 2024-01-11 RX ORDER — INSULIN GLARGINE 300 U/ML
INJECTION, SOLUTION SUBCUTANEOUS
Qty: 22.5 ML | Refills: 3
Start: 2024-01-11

## 2024-01-11 NOTE — TELEPHONE ENCOUNTER
We had the following [employer] exchange:    As long as his blood sugars are not going under 90 taking 70 units of toujeo, then I recommend keeping his dose the same.  If he has 2 or more readings under 90 in a week, then decrease your insulin by 5 units every week as needed to keep his readings between .      Take care and hope you had a Vita Colleen and wish you a Happy New Year!    Wish him a happy birthday on [birth year].      ===View-only below this line===      ----- Message -----       From:Hilary Moncada (proxy for Calderon Moncada III)       Sent:1/11/2024  4:21 PM EST         To:Dr. Aaron Cleveland    Subject:Calderon's numbers    Hi Dr Cleveland,    Calderon was diagnosed with pneumonia on 12/30/23 and thrush on 1/4/24.  I was diagnosed with Covid on 1/1/24 so neither of us have been really out and about.    As a result, Calderon has not had much junk food, fast food or soda; actually, he has not been eating much at all.  His average glucose level has been 109 and in range 100% of the time.  It did go up to 155 yesterday briefly and right back down to 130.  It's now down to 110.    He is currently taking 70 units of Toujou.  Should he continue with this amount or lower the number of units.  My fear is that once he's better, he'll go back to the same bad eating habits.    Any advice would be greatly appreciated.    Thank you,  Hilary Moncada

## 2024-01-11 NOTE — TELEPHONE ENCOUNTER
Pt LVM stating his blood glucose numbers have been running in the 100's. Pt states he hasn't taken his insulin in a few days and the numbers have been steady. Pt's wife sent a MyChart message regarding his blood sugars as well.

## 2024-01-18 LAB
BUN SERPL-MCNC: 14 MG/DL (ref 8–27)
BUN/CREAT SERPL: 9 (ref 10–24)
CALCIUM SERPL-MCNC: 9.7 MG/DL (ref 8.6–10.2)
CHLORIDE SERPL-SCNC: 104 MMOL/L (ref 96–106)
CO2 SERPL-SCNC: 25 MMOL/L (ref 20–29)
CREAT SERPL-MCNC: 1.48 MG/DL (ref 0.76–1.27)
EGFRCR SERPLBLD CKD-EPI 2021: 53 ML/MIN/1.73
EST. AVERAGE GLUCOSE BLD GHB EST-MCNC: 209 MG/DL
GLUCOSE SERPL-MCNC: 158 MG/DL (ref 70–99)
HBA1C MFR BLD: 8.9 % (ref 4.8–5.6)
Lab: NORMAL
POTASSIUM SERPL-SCNC: 3.6 MMOL/L (ref 3.5–5.2)
REPORT: NORMAL
SODIUM SERPL-SCNC: 144 MMOL/L (ref 134–144)

## 2024-01-25 ENCOUNTER — TELEPHONE (OUTPATIENT)
Age: 64
End: 2024-01-25

## 2024-01-25 NOTE — TELEPHONE ENCOUNTER
Patients wife called and said he was having increased abdominal pain and needed Dr Jacques to order another HIDA scan. I spoke with Dr Jacques and he said all of his test have been negative so if he is having increased abdominal pain he should see his PCP or GI specialist. I made her aware and she understood.

## 2024-01-26 RX ORDER — SPIRONOLACTONE 25 MG/1
TABLET ORAL
Qty: 90 TABLET | Refills: 3 | Status: SHIPPED | OUTPATIENT
Start: 2024-01-26

## 2024-02-26 RX ORDER — PEN NEEDLE, DIABETIC 31 GX5/16"
NEEDLE, DISPOSABLE MISCELLANEOUS
Qty: 90 EACH | Refills: 3 | Status: SHIPPED | OUTPATIENT
Start: 2024-02-26

## 2024-03-01 DIAGNOSIS — Z79.4 TYPE 2 DIABETES MELLITUS WITH HYPERGLYCEMIA, WITH LONG-TERM CURRENT USE OF INSULIN (HCC): ICD-10-CM

## 2024-03-01 DIAGNOSIS — E78.2 MIXED HYPERLIPIDEMIA: ICD-10-CM

## 2024-03-01 DIAGNOSIS — E11.65 TYPE 2 DIABETES MELLITUS WITH HYPERGLYCEMIA, WITH LONG-TERM CURRENT USE OF INSULIN (HCC): ICD-10-CM

## 2024-03-02 LAB
ALBUMIN SERPL-MCNC: 4.2 G/DL (ref 3.9–4.9)
ALBUMIN/GLOB SERPL: 1.3 {RATIO} (ref 1.2–2.2)
ALP SERPL-CCNC: 107 IU/L (ref 44–121)
ALT SERPL-CCNC: 30 IU/L (ref 0–44)
AST SERPL-CCNC: 29 IU/L (ref 0–40)
BILIRUB SERPL-MCNC: 1 MG/DL (ref 0–1.2)
BUN SERPL-MCNC: 12 MG/DL (ref 8–27)
BUN/CREAT SERPL: 10 (ref 10–24)
CALCIUM SERPL-MCNC: 9.5 MG/DL (ref 8.6–10.2)
CHLORIDE SERPL-SCNC: 103 MMOL/L (ref 96–106)
CHOLEST SERPL-MCNC: 139 MG/DL (ref 100–199)
CO2 SERPL-SCNC: 23 MMOL/L (ref 20–29)
CREAT SERPL-MCNC: 1.21 MG/DL (ref 0.76–1.27)
EGFRCR SERPLBLD CKD-EPI 2021: 67 ML/MIN/1.73
GLOBULIN SER CALC-MCNC: 3.2 G/DL (ref 1.5–4.5)
GLUCOSE SERPL-MCNC: 141 MG/DL (ref 70–99)
HBA1C MFR BLD: 8.5 % (ref 4.8–5.6)
HDLC SERPL-MCNC: 33 MG/DL
LDLC SERPL CALC-MCNC: 70 MG/DL (ref 0–99)
POTASSIUM SERPL-SCNC: 4.1 MMOL/L (ref 3.5–5.2)
PROT SERPL-MCNC: 7.4 G/DL (ref 6–8.5)
SODIUM SERPL-SCNC: 142 MMOL/L (ref 134–144)
TRIGL SERPL-MCNC: 216 MG/DL (ref 0–149)
VLDLC SERPL CALC-MCNC: 36 MG/DL (ref 5–40)

## 2024-03-03 LAB
ALBUMIN/CREAT UR: 81 MG/G CREAT (ref 0–29)
CREAT UR-MCNC: 104.8 MG/DL
IMP & REVIEW OF LAB RESULTS: NORMAL
Lab: NORMAL
MICROALBUMIN UR-MCNC: 84.4 UG/ML

## 2024-03-06 NOTE — PROGRESS NOTES
[address]   [place] [address]   Tel.  [phone]   Fax. [phone]  [address]   [place] [address]   Tel.  [phone]   Fax. [phone] [address] [place] [address]   Tel.  [phone]   Fax. [phone]     Calderon Moncada III (: 1960) is a 64 y.o. male, established patient, seen for positive airway pressure follow-up, he was last seen by Dr. Mitchell on 2023, prior notes reviewed in detail.  Original diagnosis of sleep apnea in  with an AHI of 25/h. He is seen today for follow up.     ASSESSMENT/PLAN:   Diagnosis Orders   1. Obstructive sleep apnea (adult) (pediatric)  DME Order for (Specify) as OP      2. Essential (primary) hypertension        3. Type 2 diabetes mellitus with hyperglycemia, with long-term current use of insulin ([facility])        4. BMI 29.0-29.9,adult          AHI = 25 ().  On Respironics CPAP :  8-12 cmH2O. Set up 2019.    He is not compliant with PAP therapy although when used PAP shows benefit to the patient and remains necessary for control of his sleep apnea. There is continued clinical benefit from the hours of use demonstrated by AHI reduced from 25/hr to 1.2/hr.      No follow-up provider specified.    Sleep Apnea -  He started back using his machine about 2 weeks ago and notes he is getting much better sleep. Order placed for new supplies.     Orders Placed This Encounter   Procedures    DME Order for (Specify) as OP     Diagnosis: (G47.33) CARLTON (obstructive sleep apnea)  (primary encounter diagnosis)     Replacement Supplies for Positive Airway Pressure Therapy Device:   Duration of need: 99 months.        Full Face Mask 1 every 3 months.   Full Face Mask Cushion 1 per month.       Headgear 1 every 6 months.   Positive Airway Pressure chinstrap 1 every 6 months.     Tubing with heating element 1 every 3 months.     Filter(s) Disposable 2 per month.   Filter(s)  No pertinent family history in first degree relatives

## 2024-03-07 ENCOUNTER — CLINICAL DOCUMENTATION (OUTPATIENT)
Age: 64
End: 2024-03-07

## 2024-03-07 ENCOUNTER — TELEMEDICINE (OUTPATIENT)
Age: 64
End: 2024-03-07
Payer: COMMERCIAL

## 2024-03-07 DIAGNOSIS — G47.33 OBSTRUCTIVE SLEEP APNEA (ADULT) (PEDIATRIC): Primary | ICD-10-CM

## 2024-03-07 DIAGNOSIS — Z79.4 TYPE 2 DIABETES MELLITUS WITH HYPERGLYCEMIA, WITH LONG-TERM CURRENT USE OF INSULIN (HCC): ICD-10-CM

## 2024-03-07 DIAGNOSIS — E11.65 TYPE 2 DIABETES MELLITUS WITH HYPERGLYCEMIA, WITH LONG-TERM CURRENT USE OF INSULIN (HCC): ICD-10-CM

## 2024-03-07 DIAGNOSIS — I10 ESSENTIAL (PRIMARY) HYPERTENSION: ICD-10-CM

## 2024-03-07 PROCEDURE — 99213 OFFICE O/P EST LOW 20 MIN: CPT | Performed by: NURSE PRACTITIONER

## 2024-03-07 PROCEDURE — 3052F HG A1C>EQUAL 8.0%<EQUAL 9.0%: CPT | Performed by: NURSE PRACTITIONER

## 2024-03-07 ASSESSMENT — SLEEP AND FATIGUE QUESTIONNAIRES
HOW LIKELY ARE YOU TO NOD OFF OR FALL ASLEEP WHILE SITTING INACTIVE IN A PUBLIC PLACE: 0
HOW LIKELY ARE YOU TO NOD OFF OR FALL ASLEEP WHILE LYING DOWN TO REST IN THE AFTERNOON WHEN CIRCUMSTANCES PERMIT: 1
HOW LIKELY ARE YOU TO NOD OFF OR FALL ASLEEP WHILE SITTING AND READING: 0
HOW LIKELY ARE YOU TO NOD OFF OR FALL ASLEEP WHILE SITTING AND TALKING TO SOMEONE: 0
HOW LIKELY ARE YOU TO NOD OFF OR FALL ASLEEP WHEN YOU ARE A PASSENGER IN A CAR FOR AN HOUR WITHOUT A BREAK: 0
HOW LIKELY ARE YOU TO NOD OFF OR FALL ASLEEP WHILE SITTING QUIETLY AFTER LUNCH WITHOUT ALCOHOL: 0
HOW LIKELY ARE YOU TO NOD OFF OR FALL ASLEEP IN A CAR, WHILE STOPPED FOR A FEW MINUTES IN TRAFFIC: 0
HOW LIKELY ARE YOU TO NOD OFF OR FALL ASLEEP WHILE WATCHING TV: 1
ESS TOTAL SCORE: 2

## 2024-03-07 NOTE — PATIENT INSTRUCTIONS
[address]  [place] [address]  Tel.  [phone]  Fax. [phone] [address]  [place] [address]  Tel.  [phone]  Fax. [phone] [address] [place] [address]  Tel.  [phone]  Fax. [phone]     Learning About CPAP for Sleep Apnea  What is CPAP?              CPAP is a small machine that you use at home every night while you sleep. It increases air pressure in your throat to keep your airway open. When you have sleep apnea, this can help you sleep better so you feel much better. CPAP stands for \"continuous positive airway pressure.\"  The CPAP machine will have one of the following:  A mask that covers your nose and mouth  Prongs that fit into your nose  A mask that covers your nose only, the most common type. This type is called NCPAP. The N stands for \"nasal.\"  Why is it done?  CPAP is usually the best treatment for obstructive sleep apnea. It is the first treatment choice and the most widely used. Your doctor may suggest CPAP if you have:  Moderate to severe sleep apnea.  Sleep apnea and coronary artery disease (CAD) or heart failure.  How does it help?  CPAP can help you have more normal sleep, so you feel less sleepy and more alert during the daytime.  CPAP may help keep heart failure or other heart problems from getting worse.  NCPAP may help lower your blood pressure.  If you use CPAP, your bed partner may also sleep better because you are not snoring or restless.  What are the side effects?  Some people who use CPAP have:  A dry or stuffy nose and a sore throat.  Irritated skin on the face.  Sore eyes.  Bloating.  If you have any of these problems, work with your doctor to fix them. Here are some things you can try:  Be sure the mask or nasal prongs fit well.  See if your doctor can adjust the pressure of your CPAP.  If your nose is dry, try a humidifier.  If your nose is runny or stuffy, try decongestant medicine or a steroid

## 2024-03-18 ENCOUNTER — OFFICE VISIT (OUTPATIENT)
Age: 64
End: 2024-03-18
Payer: COMMERCIAL

## 2024-03-18 VITALS
HEART RATE: 60 BPM | DIASTOLIC BLOOD PRESSURE: 78 MMHG | SYSTOLIC BLOOD PRESSURE: 142 MMHG | WEIGHT: 204.6 LBS | HEIGHT: 69 IN | BODY MASS INDEX: 30.3 KG/M2

## 2024-03-18 DIAGNOSIS — E11.65 TYPE 2 DIABETES MELLITUS WITH HYPERGLYCEMIA, WITH LONG-TERM CURRENT USE OF INSULIN (HCC): Primary | ICD-10-CM

## 2024-03-18 DIAGNOSIS — Z79.4 TYPE 2 DIABETES MELLITUS WITH HYPERGLYCEMIA, WITH LONG-TERM CURRENT USE OF INSULIN (HCC): Primary | ICD-10-CM

## 2024-03-18 DIAGNOSIS — I10 ESSENTIAL (PRIMARY) HYPERTENSION: ICD-10-CM

## 2024-03-18 DIAGNOSIS — E78.2 MIXED HYPERLIPIDEMIA: ICD-10-CM

## 2024-03-18 PROCEDURE — 3052F HG A1C>EQUAL 8.0%<EQUAL 9.0%: CPT | Performed by: INTERNAL MEDICINE

## 2024-03-18 PROCEDURE — 95251 CONT GLUC MNTR ANALYSIS I&R: CPT | Performed by: INTERNAL MEDICINE

## 2024-03-18 PROCEDURE — 99214 OFFICE O/P EST MOD 30 MIN: CPT | Performed by: INTERNAL MEDICINE

## 2024-03-18 PROCEDURE — 3077F SYST BP >= 140 MM HG: CPT | Performed by: INTERNAL MEDICINE

## 2024-03-18 PROCEDURE — 3078F DIAST BP <80 MM HG: CPT | Performed by: INTERNAL MEDICINE

## 2024-03-18 RX ORDER — SPIRONOLACTONE 50 MG/1
50 TABLET, FILM COATED ORAL DAILY
Qty: 90 TABLET | Refills: 3 | Status: SHIPPED | OUTPATIENT
Start: 2024-03-18

## 2024-03-18 NOTE — PATIENT INSTRUCTIONS
1) Your blood pressure is above goal of 140/90 or less.  I sent the spironolactone 50 mg tabs to take [facility].  Take 2 of the 25 mg tabs once daily until these run out and then take 1 of the 100 mg tabs once daily.    Start monitoring blood pressure about 2-3 times per week at alternating times either in the morning or evening after resting for 5 minutes and sitting upright in a chair with your arm at heart level. Please let me know if you are having readings over 140 on the top number or 90 on the bottom number after 2 weeks on the higher dose.    2) Your Hemoglobin A1c (3 month test of blood sugar) is much better at 8.5% down from 13% in June 2023 but your recent sugars over the past 7 days are not well controlled due to diet.  Please eliminate the sweets and soda as best as possible to get your sugars back down.    3) BUN and creatinine are markers of kidney function.  Your values are normal.    4) ALT and AST are markers of liver function.  Your values are normal.    5) Your triglycerides (short term fats) were high at 216 but they had been over 300 and should come down further with lower A1c.

## 2024-03-18 NOTE — PROGRESS NOTES
Chief Complaint   Patient presents with    Diabetes     PCP and pharmacy confirmed     History of Present Illness: Calderon Moncada III is a 64 y.o. male here for follow up of diabetes.  Weight down 2 lbs since last visit in 9/23.  Has been taking toujeo 70 units in the morning and jardiance 10 mg daily.  His blood sugars had been much better controlled prior to his lab draw with review of his bari data for 90 days shows an avg sugar of of 175 with 54% in range, 24% high and 17% very high and 5% low but over the past 2 weeks he has not been careful at all with his diet and has gone back to eating more junk food and drinking more sugars and his sugars are back in the 200-300s. States he had gotten more depressed.  Compliant with his BP and lipid regimen but has seen some home BP readings over 140 systolic.                Current Outpatient Medications   Medication Sig    B-D ULTRAFINE III SHORT PEN 31G X 8 MM MISC USE FOR INJECTING INSULIN ONCE DAILY    spironolactone (ALDACTONE) 25 MG tablet TAKE 1 TABLET DAILY    insulin glargine, 1 unit dial, (TOUJEO SOLOSTAR) 300 UNIT/ML concentrated injection pen INJECT 70 UNITS UNDER THE SKIN ONCE DAILY OR AS DIRECTED UP TO 75 UNITS DAILY--Dose change 01/11/24--updated med list--did not send prescription to the pharmacy    escitalopram (LEXAPRO) 20 MG tablet Take 1 tablet by mouth daily    empagliflozin (JARDIANCE) 10 MG tablet Take 1 tablet by mouth daily    hydroquinone 4 % cream     Continuous Blood Gluc  (FREESTYLE BARI 14 DAY READER) ADRIENNE Use as directed    Insulin Pen Needle (B-D ULTRAFINE III SHORT PEN) 31G X 8 MM MISC USE FOR INJECTING INSULIN ONCE DAILY    Blood Glucose Monitoring Suppl ([employer] BLOOD GLUCOSE METER) w/Device KIT by Does not apply route    allopurinol (ZYLOPRIM) 100 MG tablet Take 1 tablet by mouth daily    carvedilol (COREG) 25 MG tablet Take 1 tablet by mouth 2 times daily (with meals)    amLODIPine (NORVASC) 5 MG tablet Take 1 tablet by mouth daily

## 2024-03-26 RX ORDER — FLASH GLUCOSE SENSOR
KIT MISCELLANEOUS
Qty: 6 EACH | Refills: 3 | Status: SHIPPED | OUTPATIENT
Start: 2024-03-26

## 2024-04-09 DIAGNOSIS — F41.9 ANXIETY DISORDER, UNSPECIFIED: ICD-10-CM

## 2024-04-09 RX ORDER — ESCITALOPRAM OXALATE 20 MG/1
20 TABLET ORAL DAILY
Qty: 90 TABLET | Refills: 3 | Status: SHIPPED | OUTPATIENT
Start: 2024-04-09

## 2024-04-25 RX ORDER — ATORVASTATIN CALCIUM 20 MG/1
20 TABLET, FILM COATED ORAL NIGHTLY
Qty: 90 TABLET | Refills: 3 | Status: SHIPPED | OUTPATIENT
Start: 2024-04-25

## 2024-05-16 NOTE — PROGRESS NOTES
needed, Disp: , Rfl:     Pertinent Neurological History:  Seizure: Never  Stroke: Never  TBI: Never    Neurodiagnostic Findings:  Imaging:   MRI brain (10/9/2023) revealed \"there is mild generalized volume loss with mild prominence of the ventricles, cisterns, and sulci.  No hydrocephalus is demonstrated.  There is no significant brain parenchymal signal abnormality.  There is no mass, mass effect, or midline shift.  There is no acute intracranial hemorrhage or abnormal extra-axial fluid collection.  Diffusion imaging demonstrates no acute infarction.  There are normal T2 flow voids in the larger intracranial vessels.\"  MRI brain (5/15/2023) was interpreted as \"no acute intracranial abnormality.\"  MRI brain (3/4/2023) was interpreted as \"no significant abnormality or acute process.\"     Pertinent Labs:  Lab Date Result   Vitamin B12 10/13/2022 Within reference range   A1c 3/1/2024 High at 8.5%      PSYCHOSOCIAL HISTORY:  Birth/Development: Born and raised in [place]  Language: English   Education: Withdrew from college after his shahrzad year  Academic Problems: Denied  Occupation: / for [employer].  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  No POA     Substance Use:  Alcohol: Denied  Nicotine: Denied  Recreational/Illicit Substances: Denied  Treatment: Denied    BEHAVIORAL OBSERVATIONS:  Appearance: Casually dressed, Well-groomed, and Appeared stated age  Orientation: Correctly identified the day of the week.  Reported the month to be August and the year to be 2023; was unable to report the date.  (Correct: 5/17/2024).  Generally oriented to circumstance.  He was oriented to person and place.  Motor: Ambulated independently, Adequate gait, Adequate posture, No involuntary movements, and Adequate strength  Thought Processes: Clear, Coherent, Logical,

## 2024-05-17 ENCOUNTER — PROCEDURE VISIT (OUTPATIENT)
Age: 64
End: 2024-05-17
Payer: COMMERCIAL

## 2024-05-17 ENCOUNTER — OFFICE VISIT (OUTPATIENT)
Age: 64
End: 2024-05-17

## 2024-05-17 DIAGNOSIS — F32.A ANXIETY AND DEPRESSION: ICD-10-CM

## 2024-05-17 DIAGNOSIS — G31.84 MILD COGNITIVE IMPAIRMENT: Primary | ICD-10-CM

## 2024-05-17 DIAGNOSIS — R41.9 NEUROCOGNITIVE DISORDER: Primary | ICD-10-CM

## 2024-05-17 DIAGNOSIS — F32.1 CURRENT MODERATE EPISODE OF MAJOR DEPRESSIVE DISORDER WITHOUT PRIOR EPISODE (HCC): ICD-10-CM

## 2024-05-17 DIAGNOSIS — F41.1 GENERALIZED ANXIETY DISORDER: ICD-10-CM

## 2024-05-17 DIAGNOSIS — F41.9 ANXIETY AND DEPRESSION: ICD-10-CM

## 2024-05-17 PROCEDURE — 96133 NRPSYC TST EVAL PHYS/QHP EA: CPT | Performed by: CLINICAL NEUROPSYCHOLOGIST

## 2024-05-17 PROCEDURE — 96132 NRPSYC TST EVAL PHYS/QHP 1ST: CPT | Performed by: CLINICAL NEUROPSYCHOLOGIST

## 2024-05-17 PROCEDURE — 96138 PSYCL/NRPSYC TECH 1ST: CPT | Performed by: CLINICAL NEUROPSYCHOLOGIST

## 2024-05-17 PROCEDURE — 96139 PSYCL/NRPSYC TST TECH EA: CPT | Performed by: CLINICAL NEUROPSYCHOLOGIST

## 2024-06-11 NOTE — PROGRESS NOTES
a non-memory based PVT and his performance on an embedded PVT were within acceptable limits.  Because of this PVT failure, I strongly recommend significant caution when reviewing the below results.    Finally, the patient's performance on the current evaluation was compared with their performance on the 2023 evaluation using both reliable change estimates that control for practice effects as well as qualitative observations of significant clinical change. A change was deemed to be statistically reliable if the z-score surpassed +/- 1.64 (90% confidence that change reflects a real change when accounting for error and practice effect). RCI data matching key demographic variables (e.g., age, ethnicity, gender, education, test-retest interval, etc.) may not be available and as such RCI is used more as a guide rather than a definitive indicator of change.    Premorbid Functioning:   Average (established by previous evaluation)  Attention:   Brief auditory attention: Average; unchanged  Auditory working memory: Low average to average; unchanged  Processing Speed:  Exceptionally low to average but generally in the low average range.  1 out of 4 measures was notable for statistically significant decline while the remaining three measures were clinically and statistically unchanged.  Executive Functioning:   Mental set switching: Low average; two set loss errors.  Statistically slower than the previous evaluation  Problem Solving: Exceptionally low; clinically below the previous evaluation  Inhibition: Low average for speed but high average for accuracy; clinically slower than the previous evaluation  Inhibition/Switching: Discontinued during practice due to excessive errors.  Clinically below the previous evaluation  Visuospatial:  Basic visuoperception: Above average; unchanged  Pattern reconstruction: Low average; unchanged  Complex figure copy: Within normal limits; clinically better than the previous

## 2024-06-25 ENCOUNTER — OFFICE VISIT (OUTPATIENT)
Age: 64
End: 2024-06-25
Payer: COMMERCIAL

## 2024-06-25 VITALS
HEIGHT: 69 IN | TEMPERATURE: 97.6 F | BODY MASS INDEX: 30.24 KG/M2 | WEIGHT: 204.2 LBS | DIASTOLIC BLOOD PRESSURE: 77 MMHG | RESPIRATION RATE: 18 BRPM | HEART RATE: 61 BPM | OXYGEN SATURATION: 96 % | SYSTOLIC BLOOD PRESSURE: 137 MMHG

## 2024-06-25 DIAGNOSIS — R74.8 ABNORMAL LEVELS OF OTHER SERUM ENZYMES: Primary | ICD-10-CM

## 2024-06-25 LAB
ALBUMIN SERPL-MCNC: 4.1 G/DL (ref 3.5–5)
ALBUMIN/GLOB SERPL: 1 (ref 1.1–2.2)
ALP SERPL-CCNC: 110 U/L (ref 45–117)
ALT SERPL-CCNC: 40 U/L (ref 12–78)
AST SERPL-CCNC: 26 U/L (ref 15–37)
BASOPHILS # BLD: 0.1 K/UL (ref 0–0.1)
BASOPHILS NFR BLD: 1 % (ref 0–1)
BILIRUB DIRECT SERPL-MCNC: 0.4 MG/DL (ref 0–0.2)
BILIRUB SERPL-MCNC: 1.5 MG/DL (ref 0.2–1)
DIFFERENTIAL METHOD BLD: ABNORMAL
EOSINOPHIL # BLD: 0.2 K/UL (ref 0–0.4)
EOSINOPHIL NFR BLD: 4 % (ref 0–7)
ERYTHROCYTE [DISTWIDTH] IN BLOOD BY AUTOMATED COUNT: 12.9 % (ref 11.5–14.5)
GLOBULIN SER CALC-MCNC: 4.1 G/DL (ref 2–4)
HCT VFR BLD AUTO: 47.7 % (ref 36.6–50.3)
HGB BLD-MCNC: 15.7 G/DL (ref 12.1–17)
IMM GRANULOCYTES # BLD AUTO: 0 K/UL (ref 0–0.04)
IMM GRANULOCYTES NFR BLD AUTO: 0 % (ref 0–0.5)
LYMPHOCYTES # BLD: 0.4 K/UL (ref 0.8–3.5)
LYMPHOCYTES NFR BLD: 8 % (ref 12–49)
MCH RBC QN AUTO: 30.6 PG (ref 26–34)
MCHC RBC AUTO-ENTMCNC: 32.9 G/DL (ref 30–36.5)
MCV RBC AUTO: 93 FL (ref 80–99)
MONOCYTES # BLD: 0.4 K/UL (ref 0–1)
MONOCYTES NFR BLD: 8 % (ref 5–13)
NEUTS SEG # BLD: 4.5 K/UL (ref 1.8–8)
NEUTS SEG NFR BLD: 79 % (ref 32–75)
NRBC # BLD: 0 K/UL (ref 0–0.01)
NRBC BLD-RTO: 0 PER 100 WBC
PLATELET # BLD AUTO: 139 K/UL (ref 150–400)
PROT SERPL-MCNC: 8.2 G/DL (ref 6.4–8.2)
RBC # BLD AUTO: 5.13 M/UL (ref 4.1–5.7)
RBC MORPH BLD: ABNORMAL
WBC # BLD AUTO: 5.6 K/UL (ref 4.1–11.1)

## 2024-06-25 PROCEDURE — 3078F DIAST BP <80 MM HG: CPT | Performed by: PHYSICIAN ASSISTANT

## 2024-06-25 PROCEDURE — 99214 OFFICE O/P EST MOD 30 MIN: CPT | Performed by: PHYSICIAN ASSISTANT

## 2024-06-25 PROCEDURE — 91200 LIVER ELASTOGRAPHY: CPT | Performed by: PHYSICIAN ASSISTANT

## 2024-06-25 PROCEDURE — 3075F SYST BP GE 130 - 139MM HG: CPT | Performed by: PHYSICIAN ASSISTANT

## 2024-06-25 RX ORDER — HYDROCHLOROTHIAZIDE 12.5 MG/1
12.5 TABLET ORAL DAILY
COMMUNITY
Start: 2024-05-10 | End: 2025-05-10

## 2024-06-25 RX ORDER — TRAZODONE HYDROCHLORIDE 50 MG/1
TABLET ORAL EVERY 24 HOURS
COMMUNITY

## 2024-06-25 RX ORDER — LANSOPRAZOLE 30 MG/1
30 CAPSULE, DELAYED RELEASE ORAL EVERY MORNING
COMMUNITY
Start: 2024-05-16

## 2024-06-25 RX ORDER — BLOOD-GLUCOSE SENSOR
EACH MISCELLANEOUS
COMMUNITY
Start: 2024-06-24

## 2024-06-25 ASSESSMENT — PATIENT HEALTH QUESTIONNAIRE - PHQ9
SUM OF ALL RESPONSES TO PHQ QUESTIONS 1-9: 0
1. LITTLE INTEREST OR PLEASURE IN DOING THINGS: NOT AT ALL
DEPRESSION UNABLE TO ASSESS: FUNCTIONAL CAPACITY MOTIVATION LIMITS ACCURACY
SUM OF ALL RESPONSES TO PHQ QUESTIONS 1-9: 0
SUM OF ALL RESPONSES TO PHQ9 QUESTIONS 1 & 2: 0
SUM OF ALL RESPONSES TO PHQ QUESTIONS 1-9: 0
2. FEELING DOWN, DEPRESSED OR HOPELESS: NOT AT ALL
SUM OF ALL RESPONSES TO PHQ QUESTIONS 1-9: 0

## 2024-06-25 NOTE — PROGRESS NOTES
Identified pt with two pt identifiers(name and ). Reviewed record in preparation for visit and have obtained necessary documentation.  Vitals:    24 0923   BP: 137/77   Site: Left Upper Arm   Position: Sitting   Cuff Size: Medium Adult   Pulse: 61   Resp: 18   Temp: 97.6 °F (36.4 °C)   TempSrc: Temporal   SpO2: 96%   Weight: 92.6 kg (204 lb 3.2 oz)   Height: 1.753 m (5' 9\")        Health Maintenance Review: Patient reminded of \"due or due soon\" health maintenance. I have asked the patient to contact his/her primary care provider (PCP) for follow-up on his/her health maintenance.    Coordination of Care Questionnaire:  :   1) Have you been to an emergency room, urgent care, or hospitalized since your last visit?  If yes, where when, and reason for visit? no       2. Have seen or consulted any other health care provider since your last visit?   If yes, where when, and reason for visit?  No      Patient is accompanied by self I have received verbal consent from Calderon Moncada III to discuss any/all medical information while they are present in the room.

## 2024-06-25 NOTE — PROGRESS NOTES
[facility]      Blayne THOMPSON MD Fina, FACP, FACG, FAASLD      RUBY Heath-CRUZ Britton, [facility]   Celia Tolentino, [facility]   Shama Danielosta, [facility]  Mando Hackett, [facility]   Laura Gregorio, [facility]   Anna Lawrenceon, [facility]   [address], [address]   [place]  [address] [phone]   FAX: [phone]  [facility]   [address], [address]   [place]  [address] [phone]   FAX: [phone]     Patient Care Team:  Micaela Gage MD as PCP - General  Micaela Gage MD [facility]  Pati Mitchell MD as Physician  Kirby Huang MD as Surgeon  Aaron Cleveland MD as Consulting Physician  Thony Mooney MD (Nephrology)  Janet Story PA (Hepatology)  Bethany Brandt ANP (Nurse Practitioner)    Patient Active Problem List   Diagnosis    Diverticulitis    Cardiomyopathy (HCC)    CKD (chronic kidney disease) stage 3, GFR 30-59 ml/min (HCC)    Essential hypertension, benign    Gout    Prediabetes    Renal insufficiency    Diabetic neuropathy (HCC)    Hyperlipidemia with target LDL less than 100    CARLTON (obstructive sleep apnea)    Mild cognitive impairment    Anxiety and depression    Type 2 diabetes mellitus with hyperglycemia, with long-term current use of insulin (HCC)     Calderon Moncada III is being seen at [facility] for management of Steatotic Liver Disease (SLD). The active problem list, all pertinent past medical history, medications, radiologic findings and laboratory findings related to the liver disorder were reviewed and discussed with the patient.      The patient is a 64 y.o. Black male who was found to have elevation in liver enzymes in disease in 2018.  He states that he was advised at some point that there

## 2024-06-27 LAB
A2 MACROGLOB SERPL-MCNC: 233 MG/DL (ref 110–276)
ALT SERPL-CCNC: 38 IU/L (ref 0–55)
APO A-I SERPL-MCNC: 127 MG/DL (ref 101–178)
AST SERPL W P-5'-P-CCNC: 33 IU/L (ref 0–40)
BILIRUB SERPL-MCNC: 1 MG/DL (ref 0–1.2)
CHOLEST SERPL-MCNC: 138 MG/DL (ref 100–199)
FIBROSIS SCORING:: ABNORMAL
FIBROSIS STAGE SERPL QL: ABNORMAL
GGT SERPL-CCNC: 32 IU/L (ref 0–65)
GLUCOSE SERPL-MCNC: 190 MG/DL (ref 70–99)
HAPTOGLOB SERPL-MCNC: 198 MG/DL (ref 32–363)
INTERPRETATION: ABNORMAL
LABORATORY COMMENT REPORT: ABNORMAL
LIVER FIBR SCORE SERPL CALC.FIBROSURE: 0.49 (ref 0–0.21)
Lab: ABNORMAL
NASH - STEATOSIS GRADE: ABNORMAL
NASH - STEATOSIS GRADING: ABNORMAL
NASH - STEATOSIS SCORE: 0.77 (ref 0–0.4)
NASH SCORING: ABNORMAL
NECROINFLAMMATORY ACT GRADE SERPL QL: ABNORMAL
NECROINFLAMMATORY ACT SCORE SERPL: 0.81 (ref 0–0.25)
SERVICE CMNT-IMP: ABNORMAL
TRIGL SERPL-MCNC: 218 MG/DL (ref 0–149)

## 2024-06-27 NOTE — RESULT ENCOUNTER NOTE
Pt notified stable liver function and mild elevation of enzymes. Continue to work on weight loss, HTN and glucose management. ACKERMAN fibrosure suggests some degree of fibrosis and steatosis, differing from Fibrosure. Explained that these are both estimates and we will continue with annual evaluation for monitoring.

## 2024-07-08 DIAGNOSIS — F41.9 ANXIETY DISORDER, UNSPECIFIED: ICD-10-CM

## 2024-07-09 NOTE — TELEPHONE ENCOUNTER
Last office visit 11/15/2023 vv with Bethany  Next office visit 8/6/2024 with Bethany  Last med refill [facility]

## 2024-07-11 RX ORDER — ESCITALOPRAM OXALATE 20 MG/1
20 TABLET ORAL DAILY
Qty: 90 TABLET | Refills: 3 | Status: SHIPPED | OUTPATIENT
Start: 2024-07-11 | End: 2025-07-06

## 2024-07-17 ENCOUNTER — OFFICE VISIT (OUTPATIENT)
Age: 64
End: 2024-07-17
Payer: COMMERCIAL

## 2024-07-17 DIAGNOSIS — F32.1 CURRENT MODERATE EPISODE OF MAJOR DEPRESSIVE DISORDER WITHOUT PRIOR EPISODE (HCC): ICD-10-CM

## 2024-07-17 DIAGNOSIS — R41.9 NEUROCOGNITIVE DISORDER: Primary | ICD-10-CM

## 2024-07-17 DIAGNOSIS — F41.1 GENERALIZED ANXIETY DISORDER: ICD-10-CM

## 2024-07-17 PROCEDURE — 96132 NRPSYC TST EVAL PHYS/QHP 1ST: CPT | Performed by: CLINICAL NEUROPSYCHOLOGIST

## 2024-07-17 NOTE — PROGRESS NOTES
Prior to seeing the patient for today's visit, I reviewed pertinent records, including the previously completed report, the records in Epic, and any updated visits from other providers since the patient's last visit.    I provided feedback services related to the previously completed report. Attendees included: Patient and Spouse. Education was provided regarding my diagnostic impressions, and we discussed treatment plan/options. Attendees were provided with the opportunity to ask questions, which were answered to the best of my ability.    We discussed, in detail, the following:  Reviewed findings from evaluation including test results, diagnosis, and suspected contributing factors  Discussed recommendations outlined in report  Answered questions to the best of my ability    The patient needs to:   Follow up with referring provider for ongoing management, Initiate psychotherapy using resources (See handout), Use practical strategies to compensate for cognitive weaknesses (See handout), and Emphasize modifiable risk and protective factors for cognitive functioning (e.g., exercise, diet, cognitive stimulation; see handout)    The patient had the following reactions to recommendations: Patient and wife reported understanding results and recommendations.  Patient's wife reported the patient has become more repetitive and shown evidence of greater memory loss.  She also stated the patient is more suspicious of her behavior but there is conflict in their marriage.  Patient behavior is very concerning for early stages of Alzheimer's dementia but as noted in the report, there are some very unusual features that might warrant further clinical evaluation.  There is scheduled to follow-up with Bethany in August.    ASSESSMENT:  Neurocognitive disorder  Major depressive disorder  Generalized anxiety disorder    TIME SPENT PROVIDING SERVICES:   31 minutes     BILLING:  96132 x 1 Units    *Code 15917 Neuropsychological testing

## 2024-08-06 ENCOUNTER — OFFICE VISIT (OUTPATIENT)
Age: 64
End: 2024-08-06
Payer: COMMERCIAL

## 2024-08-06 ENCOUNTER — CLINICAL DOCUMENTATION (OUTPATIENT)
Age: 64
End: 2024-08-06

## 2024-08-06 VITALS
HEART RATE: 69 BPM | WEIGHT: 203 LBS | SYSTOLIC BLOOD PRESSURE: 140 MMHG | DIASTOLIC BLOOD PRESSURE: 84 MMHG | OXYGEN SATURATION: 93 % | BODY MASS INDEX: 29.98 KG/M2

## 2024-08-06 DIAGNOSIS — R41.9 NEUROCOGNITIVE DISORDER: Primary | ICD-10-CM

## 2024-08-06 DIAGNOSIS — F02.80 DEMENTIA OF THE ALZHEIMER'S TYPE WITH LATE ONSET WITHOUT BEHAVIORAL DISTURBANCE (HCC): ICD-10-CM

## 2024-08-06 DIAGNOSIS — F41.9 ANXIETY AND DEPRESSION: ICD-10-CM

## 2024-08-06 DIAGNOSIS — F32.A ANXIETY AND DEPRESSION: ICD-10-CM

## 2024-08-06 DIAGNOSIS — F41.1 GENERALIZED ANXIETY DISORDER: ICD-10-CM

## 2024-08-06 DIAGNOSIS — G31.84 MILD COGNITIVE IMPAIRMENT: ICD-10-CM

## 2024-08-06 DIAGNOSIS — F32.1 CURRENT MODERATE EPISODE OF MAJOR DEPRESSIVE DISORDER WITHOUT PRIOR EPISODE (HCC): ICD-10-CM

## 2024-08-06 DIAGNOSIS — G30.1 DEMENTIA OF THE ALZHEIMER'S TYPE WITH LATE ONSET WITHOUT BEHAVIORAL DISTURBANCE (HCC): ICD-10-CM

## 2024-08-06 PROCEDURE — 3077F SYST BP >= 140 MM HG: CPT | Performed by: PSYCHIATRY & NEUROLOGY

## 2024-08-06 PROCEDURE — 3079F DIAST BP 80-89 MM HG: CPT | Performed by: PSYCHIATRY & NEUROLOGY

## 2024-08-06 PROCEDURE — 99215 OFFICE O/P EST HI 40 MIN: CPT | Performed by: PSYCHIATRY & NEUROLOGY

## 2024-08-06 RX ORDER — BUPROPION HYDROCHLORIDE 150 MG/1
150 TABLET ORAL EVERY MORNING
Qty: 90 TABLET | Refills: 0 | Status: SHIPPED | OUTPATIENT
Start: 2024-08-06

## 2024-08-06 ASSESSMENT — PATIENT HEALTH QUESTIONNAIRE - PHQ9
2. FEELING DOWN, DEPRESSED OR HOPELESS: SEVERAL DAYS
4. FEELING TIRED OR HAVING LITTLE ENERGY: SEVERAL DAYS
SUM OF ALL RESPONSES TO PHQ QUESTIONS 1-9: 7
SUM OF ALL RESPONSES TO PHQ9 QUESTIONS 1 & 2: 2
1. LITTLE INTEREST OR PLEASURE IN DOING THINGS: SEVERAL DAYS
8. MOVING OR SPEAKING SO SLOWLY THAT OTHER PEOPLE COULD HAVE NOTICED. OR THE OPPOSITE, BEING SO FIGETY OR RESTLESS THAT YOU HAVE BEEN MOVING AROUND A LOT MORE THAN USUAL: NOT AT ALL
SUM OF ALL RESPONSES TO PHQ QUESTIONS 1-9: 7
7. TROUBLE CONCENTRATING ON THINGS, SUCH AS READING THE NEWSPAPER OR WATCHING TELEVISION: SEVERAL DAYS
5. POOR APPETITE OR OVEREATING: SEVERAL DAYS
9. THOUGHTS THAT YOU WOULD BE BETTER OFF DEAD, OR OF HURTING YOURSELF: NOT AT ALL
3. TROUBLE FALLING OR STAYING ASLEEP: SEVERAL DAYS
6. FEELING BAD ABOUT YOURSELF - OR THAT YOU ARE A FAILURE OR HAVE LET YOURSELF OR YOUR FAMILY DOWN: SEVERAL DAYS

## 2024-08-06 ASSESSMENT — ANXIETY QUESTIONNAIRES
3. WORRYING TOO MUCH ABOUT DIFFERENT THINGS: SEVERAL DAYS
6. BECOMING EASILY ANNOYED OR IRRITABLE: SEVERAL DAYS
4. TROUBLE RELAXING: MORE THAN HALF THE DAYS
1. FEELING NERVOUS, ANXIOUS, OR ON EDGE: SEVERAL DAYS
5. BEING SO RESTLESS THAT IT IS HARD TO SIT STILL: SEVERAL DAYS
2. NOT BEING ABLE TO STOP OR CONTROL WORRYING: SEVERAL DAYS

## 2024-08-06 NOTE — ASSESSMENT & PLAN NOTE
Continues on Lexapro 20 mg/day but patient and wife does not feel this is adequately controlled.  I will add in Wellbutrin 150 mg once a day.  I would also like them to be under the care of mental health psychiatry for med management.  A list of the local area providers have been given to the patient and his family and they can call to get this set up

## 2024-08-06 NOTE — ASSESSMENT & PLAN NOTE
Repeat neuropsych testing again supports major neurocognitive disorder.  I have a component is consistent with affective disorder.  But there is also decline in other domains which would NOT be consistent with affective disorder with significant concerns for development of dementia   Amyloid PET scan has been ordered to get further clarification

## 2024-08-06 NOTE — PROGRESS NOTES
[facility]  [facility]  [address]  [place] [address]  Phone: [phone] fax: [phone]          Calderon Moncada III is a 64 y.o. male who presents today for the following:  Chief Complaint   Patient presents with    Follow-up     Memory follow up          ASSESSMENT AND PLAN    1. Neurocognitive disorder  Assessment & Plan:  Repeat neuropsych testing again supports major neurocognitive disorder.  I have a component is consistent with affective disorder.  But there is also decline in other domains which would NOT be consistent with affective disorder with significant concerns for development of dementia   Amyloid PET scan has been ordered to get further clarification      2. Dementia of the Alzheimer's type with late onset without behavioral disturbance (HCC)  -     PET BRAIN AMYLOID IMAGING; Future  3. Current moderate episode of major depressive disorder without prior episode (HCC)  -     buPROPion (WELLBUTRIN XL) 150 MG extended release tablet; Take 1 tablet by mouth every morning, Disp-90 tablet, R-0Normal  4. Generalized anxiety disorder  -     buPROPion (WELLBUTRIN XL) 150 MG extended release tablet; Take 1 tablet by mouth every morning, Disp-90 tablet, R-0Normal  5. Mild cognitive impairment  Assessment & Plan:  Neuropsych testing continues to support neurocognitive disorder some other domains are not consistent with affective issues further clarification needs to be addressed amyloid PET scan has been ordered.    6. Anxiety and depression  Assessment & Plan:  Continues on Lexapro 20 mg/day but patient and wife does not feel this is adequately controlled.  I will add in Wellbutrin 150 mg once a day.  I would also like them to be under the care of mental health psychiatry for med management.  A list of the local area providers have been given to the patient and his family and they can call to get this set up         Patient and/or family

## 2024-08-06 NOTE — PATIENT INSTRUCTIONS
As a reminder:   Please come to your appointment 15 minutes before your office appointment.  This way, you can get checked in at the  and checked in by the nursing staff so you have the full allotment of time with your provider for your visit.  Please bring an up-to-date and accurate list of all your medications.  Or bring all your active prescription bottles with you at the time of your office visit and this includes over-the-counter medications so we can make sure that your medication list is up-to-date.  If you are scheduled for a virtual visit, please be aware that the  will need to check you in and usually the day before to verify insurance and collect co-pays as appropriate.  Please be prepared for the second call which will be from the nurse to go over your medications and any other vital information.  This will probably be done 30 minutes prior to your visit.  The reason why we do this early is that you can get the full benefit of your appointment time with your provider.  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  However there were some findings on the repeat testing that her not strongly consistent with this.  And we need to look for other pathology.  To this extent I am going to order an amyloid PET scan to determine if we are dealing with early Alzheimer's disease.  Once we get that result back we will have a better understanding of what is occurring and can direct our therapy more appropriately.          .  I have started you on Wellbutrin 150 mg take 1 a day.  This is in addition to your Lexapro.  You should be able to take these together safely since they work by different

## 2024-08-06 NOTE — ASSESSMENT & PLAN NOTE
Neuropsych testing continues to support neurocognitive disorder some other domains are not consistent with affective issues further clarification needs to be addressed amyloid PET scan has been ordered.

## 2024-08-06 NOTE — PROGRESS NOTES
Faxed order for amyloid PET scan to central scheduling at [phone] and received fax confirmation. Provided the contact information for Hilary Moncada ([phone]) on the fax cover sheet to call to schedule this.

## 2024-08-12 ENCOUNTER — TELEPHONE (OUTPATIENT)
Age: 64
End: 2024-08-12

## 2024-08-12 NOTE — TELEPHONE ENCOUNTER
Attempted to leave a message but the mailbox is full.  The kath free style is for blood sugars.  We do not do anything with that.

## 2024-08-12 NOTE — TELEPHONE ENCOUNTER
Patient is calling about the sensors he receives. He states he normally gets bigger sensors, however this time he received the freestyle kath small sensors.     When he put its on, \"his phone says a certain amount of time, but when he goes back, it doesn't show that.\" His phone isn't seeing the new sensor. It's not making the connection. He put another sensor in to try it and it did the same thing    He is experiencing difficulty using the freestyle kath sensors. Please give him a call back at . Thank you.

## 2024-09-04 DIAGNOSIS — I10 ESSENTIAL (PRIMARY) HYPERTENSION: ICD-10-CM

## 2024-09-04 DIAGNOSIS — Z79.4 TYPE 2 DIABETES MELLITUS WITH HYPERGLYCEMIA, WITH LONG-TERM CURRENT USE OF INSULIN (HCC): ICD-10-CM

## 2024-09-04 DIAGNOSIS — E11.65 TYPE 2 DIABETES MELLITUS WITH HYPERGLYCEMIA, WITH LONG-TERM CURRENT USE OF INSULIN (HCC): ICD-10-CM

## 2024-09-04 DIAGNOSIS — E78.2 MIXED HYPERLIPIDEMIA: ICD-10-CM

## 2024-09-06 ENCOUNTER — HOSPITAL ENCOUNTER (OUTPATIENT)
Facility: HOSPITAL | Age: 64
Discharge: HOME | End: 2024-09-09

## 2024-09-06 DIAGNOSIS — G30.1 DEMENTIA OF THE ALZHEIMER'S TYPE WITH LATE ONSET WITHOUT BEHAVIORAL DISTURBANCE (HCC): ICD-10-CM

## 2024-09-06 DIAGNOSIS — F02.80 DEMENTIA OF THE ALZHEIMER'S TYPE WITH LATE ONSET WITHOUT BEHAVIORAL DISTURBANCE (HCC): ICD-10-CM

## 2024-09-06 PROCEDURE — 78814 PET IMAGE W/CT LMTD: CPT

## 2024-09-06 PROCEDURE — A9586 FLORBETAPIR F18: HCPCS | Performed by: PSYCHIATRY & NEUROLOGY

## 2024-09-06 PROCEDURE — 6360000002 HC RX W HCPCS: Performed by: PSYCHIATRY & NEUROLOGY

## 2024-09-06 RX ADMIN — FLORBETAPIR F 18 10 MILLICURIE: 51 INJECTION, SOLUTION INTRAVENOUS at 14:10

## 2024-09-20 LAB
ALBUMIN SERPL-MCNC: 4.5 G/DL (ref 3.9–4.9)
ALBUMIN/CREAT UR: 31 MG/G CREAT (ref 0–29)
ALP SERPL-CCNC: 110 IU/L (ref 44–121)
ALT SERPL-CCNC: 24 IU/L (ref 0–44)
AST SERPL-CCNC: 28 IU/L (ref 0–40)
BILIRUB SERPL-MCNC: 1.1 MG/DL (ref 0–1.2)
BUN SERPL-MCNC: 18 MG/DL (ref 8–27)
BUN/CREAT SERPL: 12 (ref 10–24)
CALCIUM SERPL-MCNC: 10 MG/DL (ref 8.6–10.2)
CHLORIDE SERPL-SCNC: 99 MMOL/L (ref 96–106)
CHOLEST SERPL-MCNC: 145 MG/DL (ref 100–199)
CO2 SERPL-SCNC: 26 MMOL/L (ref 20–29)
CREAT SERPL-MCNC: 1.54 MG/DL (ref 0.76–1.27)
CREAT UR-MCNC: 129.8 MG/DL
EGFRCR SERPLBLD CKD-EPI 2021: 50 ML/MIN/1.73
GLOBULIN SER CALC-MCNC: 2.8 G/DL (ref 1.5–4.5)
GLUCOSE SERPL-MCNC: 128 MG/DL (ref 70–99)
HBA1C MFR BLD: 8.7 % (ref 4.8–5.6)
HDLC SERPL-MCNC: 31 MG/DL
IMP & REVIEW OF LAB RESULTS: NORMAL
LDLC SERPL CALC-MCNC: 84 MG/DL (ref 0–99)
Lab: NORMAL
MICROALBUMIN UR-MCNC: 39.7 UG/ML
POTASSIUM SERPL-SCNC: 3.9 MMOL/L (ref 3.5–5.2)
PROT SERPL-MCNC: 7.3 G/DL (ref 6–8.5)
REPORT: NORMAL
REPORT: NORMAL
SODIUM SERPL-SCNC: 142 MMOL/L (ref 134–144)
TRIGL SERPL-MCNC: 171 MG/DL (ref 0–149)
VLDLC SERPL CALC-MCNC: 30 MG/DL (ref 5–40)

## 2024-09-26 ENCOUNTER — OFFICE VISIT (OUTPATIENT)
Age: 64
End: 2024-09-26
Payer: COMMERCIAL

## 2024-09-26 VITALS
WEIGHT: 202.8 LBS | BODY MASS INDEX: 30.04 KG/M2 | SYSTOLIC BLOOD PRESSURE: 135 MMHG | DIASTOLIC BLOOD PRESSURE: 72 MMHG | HEART RATE: 59 BPM | HEIGHT: 69 IN

## 2024-09-26 DIAGNOSIS — E11.65 TYPE 2 DIABETES MELLITUS WITH HYPERGLYCEMIA, WITH LONG-TERM CURRENT USE OF INSULIN (HCC): Primary | ICD-10-CM

## 2024-09-26 DIAGNOSIS — Z79.4 TYPE 2 DIABETES MELLITUS WITH HYPERGLYCEMIA, WITH LONG-TERM CURRENT USE OF INSULIN (HCC): Primary | ICD-10-CM

## 2024-09-26 DIAGNOSIS — I10 ESSENTIAL (PRIMARY) HYPERTENSION: ICD-10-CM

## 2024-09-26 DIAGNOSIS — E78.2 MIXED HYPERLIPIDEMIA: ICD-10-CM

## 2024-09-26 PROCEDURE — 3052F HG A1C>EQUAL 8.0%<EQUAL 9.0%: CPT | Performed by: INTERNAL MEDICINE

## 2024-09-26 PROCEDURE — 3078F DIAST BP <80 MM HG: CPT | Performed by: INTERNAL MEDICINE

## 2024-09-26 PROCEDURE — 95251 CONT GLUC MNTR ANALYSIS I&R: CPT | Performed by: INTERNAL MEDICINE

## 2024-09-26 PROCEDURE — 3075F SYST BP GE 130 - 139MM HG: CPT | Performed by: INTERNAL MEDICINE

## 2024-09-26 PROCEDURE — 99214 OFFICE O/P EST MOD 30 MIN: CPT | Performed by: INTERNAL MEDICINE

## 2024-11-03 DIAGNOSIS — F32.1 CURRENT MODERATE EPISODE OF MAJOR DEPRESSIVE DISORDER WITHOUT PRIOR EPISODE (HCC): ICD-10-CM

## 2024-11-03 DIAGNOSIS — F41.1 GENERALIZED ANXIETY DISORDER: ICD-10-CM

## 2024-11-04 RX ORDER — BUPROPION HYDROCHLORIDE 150 MG/1
150 TABLET ORAL EVERY MORNING
Qty: 90 TABLET | Refills: 0 | Status: SHIPPED | OUTPATIENT
Start: 2024-11-04

## 2024-11-18 RX ORDER — INSULIN GLARGINE 300 U/ML
INJECTION, SOLUTION SUBCUTANEOUS
Qty: 22.5 ML | Refills: 3 | Status: SHIPPED | OUTPATIENT
Start: 2024-11-18

## 2024-11-19 DIAGNOSIS — F41.1 GENERALIZED ANXIETY DISORDER: ICD-10-CM

## 2024-11-19 DIAGNOSIS — F32.1 CURRENT MODERATE EPISODE OF MAJOR DEPRESSIVE DISORDER WITHOUT PRIOR EPISODE (HCC): ICD-10-CM

## 2024-11-19 RX ORDER — BUPROPION HYDROCHLORIDE 150 MG/1
150 TABLET ORAL EVERY MORNING
Qty: 90 TABLET | Refills: 3 | Status: SHIPPED | OUTPATIENT
Start: 2024-11-19

## 2024-11-20 ENCOUNTER — OFFICE VISIT (OUTPATIENT)
Age: 64
End: 2024-11-20
Payer: COMMERCIAL

## 2024-11-20 VITALS
BODY MASS INDEX: 29.39 KG/M2 | HEART RATE: 74 BPM | WEIGHT: 199 LBS | SYSTOLIC BLOOD PRESSURE: 118 MMHG | DIASTOLIC BLOOD PRESSURE: 80 MMHG | TEMPERATURE: 97.5 F | OXYGEN SATURATION: 98 %

## 2024-11-20 DIAGNOSIS — G30.0 MILD EARLY ONSET ALZHEIMER'S DEMENTIA WITH MOOD DISTURBANCE (HCC): Primary | ICD-10-CM

## 2024-11-20 DIAGNOSIS — F02.A3 MILD EARLY ONSET ALZHEIMER'S DEMENTIA WITH MOOD DISTURBANCE (HCC): Primary | ICD-10-CM

## 2024-11-20 DIAGNOSIS — R41.9 NEUROCOGNITIVE DISORDER: ICD-10-CM

## 2024-11-20 PROCEDURE — 99215 OFFICE O/P EST HI 40 MIN: CPT | Performed by: PSYCHIATRY & NEUROLOGY

## 2024-11-20 PROCEDURE — 3074F SYST BP LT 130 MM HG: CPT | Performed by: PSYCHIATRY & NEUROLOGY

## 2024-11-20 PROCEDURE — 3079F DIAST BP 80-89 MM HG: CPT | Performed by: PSYCHIATRY & NEUROLOGY

## 2024-11-20 RX ORDER — MEMANTINE HYDROCHLORIDE 10 MG/1
TABLET ORAL
Qty: 180 TABLET | Refills: 3 | Status: SHIPPED | OUTPATIENT
Start: 2024-11-20

## 2024-11-20 ASSESSMENT — MINI MENTAL STATE EXAM
SAY: READ THE WORDS ON THE PAGE AND THEN DO WHAT IT SAYS. THEN HAND THE PERSON
THE SHEET WITH CLOSE YOUR EYES ON IT. IF THE SUBJECT READS AND DOES NOT CLOSE THEIR EYES, REPEAT UP TO THREE TIMES. SCORE ONLY IF SUBJECT CLOSES EYES.: 1
SAY: PUT THE PAPER DOWN ON THE FLOOR, SCORE IF PAPER IS PLACED BACK ON FLOOR: 1
WHAT FLOOR ARE WE ON [IN FACILITY]?/ WHAT ROOM ARE WE IN [IN HOME]?: 0
ASK THE PERSON IF HE IS RIGHT OR LEFT-HANDED. TAKE A PIECE OF PAPER AND HOLD IT UP IN
FRONT OF THE PERSON. SAY: TAKE THIS PAPER IN YOUR RIGHT/LEFT HAND (WHICHEVER IS NON-
DOMINANT), SCORE IF PAPER IS PICKED UP IN CORRECT HAND.: 1
NOW WHAT WERE THE THREE OBJECTS I ASKED YOU TO REMEMBER?: 0
SUM ALL MMSE QUESTIONS FOR TOTAL SCORE [OUT OF 30].: 22
WHAT YEAR IS THIS?: 0
HAND THE PERSON A PENCIL AND PAPER. SAY: WRITE ANY COMPLETE SENTENCE ON THAT
PIECE OF PAPER. (NOTE: THE SENTENCE MUST MAKE SENSE. IGNORE SPELLING ERRORS): 1
WHICH SEASON IS THIS?: 0
WHAT CITY/TOWN ARE WE IN?: 1
WHAT IS THE NAME OF THIS BUILDING [IN FACILITY]?/WHAT IS THE STREET ADDRESS OF THIS HOUSE [IN HOME]?: 1
SAY: I WOULD LIKE YOU TO REPEAT THIS PHRASE AFTER ME: NO IFS, ANDS, OR BUTS.: 1
SAY: I AM GOING TO NAME THREE OBJECTS. WHEN I AM FINISHED, I WANT YOU TO REPEAT
THEM. REMEMBER WHAT THEY ARE BECAUSE I AM GOING TO ASK YOU TO NAME THEM AGAIN IN
A FEW MINUTES.  SAY THE FOLLOWING WORDS SLOWLY AT 1-SECOND INTERVALS - BALL/ CAR/ MAN [ITERATIONS FOR REPEAT ADMINISTRATION]: 3
WHAT MONTH IS THIS?: 1
SAY: I WOULD LIKE YOU TO COUNT BACKWARD FROM 100 BY SEVENS: 5
SHOW: WRISTWATCH [OBJECT] ASK: WHAT IS THIS CALLED?: 1
WHAT COUNTRY ARE WE IN?: 1
PLACE DESIGN, ERASER AND PENCIL IN FRONT OF THE PERSON.  SAY:  COPY THIS DESIGN PLEASE.  SHOW: DESIGN. ALLOW: MULTIPLE TRIES. WAIT UNTIL PERSON IS FINISHED AND HANDS IT BACK. SCORE: ONLY FOR DIAGRAM WITH 4-SIDED FIGURE BETWEEN TWO 5-SIDED FIGURES: 1
SHOW: PENCIL [OBJECT] ASK: WHAT IS THIS CALLED?: 1
WHAT DAY OF THE WEEK IS THIS?: 0
WHAT STATE [OR PROVINCE] ARE WE IN?: 1
WHAT IS TODAY'S DATE?: 0
SAY: FOLD THE PAPER IN HALF ONCE WITH BOTH HANDS, SCORE IF PAPER IS CORRECTLY FOLDED IN HALF.: 1

## 2024-11-20 ASSESSMENT — PATIENT HEALTH QUESTIONNAIRE - PHQ9
SUM OF ALL RESPONSES TO PHQ QUESTIONS 1-9: 0
SUM OF ALL RESPONSES TO PHQ QUESTIONS 1-9: 0
SUM OF ALL RESPONSES TO PHQ9 QUESTIONS 1 & 2: 0
SUM OF ALL RESPONSES TO PHQ QUESTIONS 1-9: 0
1. LITTLE INTEREST OR PLEASURE IN DOING THINGS: NOT AT ALL
2. FEELING DOWN, DEPRESSED OR HOPELESS: NOT AT ALL
SUM OF ALL RESPONSES TO PHQ QUESTIONS 1-9: 0

## 2024-11-20 ASSESSMENT — ANXIETY QUESTIONNAIRES
1. FEELING NERVOUS, ANXIOUS, OR ON EDGE: NOT AT ALL
5. BEING SO RESTLESS THAT IT IS HARD TO SIT STILL: NOT AT ALL
7. FEELING AFRAID AS IF SOMETHING AWFUL MIGHT HAPPEN: NOT AT ALL
4. TROUBLE RELAXING: NOT AT ALL
GAD7 TOTAL SCORE: 0
6. BECOMING EASILY ANNOYED OR IRRITABLE: NOT AT ALL
3. WORRYING TOO MUCH ABOUT DIFFERENT THINGS: NOT AT ALL
2. NOT BEING ABLE TO STOP OR CONTROL WORRYING: NOT AT ALL

## 2024-11-20 NOTE — PATIENT INSTRUCTIONS
half a tablet twice daily for two weeks, then increase to one full tablet twice daily.  - Blood work ordered.  - Continue working on cars and driving in familiar areas, but avoid night driving.  - If considering infusion therapy, schedule a 15-minute virtual visit to discuss benefits and risks before initiating paperwork.    Follow-Up Instructions:  - Return in 6 months for follow-up.  - If deciding to proceed with infusion therapy, contact the doctor to set up a virtual visit and coordinate with the nurse navigator for paperwork and MRI scans.    Additional Notes:  - Discussed potential benefits and risks of oral medications and infusions for Alzheimer's dementia.  - Infusions are only FDA approved and covered by insurance if the clinical diagnosis is early or mild.  - Nurse navigator will assist with paperwork and coordinate infusions and MRI scans if proceeding with infusion therapy.                  Office Policies    Phone calls/patient messages:  Please allow up to 72 hours  for someone in the office to contact you about your call or message. Be mindful your provider may be out of the office or your message may require further review. We encourage you to use [employer] for your messages as this is a faster, more efficient way to communicate with our office    Medication Refills:  Prescription medications require up to 48 business hours to process. We encourage you to use [employer] for your refills.     For controlled medications: Please allow up to 72 business hours to process. Certain medications may require you to  a written prescription at our office.    NO narcotic/controlled medications will be prescribed after 4pm Monday through Friday or on weekends    Form/Paperwork Completion:  We ask that you allow 7-14 business days.  Forms can be downloaded to [employer] or you can have them faxed, mailed, or you can bring them in to our office directly.

## 2024-11-20 NOTE — ASSESSMENT & PLAN NOTE
Patient has a new diagnosis of Alzheimer's dementia 11/20/2024 based on amyloid PET scan and clinical history    Patient will be started on Namenda 10 mg titrating to 1 tablet twice daily    Discussed in moderate detail the newer infusible Mab therapies for dementia.  APO E screening has been ordered FALs was completed with a score of 6 MMSE was completed today with a score of 22/30    If he decides to move forward with the infusible therapy we will need to repeat a brain MRI scan as it has been over a year since his last brain MRI scan but other than that he should be eligible to begin infusion should that be the decision.    Information was given regarding both infusible therapies and I have asked his wife to contact our office if they want to move forward with this at which point we will set up a 15-minute virtual visit to again review the risks benefits as we understand them today and then if they are still in agreement we will get the paperwork started.  Otherwise we will see him back in 6 months

## 2024-11-20 NOTE — PROGRESS NOTES
Follow up on memory  Patient reports no changes   Patient verbalizes no questions or concerns    
Nerves:    Grossly intact    Motor:   To casual observation: Normal tone and bulk no tremor appreciated moves all extremities spontaneously with purpose        Sensation: Intact to light touch    Coordination/Cerebellar:   Grossly intact    Gait: Ambulates dependently    Reflexes: Not tested today    Fall risk assessment       No data to display                     The patient (or guardian, if applicable) and other individuals in attendance with the patient were advised that Artificial Intelligence will be utilized during this visit to record, process the conversation to generate a clinical note, and support improvement of the AI technology. The patient (or guardian, if applicable) and other individuals in attendance at the appointment consented to the use of AI, including the recording.              Bethany Brandt, MS, ANP-BC, MSCN

## 2024-11-25 LAB
APOE GENE MUT ANL BLD/T: NORMAL
CITATION REF LAB TEST: NORMAL
LABORATORY COMMENT REPORT: NORMAL
LIMITATIONS:: NORMAL
PROVIDER SIGNING NAME: NORMAL
REF LAB TEST METHOD: NORMAL
TEST PERFORMANCE INFO SPEC: NORMAL

## 2025-02-19 RX ORDER — PEN NEEDLE, DIABETIC 31 GX5/16"
NEEDLE, DISPOSABLE MISCELLANEOUS
Qty: 90 EACH | Refills: 3 | Status: SHIPPED | OUTPATIENT
Start: 2025-02-19

## 2025-03-12 DIAGNOSIS — Z79.4 TYPE 2 DIABETES MELLITUS WITH HYPERGLYCEMIA, WITH LONG-TERM CURRENT USE OF INSULIN (HCC): ICD-10-CM

## 2025-03-12 DIAGNOSIS — E11.65 TYPE 2 DIABETES MELLITUS WITH HYPERGLYCEMIA, WITH LONG-TERM CURRENT USE OF INSULIN (HCC): ICD-10-CM

## 2025-03-12 DIAGNOSIS — I10 ESSENTIAL (PRIMARY) HYPERTENSION: ICD-10-CM

## 2025-03-12 DIAGNOSIS — E78.2 MIXED HYPERLIPIDEMIA: ICD-10-CM

## 2025-03-19 NOTE — PROGRESS NOTES
BMI 29.24 kg/m²        General:   Alert, oriented, not in acute distress   Eyes:  Anicteric Sclerae; no obvious strabismus   Nose:  No obvious nasal septum deviation    Neck:   Midline trachea   Chest/Lungs:  Symmetrical lung expansion, clear lung fields on auscultation    CVS:  Normal rate, regular rhythm,  no JVD   Extremities:  No obvious rashes, no edema    Neuro:  No focal deficits; No obvious tremor    Psych:  Normal affect,  normal countenance     Patient's phone number [phone] (home)  was reviewed and confirmed for accuracy.  He gives permission for messages regarding results and appointments to be left at that number.    An electronic signature was used to authenticate this note.    -- ZANE Santos NP, [facility]  03/20/25

## 2025-03-20 ENCOUNTER — OFFICE VISIT (OUTPATIENT)
Age: 65
End: 2025-03-20
Payer: MEDICARE

## 2025-03-20 ENCOUNTER — CLINICAL DOCUMENTATION (OUTPATIENT)
Age: 65
End: 2025-03-20

## 2025-03-20 VITALS
BODY MASS INDEX: 29.33 KG/M2 | TEMPERATURE: 98 F | HEART RATE: 61 BPM | WEIGHT: 198 LBS | OXYGEN SATURATION: 95 % | SYSTOLIC BLOOD PRESSURE: 121 MMHG | DIASTOLIC BLOOD PRESSURE: 63 MMHG | HEIGHT: 69 IN

## 2025-03-20 DIAGNOSIS — G30.0 MILD EARLY ONSET ALZHEIMER'S DEMENTIA WITH MOOD DISTURBANCE (HCC): ICD-10-CM

## 2025-03-20 DIAGNOSIS — Z79.4 TYPE 2 DIABETES MELLITUS WITH HYPERGLYCEMIA, WITH LONG-TERM CURRENT USE OF INSULIN (HCC): ICD-10-CM

## 2025-03-20 DIAGNOSIS — E11.65 TYPE 2 DIABETES MELLITUS WITH HYPERGLYCEMIA, WITH LONG-TERM CURRENT USE OF INSULIN (HCC): ICD-10-CM

## 2025-03-20 DIAGNOSIS — I10 ESSENTIAL (PRIMARY) HYPERTENSION: ICD-10-CM

## 2025-03-20 DIAGNOSIS — G47.33 OBSTRUCTIVE SLEEP APNEA (ADULT) (PEDIATRIC): Primary | ICD-10-CM

## 2025-03-20 DIAGNOSIS — F02.A3 MILD EARLY ONSET ALZHEIMER'S DEMENTIA WITH MOOD DISTURBANCE (HCC): ICD-10-CM

## 2025-03-20 PROCEDURE — 3074F SYST BP LT 130 MM HG: CPT | Performed by: NURSE PRACTITIONER

## 2025-03-20 PROCEDURE — 99213 OFFICE O/P EST LOW 20 MIN: CPT | Performed by: NURSE PRACTITIONER

## 2025-03-20 PROCEDURE — 1123F ACP DISCUSS/DSCN MKR DOCD: CPT | Performed by: NURSE PRACTITIONER

## 2025-03-20 PROCEDURE — 3078F DIAST BP <80 MM HG: CPT | Performed by: NURSE PRACTITIONER

## 2025-03-20 ASSESSMENT — SLEEP AND FATIGUE QUESTIONNAIRES
HOW LIKELY ARE YOU TO NOD OFF OR FALL ASLEEP WHILE WATCHING TV: WOULD NEVER DOZE
HOW LIKELY ARE YOU TO NOD OFF OR FALL ASLEEP WHILE SITTING AND TALKING TO SOMEONE: WOULD NEVER DOZE
HOW LIKELY ARE YOU TO NOD OFF OR FALL ASLEEP WHILE SITTING INACTIVE IN A PUBLIC PLACE: WOULD NEVER DOZE
HOW LIKELY ARE YOU TO NOD OFF OR FALL ASLEEP WHEN YOU ARE A PASSENGER IN A CAR FOR AN HOUR WITHOUT A BREAK: WOULD NEVER DOZE
HOW LIKELY ARE YOU TO NOD OFF OR FALL ASLEEP WHILE SITTING QUIETLY AFTER LUNCH WITHOUT ALCOHOL: WOULD NEVER DOZE
ESS TOTAL SCORE: 1
HOW LIKELY ARE YOU TO NOD OFF OR FALL ASLEEP WHILE SITTING AND READING: WOULD NEVER DOZE
HOW LIKELY ARE YOU TO NOD OFF OR FALL ASLEEP IN A CAR, WHILE STOPPED FOR A FEW MINUTES IN TRAFFIC: WOULD NEVER DOZE
HOW LIKELY ARE YOU TO NOD OFF OR FALL ASLEEP WHILE LYING DOWN TO REST IN THE AFTERNOON WHEN CIRCUMSTANCES PERMIT: SLIGHT CHANCE OF DOZING

## 2025-03-20 NOTE — PATIENT INSTRUCTIONS
drowsiness. Medications that impair a drivers attention should have a warning label. Alcohol naturally makes you sleepy and on its own can cause accidents. Combined with excessive drowsiness its effects are amplified.   Signs of Drowsy Driving:   * You don't remember driving the last few miles   * You may drift out of your cortney   * You are unable to focus and your thoughts wander   * You may yawn more often than normal   * You have difficulty keeping your eyes open / nodding off   * Missing traffic signs, speeding, or tailgating  Prevention-   Good sleep hygiene, lifestyle and behavioral choices have the most impact on drowsy driving. There is no substitute for sleep and the average person requires 8 hours nightly. If you find yourself driving drowsy, stop and sleep. Consider the sleep hygiene tips provided during your visit as well.     Medication Refill Policy: Refills for all medications require 1 week advance notice. Please have your pharmacy fax a refill request. We are unable to fax, or call in \"controled substance\" medications and you will need to pick these prescriptions up from our office.

## 2025-03-22 LAB
ALBUMIN SERPL-MCNC: 4.4 G/DL (ref 3.9–4.9)
ALP SERPL-CCNC: 94 IU/L (ref 44–121)
ALT SERPL-CCNC: 28 IU/L (ref 0–44)
AST SERPL-CCNC: 35 IU/L (ref 0–40)
BILIRUB SERPL-MCNC: 1.4 MG/DL (ref 0–1.2)
BUN SERPL-MCNC: 27 MG/DL (ref 8–27)
BUN/CREAT SERPL: 15 (ref 10–24)
CALCIUM SERPL-MCNC: 10 MG/DL (ref 8.6–10.2)
CHLORIDE SERPL-SCNC: 103 MMOL/L (ref 96–106)
CHOLEST SERPL-MCNC: 146 MG/DL (ref 100–199)
CO2 SERPL-SCNC: 25 MMOL/L (ref 20–29)
CREAT SERPL-MCNC: 1.84 MG/DL (ref 0.76–1.27)
EGFRCR SERPLBLD CKD-EPI 2021: 40 ML/MIN/1.73
GLOBULIN SER CALC-MCNC: 2.9 G/DL (ref 1.5–4.5)
GLUCOSE SERPL-MCNC: 101 MG/DL (ref 70–99)
HBA1C MFR BLD: 7.7 % (ref 4.8–5.6)
HDLC SERPL-MCNC: 31 MG/DL
LDLC SERPL CALC-MCNC: 86 MG/DL (ref 0–99)
POTASSIUM SERPL-SCNC: 3.9 MMOL/L (ref 3.5–5.2)
PROT SERPL-MCNC: 7.3 G/DL (ref 6–8.5)
SODIUM SERPL-SCNC: 142 MMOL/L (ref 134–144)
TRIGL SERPL-MCNC: 168 MG/DL (ref 0–149)
VLDLC SERPL CALC-MCNC: 29 MG/DL (ref 5–40)

## 2025-03-23 LAB
ALBUMIN/CREAT UR: 4 MG/G CREAT (ref 0–29)
CREAT UR-MCNC: 176.8 MG/DL
IMP & REVIEW OF LAB RESULTS: NORMAL
Lab: NORMAL
MICROALBUMIN UR-MCNC: 7.3 UG/ML
REPORT: NORMAL
REPORT: NORMAL

## 2025-03-28 ENCOUNTER — RESULTS FOLLOW-UP (OUTPATIENT)
Age: 65
End: 2025-03-28

## 2025-03-28 ENCOUNTER — OFFICE VISIT (OUTPATIENT)
Age: 65
End: 2025-03-28
Payer: MEDICARE

## 2025-03-28 VITALS
HEIGHT: 69 IN | BODY MASS INDEX: 29.53 KG/M2 | WEIGHT: 199.4 LBS | DIASTOLIC BLOOD PRESSURE: 72 MMHG | SYSTOLIC BLOOD PRESSURE: 126 MMHG | HEART RATE: 63 BPM

## 2025-03-28 DIAGNOSIS — E78.2 MIXED HYPERLIPIDEMIA: ICD-10-CM

## 2025-03-28 DIAGNOSIS — I10 ESSENTIAL (PRIMARY) HYPERTENSION: ICD-10-CM

## 2025-03-28 DIAGNOSIS — Z79.4 TYPE 2 DIABETES MELLITUS WITH HYPERGLYCEMIA, WITH LONG-TERM CURRENT USE OF INSULIN (HCC): Primary | ICD-10-CM

## 2025-03-28 DIAGNOSIS — E11.65 TYPE 2 DIABETES MELLITUS WITH HYPERGLYCEMIA, WITH LONG-TERM CURRENT USE OF INSULIN (HCC): Primary | ICD-10-CM

## 2025-03-28 PROCEDURE — 1123F ACP DISCUSS/DSCN MKR DOCD: CPT | Performed by: INTERNAL MEDICINE

## 2025-03-28 PROCEDURE — 3078F DIAST BP <80 MM HG: CPT | Performed by: INTERNAL MEDICINE

## 2025-03-28 PROCEDURE — G2211 COMPLEX E/M VISIT ADD ON: HCPCS | Performed by: INTERNAL MEDICINE

## 2025-03-28 PROCEDURE — 95251 CONT GLUC MNTR ANALYSIS I&R: CPT | Performed by: INTERNAL MEDICINE

## 2025-03-28 PROCEDURE — 3051F HG A1C>EQUAL 7.0%<8.0%: CPT | Performed by: INTERNAL MEDICINE

## 2025-03-28 PROCEDURE — 99214 OFFICE O/P EST MOD 30 MIN: CPT | Performed by: INTERNAL MEDICINE

## 2025-03-28 PROCEDURE — 3074F SYST BP LT 130 MM HG: CPT | Performed by: INTERNAL MEDICINE

## 2025-03-28 NOTE — PROGRESS NOTES
normal ever since, last in 2/23, up to 81 in 3/24 so increased katina to 50 and down to 31 in 9/24 and 4 in 3/25  - check A1c and cmp prior to next visit          2. Essential hypertension, benign: his BP was at goal < 140/90 but creatinine has risen over the past 12 months after starting hctz so will stop this   - cont amlodipine 5 mg daily  - cont coreg 25 mg twice daily  - cont lisinopril 40 mg daily  - cont spironolactone 50 mg daily  - stop hctz 12.5 mg daily        3. Hyperlipidemia with target LDL less than 100:  in 8/17 and was just started on atorva 20 mg daily and down to 38 in 2/18.   and non- in 7/18 with A1c of 13%.  LDL 59 and  in 3/19.  Was off atorva in 8/19 and LDL 69,  so restarted and LDL 62 and  in 2/20.  LDL 71 and  in 8/20.  LDL 60 and  in 2/21.  LDL 71 and  in 8/21.  LDL 84 and  in 2/22.  LDL 67 and  in 9/22.   and  in 2/23.  LDL 65 and  in 9/23.  LDL 70 and  in 3/24.  LDL 84 and  in 9/24.  LDL 86 and  in 3/25  - cont atorva 20 mg daily  - check lipids prior to next visit          Patient Instructions   1) Stop the hydrochlorothiazide (hctz) 12.5 mg tab as this can possibly be making your creatinine (kidney test) too high.    2) Your Hemoglobin A1c (3 month test of blood sugar) was 7.7% down from 8.7% and we'll work to get his closer to 7% or less.  The biggest spike is after dinner and sometimes overnight based on what you ate for dinner or after dinner.  This was the lowest since 7.5% in 9/22.    Try not to snack on anything with sugar after dinner as this will cause your blood sugar to be higher the next morning.  You can try sugar free jello or pudding or raw veggies or nuts as these won't cause your sugar to spike overnight    3) Your blood pressure looks good and your ALT and AST are markers of liver function.  Your values are normal.    4) Your urine protein was normal and your

## 2025-03-28 NOTE — PATIENT INSTRUCTIONS
1) Stop the hydrochlorothiazide (hctz) 12.5 mg tab as this can possibly be making your creatinine (kidney test) too high.    2) Your Hemoglobin A1c (3 month test of blood sugar) was 7.7% down from 8.7% and we'll work to get his closer to 7% or less.  The biggest spike is after dinner and sometimes overnight based on what you ate for dinner or after dinner.    Try not to snack on anything with sugar after dinner as this will cause your blood sugar to be higher the next morning.  You can try sugar free jello or pudding or raw veggies or nuts as these won't cause your sugar to spike overnight    3) Your blood pressure looks good and your ALT and AST are markers of liver function.  Your values are normal.    4) Your urine protein was normal and your cholesterol is normal.      5) You can try a Super B-complex vitamin with B6 and B12 one tab daily to see if this helps with any of the nerve pain symptoms you have been having.

## 2025-04-21 RX ORDER — ATORVASTATIN CALCIUM 20 MG/1
20 TABLET, FILM COATED ORAL NIGHTLY
Qty: 90 TABLET | Refills: 3 | Status: SHIPPED | OUTPATIENT
Start: 2025-04-21

## 2025-05-10 DIAGNOSIS — F41.9 ANXIETY DISORDER, UNSPECIFIED: ICD-10-CM

## 2025-05-13 RX ORDER — ESCITALOPRAM OXALATE 20 MG/1
20 TABLET ORAL DAILY
Qty: 90 TABLET | Refills: 3 | Status: SHIPPED | OUTPATIENT
Start: 2025-05-13

## 2025-05-13 NOTE — TELEPHONE ENCOUNTER
Last office visit 11/20/2024  Next office visit 07/11/2025  Last med refill 07/11/2024    Changing pharmacies

## 2025-07-01 ENCOUNTER — OFFICE VISIT (OUTPATIENT)
Age: 65
End: 2025-07-01
Payer: MEDICARE

## 2025-07-01 VITALS
WEIGHT: 198 LBS | DIASTOLIC BLOOD PRESSURE: 83 MMHG | BODY MASS INDEX: 29.33 KG/M2 | TEMPERATURE: 97.7 F | SYSTOLIC BLOOD PRESSURE: 145 MMHG | HEART RATE: 69 BPM | OXYGEN SATURATION: 98 % | RESPIRATION RATE: 14 BRPM | HEIGHT: 69 IN

## 2025-07-01 DIAGNOSIS — R74.8 ABNORMAL LEVELS OF OTHER SERUM ENZYMES: Primary | ICD-10-CM

## 2025-07-01 PROCEDURE — 99214 OFFICE O/P EST MOD 30 MIN: CPT | Performed by: PHYSICIAN ASSISTANT

## 2025-07-01 PROCEDURE — 3079F DIAST BP 80-89 MM HG: CPT | Performed by: PHYSICIAN ASSISTANT

## 2025-07-01 PROCEDURE — 3077F SYST BP >= 140 MM HG: CPT | Performed by: PHYSICIAN ASSISTANT

## 2025-07-01 PROCEDURE — 91200 LIVER ELASTOGRAPHY: CPT | Performed by: PHYSICIAN ASSISTANT

## 2025-07-01 PROCEDURE — 1123F ACP DISCUSS/DSCN MKR DOCD: CPT | Performed by: PHYSICIAN ASSISTANT

## 2025-07-01 NOTE — PROGRESS NOTES
[facility]     Blayne Harden MD, FACP, YOKASTAG, FAASLD   MD Janet Ott PA-CRUZ Britton, [facility]   Celia Tolentino, [facility]-  Mando Hackett, FNP-C   Laura Valentiny, [facility]   [address], [address]   [place]  [address] [phone]   FAX: [phone]  [facility]   [address], [address]   [place]  [address] [phone]   FAX: [phone]     Patient Care Team:  Osmar Bonilla MD as PCP - General (Family Medicine)  Pati Mitchell MD as Physician  Kirby Huang MD as Surgeon  Aaron Cleveland MD as Consulting Physician  Thony Mooney MD (Nephrology)  Janet Story PA (Hepatology)  Bethany Brandt ANP (Nurse Practitioner)    Patient Active Problem List   Diagnosis    Diverticulitis    Cardiomyopathy (HCC)    CKD (chronic kidney disease) stage 3, GFR 30-59 ml/min (HCC)    Essential hypertension, benign    Gout    Prediabetes    Renal insufficiency    Diabetic neuropathy (HCC)    Hyperlipidemia with target LDL less than 100    CARLTON (obstructive sleep apnea)    Mild cognitive impairment    Anxiety and depression    Type 2 diabetes mellitus with hyperglycemia, with long-term current use of insulin (HCC)    Neurocognitive disorder    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 The active problem list, all pertinent past medical history, medications, radiologic findings and laboratory findings related to the liver disorder were reviewed and discussed with the patient.      The patient is a 65 y.o. Black male who was found to have elevation in liver enzymes in 2018.  He states that he was

## 2025-07-01 NOTE — PROGRESS NOTES
Chief Complaint   Patient presents with    Abnormal Test Results    Follow-up     Abnormal levels of other serum enzymes        BP (!) 145/83   Pulse 69   Temp 97.7 °F (36.5 °C) (Temporal)   Resp 14   Ht 1.753 m (5' 9\")   Wt 89.8 kg (198 lb)   SpO2 98%   BMI 29.24 kg/m²      1. Have you been to the ER, urgent care clinic since your last visit?  Hospitalized since your last visit?Yes Where: [facility] 6/24/2025 for rectal bleeding     2. Have you seen or consulted any other health care providers outside of the [facility] since your last visit?  Include any pap smears or colon screening. No

## 2025-07-17 ENCOUNTER — TELEPHONE (OUTPATIENT)
Age: 65
End: 2025-07-17

## 2025-07-18 ENCOUNTER — CLINICAL DOCUMENTATION (OUTPATIENT)
Age: 65
End: 2025-07-18

## 2025-07-18 NOTE — TELEPHONE ENCOUNTER
Order emailed to [facility] representative to expedite the order.  LVM to notify the patient that his VV has been rescheduled for 11/4/25, as he has not been set up on PAP.

## 2025-07-24 DIAGNOSIS — F41.9 ANXIETY DISORDER, UNSPECIFIED: ICD-10-CM

## 2025-07-25 RX ORDER — ESCITALOPRAM OXALATE 20 MG/1
20 TABLET ORAL DAILY
Qty: 90 TABLET | Refills: 3 | Status: SHIPPED | OUTPATIENT
Start: 2025-07-25

## (undated) DEVICE — INTENT OT USE PROVIDES A STERILE INTERFACE BETWEEN THE OPERATING ROOM SURGICAL LAMPS (NON-STERILE) AND THE SURGEON OR STAFF WORKING IN THE STERILE FIELD.: Brand: ASPEN® ALC PLUS LIGHT HANDLE COVER

## (undated) DEVICE — SOLUTION IV 500ML 0.9% SOD CHL PH 5 INJ USP VIAFLX PLAS

## (undated) DEVICE — INSTRUMENT TRACKER 9733533XOM ENT 1PK

## (undated) DEVICE — SOLUTION IRRIG 1000ML 0.9% SOD CHL USP POUR PLAS BTL

## (undated) DEVICE — REFLEX ULTRA 45 WITH INTEGRATED CABLE: Brand: COBLATION

## (undated) DEVICE — PATIENT TRACKER 9734887XOM NON-INVASIVE

## (undated) DEVICE — NEEDLE HYPO 27GA L1.25IN GRY POLYPR HUB S STL REG BVL STR

## (undated) DEVICE — 3M™ TEGADERM™ I.V. SECUREMENT DRESSING, 9519HP, 2-3/8 IN X 2-3/8 IN (6 CM X 6 CM), 100/CT 4 CT/CASE: Brand: 3M™ TEGADERM™

## (undated) DEVICE — SYRINGE MED 10ML TRNSLUC BRL PLUNG BLK MRK POLYPR CTRL

## (undated) DEVICE — NEEDLE SPNL 25GA L3.5IN BLU HUB S STL REG WALL FIT STYL W/

## (undated) DEVICE — TUBING 3318503 4PK IRRIGATION

## (undated) DEVICE — ENT-SMH: Brand: MEDLINE INDUSTRIES, INC.

## (undated) DEVICE — GARMENT,MEDLINE,DVT,INT,CALF,MED, GEN2: Brand: MEDLINE

## (undated) DEVICE — TUBING 1895522 5PK STRAIGHTSHOT TO XPS: Brand: STRAIGHTSHOT®

## (undated) DEVICE — TOWEL SURG W17XL27IN STD BLU COT NONFENESTRATED PREWASHED

## (undated) DEVICE — BLADE 1884080EM TRICUT 4MMX13CM M4 ROHS: Brand: FUSION®

## (undated) DEVICE — FIRM 4CM: Brand: NASOPORE

## (undated) DEVICE — BASIN ST MAJOR-NO CAUTERY: Brand: MEDLINE INDUSTRIES, INC.